# Patient Record
Sex: FEMALE | Race: BLACK OR AFRICAN AMERICAN | Employment: UNEMPLOYED | URBAN - METROPOLITAN AREA
[De-identification: names, ages, dates, MRNs, and addresses within clinical notes are randomized per-mention and may not be internally consistent; named-entity substitution may affect disease eponyms.]

---

## 2017-02-08 ENCOUNTER — HOSPITAL ENCOUNTER (OUTPATIENT)
Age: 27
Setting detail: SPECIMEN
Discharge: HOME | End: 2017-02-08
Payer: MEDICARE

## 2017-02-08 ENCOUNTER — OFFICE VISIT (OUTPATIENT)
Dept: INTERNAL MEDICINE | Facility: CLINIC | Age: 27
End: 2017-02-08

## 2017-02-08 VITALS
HEIGHT: 64 IN | DIASTOLIC BLOOD PRESSURE: 72 MMHG | WEIGHT: 100 LBS | HEART RATE: 104 BPM | SYSTOLIC BLOOD PRESSURE: 117 MMHG | BODY MASS INDEX: 17.07 KG/M2 | OXYGEN SATURATION: 96 %

## 2017-02-08 DIAGNOSIS — J45.20 MILD INTERMITTENT ASTHMA WITHOUT COMPLICATION: ICD-10-CM

## 2017-02-08 DIAGNOSIS — F17.200 SMOKING: ICD-10-CM

## 2017-02-08 DIAGNOSIS — Z32.01 POSITIVE URINE PREGNANCY TEST: ICD-10-CM

## 2017-02-08 DIAGNOSIS — Z11.3 SCREEN FOR STD (SEXUALLY TRANSMITTED DISEASE): ICD-10-CM

## 2017-02-08 DIAGNOSIS — R63.4 WEIGHT LOSS: ICD-10-CM

## 2017-02-08 DIAGNOSIS — J45.41 MODERATE PERSISTENT ASTHMA WITH ACUTE EXACERBATION: Primary | ICD-10-CM

## 2017-02-08 LAB
ABSOLUTE EOS #: 0.2 K/UL (ref 0–0.4)
ABSOLUTE LYMPH #: 2.8 K/UL (ref 1–4.8)
ABSOLUTE MONO #: 0.7 K/UL (ref 0.1–1.2)
ALBUMIN SERPL-MCNC: 4.4 G/DL (ref 3.5–5.2)
ALBUMIN/GLOBULIN RATIO: 1.3 (ref 1–2.5)
ALP BLD-CCNC: 47 U/L (ref 35–104)
ALT SERPL-CCNC: 16 U/L (ref 5–33)
ANION GAP SERPL CALCULATED.3IONS-SCNC: 16 MMOL/L (ref 9–17)
AST SERPL-CCNC: 19 U/L
BASOPHILS # BLD: 1 % (ref 0–2)
BASOPHILS ABSOLUTE: 0 K/UL (ref 0–0.2)
BILIRUB SERPL-MCNC: 0.34 MG/DL (ref 0.3–1.2)
BUN BLDV-MCNC: 11 MG/DL (ref 6–20)
BUN/CREAT BLD: ABNORMAL (ref 9–20)
CALCIUM SERPL-MCNC: 9.1 MG/DL (ref 8.6–10.4)
CHLORIDE BLD-SCNC: 100 MMOL/L (ref 98–107)
CO2: 19 MMOL/L (ref 20–31)
CONTROL: ABNORMAL
CREAT SERPL-MCNC: 0.6 MG/DL (ref 0.5–0.9)
DIFFERENTIAL TYPE: NORMAL
EOSINOPHILS RELATIVE PERCENT: 2 % (ref 1–4)
GFR AFRICAN AMERICAN: >60 ML/MIN
GFR NON-AFRICAN AMERICAN: >60 ML/MIN
GFR SERPL CREATININE-BSD FRML MDRD: ABNORMAL ML/MIN/{1.73_M2}
GFR SERPL CREATININE-BSD FRML MDRD: ABNORMAL ML/MIN/{1.73_M2}
GLUCOSE BLD-MCNC: 82 MG/DL (ref 70–99)
HCG QUALITATIVE: POSITIVE
HCT VFR BLD CALC: 40.9 % (ref 36–46)
HEMOGLOBIN: 14.1 G/DL (ref 12–16)
HEPATITIS B SURFACE ANTIGEN: NONREACTIVE
HEPATITIS C ANTIBODY: NONREACTIVE
HIV AG/AB: NONREACTIVE
LYMPHOCYTES # BLD: 35 % (ref 24–44)
MCH RBC QN AUTO: 29.6 PG (ref 26–34)
MCHC RBC AUTO-ENTMCNC: 34.5 G/DL (ref 31–37)
MCV RBC AUTO: 85.7 FL (ref 80–100)
MONOCYTES # BLD: 9 % (ref 2–11)
PDW BLD-RTO: 13.5 % (ref 12.5–15.4)
PLATELET # BLD: 210 K/UL (ref 140–450)
PLATELET ESTIMATE: NORMAL
PMV BLD AUTO: 9.2 FL (ref 6–12)
POTASSIUM SERPL-SCNC: 3.5 MMOL/L (ref 3.7–5.3)
PREGNANCY TEST URINE, POC: POSITIVE
RBC # BLD: 4.77 M/UL (ref 4–5.2)
RBC # BLD: NORMAL 10*6/UL
SEG NEUTROPHILS: 53 % (ref 36–66)
SEGMENTED NEUTROPHILS ABSOLUTE COUNT: 4.3 K/UL (ref 1.8–7.7)
SODIUM BLD-SCNC: 135 MMOL/L (ref 135–144)
T. PALLIDUM, IGG: NONREACTIVE
TOTAL PROTEIN: 7.7 G/DL (ref 6.4–8.3)
TSH SERPL DL<=0.05 MIU/L-ACNC: 1.18 MIU/L (ref 0.3–5)
WBC # BLD: 8 K/UL (ref 3.5–11)
WBC # BLD: NORMAL 10*3/UL

## 2017-02-08 PROCEDURE — 80053 COMPREHEN METABOLIC PANEL: CPT

## 2017-02-08 PROCEDURE — 84443 ASSAY THYROID STIM HORMONE: CPT

## 2017-02-08 PROCEDURE — 86780 TREPONEMA PALLIDUM: CPT

## 2017-02-08 PROCEDURE — 81025 URINE PREGNANCY TEST: CPT | Performed by: INTERNAL MEDICINE

## 2017-02-08 PROCEDURE — 99213 OFFICE O/P EST LOW 20 MIN: CPT | Performed by: INTERNAL MEDICINE

## 2017-02-08 PROCEDURE — 86803 HEPATITIS C AB TEST: CPT

## 2017-02-08 PROCEDURE — 84703 CHORIONIC GONADOTROPIN ASSAY: CPT

## 2017-02-08 PROCEDURE — 85025 COMPLETE CBC W/AUTO DIFF WBC: CPT

## 2017-02-08 PROCEDURE — 87340 HEPATITIS B SURFACE AG IA: CPT

## 2017-02-08 PROCEDURE — 36415 COLL VENOUS BLD VENIPUNCTURE: CPT

## 2017-02-08 PROCEDURE — 87389 HIV-1 AG W/HIV-1&-2 AB AG IA: CPT

## 2017-02-08 RX ORDER — NICOTINE 21 MG/24HR
1 PATCH, TRANSDERMAL 24 HOURS TRANSDERMAL EVERY 24 HOURS
Qty: 30 PATCH | Refills: 3 | Status: SHIPPED | OUTPATIENT
Start: 2017-02-08 | End: 2017-08-24

## 2017-02-08 RX ORDER — BUDESONIDE 0.25 MG/2ML
250 INHALANT ORAL 2 TIMES DAILY
Qty: 60 AMPULE | Refills: 3 | Status: ON HOLD | OUTPATIENT
Start: 2017-02-08 | End: 2017-08-21 | Stop reason: HOSPADM

## 2017-02-08 RX ORDER — PNV NO.95/FERROUS FUM/FOLIC AC 28MG-0.8MG
1 TABLET ORAL DAILY
Qty: 90 TABLET | Refills: 1 | Status: SHIPPED | OUTPATIENT
Start: 2017-02-08 | End: 2018-08-31 | Stop reason: SDUPTHER

## 2017-02-08 RX ORDER — ALBUTEROL SULFATE 2.5 MG/3ML
2.5 SOLUTION RESPIRATORY (INHALATION) EVERY 6 HOURS PRN
Qty: 120 EACH | Refills: 3 | Status: SHIPPED | OUTPATIENT
Start: 2017-02-08 | End: 2018-03-01 | Stop reason: SDUPTHER

## 2017-02-10 DIAGNOSIS — Z34.90 PREGNANCY, UNSPECIFIED GESTATIONAL AGE: Primary | ICD-10-CM

## 2017-02-20 ENCOUNTER — TELEPHONE (OUTPATIENT)
Dept: INTERNAL MEDICINE | Facility: CLINIC | Age: 27
End: 2017-02-20

## 2017-02-20 DIAGNOSIS — Z34.90 EARLY STAGE OF PREGNANCY: Primary | ICD-10-CM

## 2017-02-27 ENCOUNTER — TELEPHONE (OUTPATIENT)
Dept: INTERNAL MEDICINE | Facility: CLINIC | Age: 27
End: 2017-02-27

## 2017-03-09 ENCOUNTER — TELEPHONE (OUTPATIENT)
Dept: INTERNAL MEDICINE | Facility: CLINIC | Age: 27
End: 2017-03-09

## 2017-03-13 ENCOUNTER — TELEPHONE (OUTPATIENT)
Dept: OBGYN | Age: 27
End: 2017-03-13

## 2017-03-28 ENCOUNTER — INITIAL PRENATAL (OUTPATIENT)
Dept: OBGYN | Age: 27
End: 2017-03-28
Payer: MEDICARE

## 2017-03-28 VITALS
WEIGHT: 105 LBS | SYSTOLIC BLOOD PRESSURE: 110 MMHG | DIASTOLIC BLOOD PRESSURE: 60 MMHG | BODY MASS INDEX: 18.02 KG/M2 | HEART RATE: 70 BPM

## 2017-03-28 DIAGNOSIS — Z72.0 TOBACCO USE: ICD-10-CM

## 2017-03-28 DIAGNOSIS — O09.91 HIGH-RISK PREGNANCY IN FIRST TRIMESTER: Primary | ICD-10-CM

## 2017-03-28 DIAGNOSIS — F12.90 MARIJUANA SMOKER: ICD-10-CM

## 2017-03-28 DIAGNOSIS — J45.41 MODERATE PERSISTENT ASTHMA WITH ACUTE EXACERBATION: ICD-10-CM

## 2017-03-28 PROBLEM — Z32.01 POSITIVE URINE PREGNANCY TEST: Status: RESOLVED | Noted: 2017-02-08 | Resolved: 2017-03-28

## 2017-03-28 PROBLEM — J45.909 ASTHMA: Status: ACTIVE | Noted: 2017-03-28

## 2017-03-28 PROCEDURE — 99211 OFF/OP EST MAY X REQ PHY/QHP: CPT | Performed by: OBSTETRICS & GYNECOLOGY

## 2017-03-28 PROCEDURE — H1000 PRENATAL CARE ATRISK ASSESSM: HCPCS | Performed by: OBSTETRICS & GYNECOLOGY

## 2017-03-28 RX ORDER — ALBUTEROL SULFATE 90 UG/1
2 AEROSOL, METERED RESPIRATORY (INHALATION) EVERY 6 HOURS PRN
COMMUNITY
End: 2017-05-02 | Stop reason: SDUPTHER

## 2017-03-31 DIAGNOSIS — O09.91 HIGH-RISK PREGNANCY IN FIRST TRIMESTER: Primary | ICD-10-CM

## 2017-04-06 ENCOUNTER — TELEPHONE (OUTPATIENT)
Dept: OBGYN | Age: 27
End: 2017-04-06

## 2017-04-06 ENCOUNTER — INITIAL PRENATAL (OUTPATIENT)
Dept: OBGYN | Age: 27
End: 2017-04-06
Payer: MEDICARE

## 2017-04-06 VITALS
HEART RATE: 68 BPM | DIASTOLIC BLOOD PRESSURE: 66 MMHG | BODY MASS INDEX: 18.02 KG/M2 | WEIGHT: 105 LBS | SYSTOLIC BLOOD PRESSURE: 107 MMHG

## 2017-04-06 DIAGNOSIS — Z3A.15 15 WEEKS GESTATION OF PREGNANCY: ICD-10-CM

## 2017-04-06 DIAGNOSIS — O20.9 VAGINAL BLEEDING IN PREGNANCY, FIRST TRIMESTER: ICD-10-CM

## 2017-04-06 DIAGNOSIS — O09.91 HRP (HIGH RISK PREGNANCY), FIRST TRIMESTER: Primary | ICD-10-CM

## 2017-04-06 DIAGNOSIS — Z78.9 DATE OF LAST MENSTRUAL PERIOD (LMP) UNKNOWN: ICD-10-CM

## 2017-04-06 PROBLEM — O46.90 VAGINAL BLEEDING IN PREGNANCY: Status: ACTIVE | Noted: 2017-04-06

## 2017-04-06 PROCEDURE — 99213 OFFICE O/P EST LOW 20 MIN: CPT | Performed by: OBSTETRICS & GYNECOLOGY

## 2017-04-07 ENCOUNTER — APPOINTMENT (OUTPATIENT)
Dept: ULTRASOUND IMAGING | Age: 27
End: 2017-04-07
Payer: MEDICARE

## 2017-04-07 ENCOUNTER — HOSPITAL ENCOUNTER (EMERGENCY)
Age: 27
Discharge: HOME | End: 2017-04-07
Attending: EMERGENCY MEDICINE
Payer: MEDICARE

## 2017-04-07 VITALS
DIASTOLIC BLOOD PRESSURE: 71 MMHG | SYSTOLIC BLOOD PRESSURE: 103 MMHG | WEIGHT: 105 LBS | HEART RATE: 78 BPM | OXYGEN SATURATION: 100 % | RESPIRATION RATE: 15 BRPM | TEMPERATURE: 99.1 F | HEIGHT: 64 IN | BODY MASS INDEX: 17.93 KG/M2

## 2017-04-07 DIAGNOSIS — O20.0 THREATENED ABORTION IN FIRST TRIMESTER: Primary | ICD-10-CM

## 2017-04-07 LAB
-: NORMAL
ABO/RH: NORMAL
ABSOLUTE EOS #: 0.3 K/UL (ref 0–0.4)
ABSOLUTE LYMPH #: 2.6 K/UL (ref 1–4.8)
ABSOLUTE MONO #: 0.6 K/UL (ref 0.1–1.2)
AMORPHOUS: NORMAL
BACTERIA: NORMAL
BASOPHILS # BLD: 1 % (ref 0–2)
BASOPHILS ABSOLUTE: 0.1 K/UL (ref 0–0.2)
BILIRUBIN URINE: NEGATIVE
CASTS UA: NORMAL /LPF (ref 0–8)
COLOR: YELLOW
COMMENT UA: ABNORMAL
CRYSTALS, UA: NORMAL /HPF
DIFFERENTIAL TYPE: ABNORMAL
DIRECT EXAM: NORMAL
EOSINOPHILS RELATIVE PERCENT: 4 % (ref 1–4)
EPITHELIAL CELLS UA: NORMAL /HPF (ref 0–5)
GLUCOSE URINE: NEGATIVE
HCG QUANTITATIVE: ABNORMAL IU/L
HCT VFR BLD CALC: 34.3 % (ref 36–46)
HEMOGLOBIN: 11.9 G/DL (ref 12–16)
KETONES, URINE: NEGATIVE
LEUKOCYTE ESTERASE, URINE: NEGATIVE
LYMPHOCYTES # BLD: 35 % (ref 24–44)
Lab: NORMAL
MCH RBC QN AUTO: 30.3 PG (ref 26–34)
MCHC RBC AUTO-ENTMCNC: 34.6 G/DL (ref 31–37)
MCV RBC AUTO: 87.6 FL (ref 80–100)
MONOCYTES # BLD: 7 % (ref 2–11)
MUCUS: NORMAL
NITRITE, URINE: NEGATIVE
OTHER OBSERVATIONS UA: NORMAL
PDW BLD-RTO: 14 % (ref 12.5–15.4)
PH UA: 6.5 (ref 5–8)
PLATELET # BLD: 171 K/UL (ref 140–450)
PLATELET ESTIMATE: ABNORMAL
PMV BLD AUTO: 8 FL (ref 6–12)
PROTEIN UA: NEGATIVE
RBC # BLD: 3.91 M/UL (ref 4–5.2)
RBC # BLD: ABNORMAL 10*6/UL
RBC UA: NORMAL /HPF (ref 0–4)
RENAL EPITHELIAL, UA: NORMAL /HPF
SEG NEUTROPHILS: 53 % (ref 36–66)
SEGMENTED NEUTROPHILS ABSOLUTE COUNT: 4.1 K/UL (ref 1.8–7.7)
SPECIFIC GRAVITY UA: 1.02 (ref 1–1.03)
SPECIMEN DESCRIPTION: NORMAL
STATUS: NORMAL
TRICHOMONAS: NORMAL
TURBIDITY: ABNORMAL
URINE HGB: ABNORMAL
UROBILINOGEN, URINE: NORMAL
WBC # BLD: 7.6 K/UL (ref 3.5–11)
WBC # BLD: ABNORMAL 10*3/UL
WBC UA: NORMAL /HPF (ref 0–5)
YEAST: NORMAL

## 2017-04-07 PROCEDURE — 87480 CANDIDA DNA DIR PROBE: CPT

## 2017-04-07 PROCEDURE — 86901 BLOOD TYPING SEROLOGIC RH(D): CPT

## 2017-04-07 PROCEDURE — 81001 URINALYSIS AUTO W/SCOPE: CPT

## 2017-04-07 PROCEDURE — 87660 TRICHOMONAS VAGIN DIR PROBE: CPT

## 2017-04-07 PROCEDURE — 76817 TRANSVAGINAL US OBSTETRIC: CPT

## 2017-04-07 PROCEDURE — 84702 CHORIONIC GONADOTROPIN TEST: CPT

## 2017-04-07 PROCEDURE — 99284 EMERGENCY DEPT VISIT MOD MDM: CPT

## 2017-04-07 PROCEDURE — 86900 BLOOD TYPING SEROLOGIC ABO: CPT

## 2017-04-07 PROCEDURE — 87591 N.GONORRHOEAE DNA AMP PROB: CPT

## 2017-04-07 PROCEDURE — 87491 CHLMYD TRACH DNA AMP PROBE: CPT

## 2017-04-07 PROCEDURE — 85025 COMPLETE CBC W/AUTO DIFF WBC: CPT

## 2017-04-07 PROCEDURE — 87510 GARDNER VAG DNA DIR PROBE: CPT

## 2017-04-07 ASSESSMENT — ENCOUNTER SYMPTOMS
RHINORRHEA: 0
BACK PAIN: 0
ABDOMINAL DISTENTION: 0
WHEEZING: 0
SHORTNESS OF BREATH: 0
NAUSEA: 0
PHOTOPHOBIA: 0
VOMITING: 0
ABDOMINAL PAIN: 0
BLOOD IN STOOL: 0
COUGH: 0
SORE THROAT: 0

## 2017-04-10 LAB
C TRACH DNA GENITAL QL NAA+PROBE: NEGATIVE
N. GONORRHOEAE DNA: NEGATIVE

## 2017-04-11 ENCOUNTER — TELEPHONE (OUTPATIENT)
Dept: OBGYN | Age: 27
End: 2017-04-11

## 2017-05-02 RX ORDER — ALBUTEROL SULFATE 90 UG/1
2 AEROSOL, METERED RESPIRATORY (INHALATION) EVERY 6 HOURS PRN
Qty: 1 INHALER | Refills: 0 | Status: SHIPPED | OUTPATIENT
Start: 2017-05-02 | End: 2017-06-17 | Stop reason: SDUPTHER

## 2017-05-11 ENCOUNTER — HOSPITAL ENCOUNTER (EMERGENCY)
Age: 27
Discharge: HOME | End: 2017-05-11
Attending: EMERGENCY MEDICINE
Payer: MEDICARE

## 2017-05-11 ENCOUNTER — INITIAL PRENATAL (OUTPATIENT)
Dept: OBGYN | Age: 27
End: 2017-05-11
Payer: MEDICARE

## 2017-05-11 VITALS
SYSTOLIC BLOOD PRESSURE: 102 MMHG | HEART RATE: 67 BPM | TEMPERATURE: 97 F | RESPIRATION RATE: 14 BRPM | OXYGEN SATURATION: 100 % | WEIGHT: 110 LBS | DIASTOLIC BLOOD PRESSURE: 71 MMHG | HEIGHT: 64 IN | BODY MASS INDEX: 18.78 KG/M2

## 2017-05-11 VITALS
DIASTOLIC BLOOD PRESSURE: 70 MMHG | SYSTOLIC BLOOD PRESSURE: 109 MMHG | WEIGHT: 107 LBS | BODY MASS INDEX: 18.37 KG/M2 | HEART RATE: 83 BPM

## 2017-05-11 DIAGNOSIS — R06.89 DYSPNEA AND RESPIRATORY ABNORMALITIES: ICD-10-CM

## 2017-05-11 DIAGNOSIS — Z3A.17 17 WEEKS GESTATION OF PREGNANCY: ICD-10-CM

## 2017-05-11 DIAGNOSIS — R06.00 DYSPNEA AND RESPIRATORY ABNORMALITIES: ICD-10-CM

## 2017-05-11 DIAGNOSIS — O09.92 HRP (HIGH RISK PREGNANCY), SECOND TRIMESTER: Primary | ICD-10-CM

## 2017-05-11 DIAGNOSIS — Z76.0 ENCOUNTER FOR MEDICATION REFILL: Primary | ICD-10-CM

## 2017-05-11 PROCEDURE — 99214 OFFICE O/P EST MOD 30 MIN: CPT | Performed by: OBSTETRICS & GYNECOLOGY

## 2017-05-11 PROCEDURE — 99284 EMERGENCY DEPT VISIT MOD MDM: CPT

## 2017-05-11 PROCEDURE — 6370000000 HC RX 637 (ALT 250 FOR IP): Performed by: EMERGENCY MEDICINE

## 2017-05-11 PROCEDURE — 94640 AIRWAY INHALATION TREATMENT: CPT

## 2017-05-11 RX ORDER — ALBUTEROL SULFATE 90 UG/1
2 AEROSOL, METERED RESPIRATORY (INHALATION) EVERY 6 HOURS PRN
Status: DISCONTINUED | OUTPATIENT
Start: 2017-05-11 | End: 2017-05-11 | Stop reason: HOSPADM

## 2017-05-11 RX ADMIN — ALBUTEROL SULFATE 2 PUFF: 90 AEROSOL, METERED RESPIRATORY (INHALATION) at 08:02

## 2017-05-11 ASSESSMENT — ENCOUNTER SYMPTOMS
SHORTNESS OF BREATH: 1
GASTROINTESTINAL NEGATIVE: 1
BACK PAIN: 0
CONSTIPATION: 0
ALLERGIC/IMMUNOLOGIC NEGATIVE: 1
CHEST TIGHTNESS: 0
EYES NEGATIVE: 1
TROUBLE SWALLOWING: 0
ABDOMINAL PAIN: 0
NAUSEA: 0
DIARRHEA: 0
WHEEZING: 0
SORE THROAT: 0
VOMITING: 0
COUGH: 0

## 2017-05-16 DIAGNOSIS — Z72.0 TOBACCO USE: ICD-10-CM

## 2017-05-16 RX ORDER — DM/PSEUDOEPHED/ACETAMINOPHEN 30-60-650
SOLUTION, ORAL ORAL
Qty: 110 EACH | Refills: 2 | Status: SHIPPED | OUTPATIENT
Start: 2017-05-16 | End: 2017-08-24

## 2017-06-01 ENCOUNTER — ROUTINE PRENATAL (OUTPATIENT)
Dept: PERINATAL CARE | Age: 27
End: 2017-06-01
Payer: MEDICARE

## 2017-06-01 VITALS
TEMPERATURE: 98.5 F | BODY MASS INDEX: 18.61 KG/M2 | SYSTOLIC BLOOD PRESSURE: 95 MMHG | HEIGHT: 64 IN | RESPIRATION RATE: 16 BRPM | DIASTOLIC BLOOD PRESSURE: 63 MMHG | HEART RATE: 103 BPM | WEIGHT: 109 LBS

## 2017-06-01 DIAGNOSIS — Z3A.20 20 WEEKS GESTATION OF PREGNANCY: ICD-10-CM

## 2017-06-01 DIAGNOSIS — O35.BXX0 FETAL CARDIAC ECHOGENIC FOCUS, ANTEPARTUM, NOT APPLICABLE OR UNSPECIFIED FETUS: Primary | ICD-10-CM

## 2017-06-01 DIAGNOSIS — O99.332 TOBACCO USE DISORDER COMPLICATING PREGNANCY, CHILDBIRTH, OR PUERPERIUM, ANTEPARTUM, SECOND TRIMESTER: ICD-10-CM

## 2017-06-01 DIAGNOSIS — O35.BXX0 FETAL CARDIAC ECHOGENIC FOCUS, ANTEPARTUM, NOT APPLICABLE OR UNSPECIFIED FETUS: ICD-10-CM

## 2017-06-01 DIAGNOSIS — J45.909 ASTHMA AFFECTING PREGNANCY, ANTEPARTUM: ICD-10-CM

## 2017-06-01 DIAGNOSIS — O99.519 ASTHMA AFFECTING PREGNANCY, ANTEPARTUM: ICD-10-CM

## 2017-06-01 DIAGNOSIS — F17.200 TOBACCO USE DISORDER COMPLICATING PREGNANCY, CHILDBIRTH, OR PUERPERIUM, ANTEPARTUM, SECOND TRIMESTER: ICD-10-CM

## 2017-06-01 DIAGNOSIS — O26.12 LOW WEIGHT GAIN IN PREGNANCY, SECOND TRIMESTER: ICD-10-CM

## 2017-06-01 DIAGNOSIS — Z36.86 ENCOUNTER FOR SCREENING FOR RISK OF PRE-TERM LABOR: ICD-10-CM

## 2017-06-01 PROBLEM — O26.10 LOW WEIGHT GAIN IN PREGNANCY: Status: ACTIVE | Noted: 2017-06-01

## 2017-06-01 PROBLEM — O09.899 SINGLE UMBILICAL ARTERY, MATERNAL, ANTEPARTUM: Status: ACTIVE | Noted: 2017-06-01

## 2017-06-01 PROBLEM — O99.330 TOBACCO USE DISORDER COMPLICATING PREGNANCY, CHILDBIRTH, OR PUERPERIUM, ANTEPARTUM: Status: ACTIVE | Noted: 2017-06-01

## 2017-06-01 PROCEDURE — 99242 OFF/OP CONSLTJ NEW/EST SF 20: CPT | Performed by: OBSTETRICS & GYNECOLOGY

## 2017-06-01 PROCEDURE — 76811 OB US DETAILED SNGL FETUS: CPT | Performed by: OBSTETRICS & GYNECOLOGY

## 2017-06-01 PROCEDURE — 76817 TRANSVAGINAL US OBSTETRIC: CPT | Performed by: OBSTETRICS & GYNECOLOGY

## 2017-06-02 ENCOUNTER — TELEPHONE (OUTPATIENT)
Dept: OBGYN | Age: 27
End: 2017-06-02

## 2017-06-02 ENCOUNTER — HOSPITAL ENCOUNTER (OUTPATIENT)
Age: 27
Discharge: HOME | End: 2017-06-02
Payer: MEDICARE

## 2017-06-02 LAB
SEND OUT REPORT: NORMAL
TEST NAME: NORMAL

## 2017-06-02 PROCEDURE — 82105 ALPHA-FETOPROTEIN SERUM: CPT

## 2017-06-02 PROCEDURE — 36415 COLL VENOUS BLD VENIPUNCTURE: CPT

## 2017-06-05 LAB
AFP INTERPRETATION: NORMAL
AFP MOM: 0.96
AFP SPECIMEN: NORMAL
AFP: 79 NG/ML
DATE OF BIRTH: NORMAL
DATING METHOD: NORMAL
DETERMINED BY: NORMAL
DIABETIC: NO
DUE DATE: NORMAL
ESTIMATED DUE DATE: NORMAL
FAMILY HISTORY NTD: NO
GESTATIONAL AGE: 20.43 WEEKS
HISTORY OF ANEUPLOIDY?: NO
INSULIN REQ DIABETES: NO
LAST MENSTRUAL PERIOD: NORMAL
MATERNAL AGE AT EDD: 27 YR
MATERNAL WEIGHT: 109
NUMBER OF FETUSES: NORMAL
PATIENT WEIGHT: 109 LBS
PHYSICIAN: NORMAL
RACE (MATERNAL): NORMAL
RACE: NORMAL
ZZ NTE CLEAN UP: HISTORY: NO

## 2017-06-08 ENCOUNTER — ROUTINE PRENATAL (OUTPATIENT)
Dept: OBGYN | Age: 27
End: 2017-06-08
Payer: MEDICARE

## 2017-06-08 VITALS
WEIGHT: 108 LBS | DIASTOLIC BLOOD PRESSURE: 67 MMHG | HEART RATE: 75 BPM | BODY MASS INDEX: 18.54 KG/M2 | SYSTOLIC BLOOD PRESSURE: 103 MMHG

## 2017-06-08 DIAGNOSIS — F12.90 MARIJUANA SMOKER: ICD-10-CM

## 2017-06-08 DIAGNOSIS — Z72.0 TOBACCO USE: ICD-10-CM

## 2017-06-08 DIAGNOSIS — O09.92 HRP (HIGH RISK PREGNANCY), SECOND TRIMESTER: ICD-10-CM

## 2017-06-08 DIAGNOSIS — Z3A.21 21 WEEKS GESTATION OF PREGNANCY: Primary | ICD-10-CM

## 2017-06-08 PROCEDURE — 99213 OFFICE O/P EST LOW 20 MIN: CPT | Performed by: OBSTETRICS & GYNECOLOGY

## 2017-06-14 ENCOUNTER — TELEPHONE (OUTPATIENT)
Dept: OBGYN | Age: 27
End: 2017-06-14

## 2017-06-17 RX ORDER — ALBUTEROL SULFATE 90 UG/1
2 AEROSOL, METERED RESPIRATORY (INHALATION) EVERY 6 HOURS PRN
Qty: 1 INHALER | Refills: 0 | Status: SHIPPED | OUTPATIENT
Start: 2017-06-17 | End: 2017-07-31 | Stop reason: SDUPTHER

## 2017-06-22 ENCOUNTER — ROUTINE PRENATAL (OUTPATIENT)
Dept: PERINATAL CARE | Age: 27
End: 2017-06-22
Payer: MEDICARE

## 2017-06-22 VITALS
HEART RATE: 88 BPM | BODY MASS INDEX: 18.95 KG/M2 | TEMPERATURE: 97.9 F | WEIGHT: 111 LBS | SYSTOLIC BLOOD PRESSURE: 113 MMHG | DIASTOLIC BLOOD PRESSURE: 70 MMHG | HEIGHT: 64 IN | RESPIRATION RATE: 16 BRPM

## 2017-06-22 DIAGNOSIS — O35.BXX0 FETAL CARDIAC ANOMALY COMPLICATING PREGNANCY, ANTEPARTUM, NOT APPLICABLE OR UNSPECIFIED FETUS: Primary | ICD-10-CM

## 2017-06-22 DIAGNOSIS — J45.909 ASTHMA AFFECTING PREGNANCY, ANTEPARTUM: ICD-10-CM

## 2017-06-22 DIAGNOSIS — O99.519 ASTHMA AFFECTING PREGNANCY, ANTEPARTUM: ICD-10-CM

## 2017-06-22 DIAGNOSIS — Z13.89 ENCOUNTER FOR ROUTINE SCREENING FOR MALFORMATION USING ULTRASONICS: ICD-10-CM

## 2017-06-22 DIAGNOSIS — O35.BXX0 FETAL CARDIAC ECHOGENIC FOCUS, ANTEPARTUM, NOT APPLICABLE OR UNSPECIFIED FETUS: ICD-10-CM

## 2017-06-22 DIAGNOSIS — Z36.4 ANTENATAL SCREENING FOR FETAL GROWTH RETARDATION USING ULTRASONICS: ICD-10-CM

## 2017-06-22 DIAGNOSIS — O99.332 TOBACCO USE DISORDER COMPLICATING PREGNANCY, CHILDBIRTH, OR PUERPERIUM, ANTEPARTUM, SECOND TRIMESTER: ICD-10-CM

## 2017-06-22 DIAGNOSIS — F17.200 TOBACCO USE DISORDER COMPLICATING PREGNANCY, CHILDBIRTH, OR PUERPERIUM, ANTEPARTUM, SECOND TRIMESTER: ICD-10-CM

## 2017-06-22 DIAGNOSIS — O26.12 LOW WEIGHT GAIN IN PREGNANCY, SECOND TRIMESTER: ICD-10-CM

## 2017-06-22 DIAGNOSIS — Z3A.23 23 WEEKS GESTATION OF PREGNANCY: ICD-10-CM

## 2017-06-22 PROCEDURE — 76827 ECHO EXAM OF FETAL HEART: CPT | Performed by: OBSTETRICS & GYNECOLOGY

## 2017-06-22 PROCEDURE — 93325 DOPPLER ECHO COLOR FLOW MAPG: CPT | Performed by: OBSTETRICS & GYNECOLOGY

## 2017-06-22 PROCEDURE — 76816 OB US FOLLOW-UP PER FETUS: CPT | Performed by: OBSTETRICS & GYNECOLOGY

## 2017-06-22 PROCEDURE — 76825 ECHO EXAM OF FETAL HEART: CPT | Performed by: OBSTETRICS & GYNECOLOGY

## 2017-06-22 PROCEDURE — 99242 OFF/OP CONSLTJ NEW/EST SF 20: CPT | Performed by: OBSTETRICS & GYNECOLOGY

## 2017-06-30 ENCOUNTER — HOSPITAL ENCOUNTER (OUTPATIENT)
Dept: NON INVASIVE DIAGNOSTICS | Age: 27
Discharge: HOME | End: 2017-06-30
Payer: MEDICARE

## 2017-06-30 ENCOUNTER — OFFICE VISIT (OUTPATIENT)
Dept: PEDIATRIC CARDIOLOGY | Age: 27
End: 2017-06-30
Payer: MEDICARE

## 2017-06-30 DIAGNOSIS — O35.BXX0 FETAL CARDIAC ECHOGENIC FOCUS, ANTEPARTUM, NOT APPLICABLE OR UNSPECIFIED FETUS: Primary | ICD-10-CM

## 2017-06-30 PROCEDURE — 76825 ECHO EXAM OF FETAL HEART: CPT | Performed by: PEDIATRICS

## 2017-06-30 PROCEDURE — 99245 OFF/OP CONSLTJ NEW/EST HI 55: CPT | Performed by: PEDIATRICS

## 2017-06-30 PROCEDURE — 93325 DOPPLER ECHO COLOR FLOW MAPG: CPT | Performed by: PEDIATRICS

## 2017-06-30 PROCEDURE — 76827 ECHO EXAM OF FETAL HEART: CPT | Performed by: PEDIATRICS

## 2017-07-13 ENCOUNTER — HOSPITAL ENCOUNTER (EMERGENCY)
Age: 27
Discharge: LEFT BEFORE DISCHARGE | End: 2017-07-13
Attending: EMERGENCY MEDICINE
Payer: MEDICARE

## 2017-07-13 VITALS
HEART RATE: 97 BPM | TEMPERATURE: 98.3 F | SYSTOLIC BLOOD PRESSURE: 106 MMHG | HEIGHT: 64 IN | DIASTOLIC BLOOD PRESSURE: 71 MMHG | RESPIRATION RATE: 12 BRPM | WEIGHT: 110 LBS | BODY MASS INDEX: 18.78 KG/M2 | OXYGEN SATURATION: 98 %

## 2017-07-13 DIAGNOSIS — O23.43 UTI IN PREGNANCY, THIRD TRIMESTER: Primary | ICD-10-CM

## 2017-07-13 LAB
-: ABNORMAL
AMORPHOUS: ABNORMAL
BACTERIA: ABNORMAL
BILIRUBIN URINE: NEGATIVE
CASTS UA: ABNORMAL /LPF (ref 0–8)
COLOR: YELLOW
CRYSTALS, UA: ABNORMAL /HPF
DIRECT EXAM: NORMAL
EPITHELIAL CELLS UA: ABNORMAL /HPF (ref 0–5)
GLUCOSE URINE: NEGATIVE
KETONES, URINE: NEGATIVE
LEUKOCYTE ESTERASE, URINE: ABNORMAL
Lab: NORMAL
MUCUS: ABNORMAL
NITRITE, URINE: NEGATIVE
OTHER OBSERVATIONS UA: ABNORMAL
PH UA: 7 (ref 5–8)
PROTEIN UA: NEGATIVE
RBC UA: ABNORMAL /HPF (ref 0–4)
RENAL EPITHELIAL, UA: ABNORMAL /HPF
SPECIFIC GRAVITY UA: 1.01 (ref 1–1.03)
SPECIMEN DESCRIPTION: NORMAL
STATUS: NORMAL
TRICHOMONAS: ABNORMAL
TURBIDITY: ABNORMAL
URINE HGB: NEGATIVE
UROBILINOGEN, URINE: NORMAL
WBC UA: ABNORMAL /HPF (ref 0–5)
YEAST: ABNORMAL

## 2017-07-13 PROCEDURE — 87591 N.GONORRHOEAE DNA AMP PROB: CPT

## 2017-07-13 PROCEDURE — 6370000000 HC RX 637 (ALT 250 FOR IP): Performed by: PHYSICIAN ASSISTANT

## 2017-07-13 PROCEDURE — 81001 URINALYSIS AUTO W/SCOPE: CPT

## 2017-07-13 PROCEDURE — 87480 CANDIDA DNA DIR PROBE: CPT

## 2017-07-13 PROCEDURE — 99284 EMERGENCY DEPT VISIT MOD MDM: CPT

## 2017-07-13 PROCEDURE — 87660 TRICHOMONAS VAGIN DIR PROBE: CPT

## 2017-07-13 PROCEDURE — 87510 GARDNER VAG DNA DIR PROBE: CPT

## 2017-07-13 PROCEDURE — 87491 CHLMYD TRACH DNA AMP PROBE: CPT

## 2017-07-13 PROCEDURE — 87086 URINE CULTURE/COLONY COUNT: CPT

## 2017-07-13 RX ORDER — FLUCONAZOLE 150 MG/1
150 TABLET ORAL ONCE
Qty: 1 TABLET | Refills: 0 | Status: SHIPPED | OUTPATIENT
Start: 2017-07-13 | End: 2017-07-13

## 2017-07-13 RX ORDER — CEPHALEXIN 500 MG/1
500 CAPSULE ORAL 2 TIMES DAILY
Qty: 14 CAPSULE | Refills: 0 | Status: SHIPPED | OUTPATIENT
Start: 2017-07-13 | End: 2017-07-13

## 2017-07-13 RX ORDER — CEPHALEXIN 500 MG/1
500 CAPSULE ORAL 2 TIMES DAILY
Qty: 14 CAPSULE | Refills: 0 | Status: SHIPPED | OUTPATIENT
Start: 2017-07-13 | End: 2017-07-20

## 2017-07-13 RX ORDER — CEPHALEXIN 250 MG/1
500 CAPSULE ORAL ONCE
Status: COMPLETED | OUTPATIENT
Start: 2017-07-13 | End: 2017-07-13

## 2017-07-13 RX ADMIN — CEPHALEXIN 500 MG: 250 CAPSULE ORAL at 21:47

## 2017-07-13 ASSESSMENT — ENCOUNTER SYMPTOMS
BACK PAIN: 0
VOMITING: 0
DIARRHEA: 0
SHORTNESS OF BREATH: 0
ABDOMINAL PAIN: 0
COLOR CHANGE: 0
NAUSEA: 0
COUGH: 0

## 2017-07-14 LAB
C TRACH DNA GENITAL QL NAA+PROBE: NEGATIVE
CULTURE: NORMAL
CULTURE: NORMAL
Lab: NORMAL
N. GONORRHOEAE DNA: NEGATIVE
SPECIMEN DESCRIPTION: NORMAL
STATUS: NORMAL

## 2017-07-20 ENCOUNTER — ROUTINE PRENATAL (OUTPATIENT)
Dept: PERINATAL CARE | Age: 27
End: 2017-07-20
Payer: MEDICARE

## 2017-07-20 VITALS
TEMPERATURE: 98.5 F | WEIGHT: 111 LBS | RESPIRATION RATE: 16 BRPM | DIASTOLIC BLOOD PRESSURE: 55 MMHG | SYSTOLIC BLOOD PRESSURE: 94 MMHG | BODY MASS INDEX: 19.05 KG/M2 | HEART RATE: 83 BPM

## 2017-07-20 DIAGNOSIS — Z36.4 ANTENATAL SCREENING FOR FETAL GROWTH RETARDATION USING ULTRASONICS: ICD-10-CM

## 2017-07-20 DIAGNOSIS — J45.909 ASTHMA AFFECTING PREGNANCY, ANTEPARTUM: ICD-10-CM

## 2017-07-20 DIAGNOSIS — O35.BXX0 FETAL CARDIAC ECHOGENIC FOCUS, ANTEPARTUM, NOT APPLICABLE OR UNSPECIFIED FETUS: ICD-10-CM

## 2017-07-20 DIAGNOSIS — O99.332 TOBACCO USE DISORDER COMPLICATING PREGNANCY, CHILDBIRTH, OR PUERPERIUM, ANTEPARTUM, SECOND TRIMESTER: ICD-10-CM

## 2017-07-20 DIAGNOSIS — F17.200 TOBACCO USE DISORDER COMPLICATING PREGNANCY, CHILDBIRTH, OR PUERPERIUM, ANTEPARTUM, SECOND TRIMESTER: ICD-10-CM

## 2017-07-20 DIAGNOSIS — O35.BXX0 FETAL CARDIAC ANOMALY COMPLICATING PREGNANCY, ANTEPARTUM, NOT APPLICABLE OR UNSPECIFIED FETUS: ICD-10-CM

## 2017-07-20 DIAGNOSIS — Z3A.27 27 WEEKS GESTATION OF PREGNANCY: ICD-10-CM

## 2017-07-20 DIAGNOSIS — O26.12 LOW WEIGHT GAIN IN PREGNANCY, SECOND TRIMESTER: ICD-10-CM

## 2017-07-20 DIAGNOSIS — O99.519 ASTHMA AFFECTING PREGNANCY, ANTEPARTUM: ICD-10-CM

## 2017-07-20 PROCEDURE — 76816 OB US FOLLOW-UP PER FETUS: CPT | Performed by: OBSTETRICS & GYNECOLOGY

## 2017-07-20 PROCEDURE — 76820 UMBILICAL ARTERY ECHO: CPT | Performed by: OBSTETRICS & GYNECOLOGY

## 2017-08-03 ENCOUNTER — ROUTINE PRENATAL (OUTPATIENT)
Dept: OBGYN | Age: 27
End: 2017-08-03
Payer: MEDICARE

## 2017-08-03 ENCOUNTER — HOSPITAL ENCOUNTER (OUTPATIENT)
Age: 27
Setting detail: SPECIMEN
Discharge: HOME | End: 2017-08-03
Payer: MEDICARE

## 2017-08-03 VITALS
BODY MASS INDEX: 19.22 KG/M2 | HEART RATE: 84 BPM | WEIGHT: 112 LBS | DIASTOLIC BLOOD PRESSURE: 63 MMHG | SYSTOLIC BLOOD PRESSURE: 95 MMHG

## 2017-08-03 DIAGNOSIS — O09.91 HIGH-RISK PREGNANCY IN FIRST TRIMESTER: ICD-10-CM

## 2017-08-03 DIAGNOSIS — O09.93 HRP (HIGH RISK PREGNANCY), THIRD TRIMESTER: ICD-10-CM

## 2017-08-03 DIAGNOSIS — O09.93 HRP (HIGH RISK PREGNANCY), THIRD TRIMESTER: Primary | ICD-10-CM

## 2017-08-03 LAB
-: ABNORMAL
AMORPHOUS: ABNORMAL
BACTERIA: ABNORMAL
BILIRUBIN URINE: NEGATIVE
CASTS UA: ABNORMAL /LPF (ref 0–8)
COLOR: YELLOW
CRYSTALS, UA: ABNORMAL /HPF
DIRECT EXAM: ABNORMAL
EPITHELIAL CELLS UA: ABNORMAL /HPF (ref 0–5)
GLUCOSE URINE: NEGATIVE
KETONES, URINE: NEGATIVE
LEUKOCYTE ESTERASE, URINE: NEGATIVE
Lab: ABNORMAL
MUCUS: ABNORMAL
NITRITE, URINE: NEGATIVE
OTHER OBSERVATIONS UA: ABNORMAL
PH UA: 7.5 (ref 5–8)
PROTEIN UA: ABNORMAL
RBC UA: ABNORMAL /HPF (ref 0–4)
RENAL EPITHELIAL, UA: ABNORMAL /HPF
SPECIFIC GRAVITY UA: 1.02 (ref 1–1.03)
SPECIMEN DESCRIPTION: ABNORMAL
STATUS: ABNORMAL
TRICHOMONAS: ABNORMAL
TURBIDITY: CLEAR
URINE HGB: NEGATIVE
UROBILINOGEN, URINE: NORMAL
WBC UA: ABNORMAL /HPF (ref 0–5)
YEAST: ABNORMAL

## 2017-08-03 PROCEDURE — 99213 OFFICE O/P EST LOW 20 MIN: CPT | Performed by: OBSTETRICS & GYNECOLOGY

## 2017-08-03 RX ORDER — CEPHALEXIN 500 MG/1
500 CAPSULE ORAL 4 TIMES DAILY
Qty: 40 CAPSULE | Refills: 0 | Status: SHIPPED | OUTPATIENT
Start: 2017-08-03 | End: 2017-08-13

## 2017-08-09 DIAGNOSIS — B37.31 CANDIDAL VAGINITIS: Primary | ICD-10-CM

## 2017-08-09 RX ORDER — FLUCONAZOLE 150 MG/1
150 TABLET ORAL ONCE
Qty: 1 TABLET | Refills: 2 | Status: SHIPPED | OUTPATIENT
Start: 2017-08-09 | End: 2017-08-09

## 2017-08-10 ENCOUNTER — TELEPHONE (OUTPATIENT)
Dept: OBGYN | Age: 27
End: 2017-08-10

## 2017-08-21 ENCOUNTER — ROUTINE PRENATAL (OUTPATIENT)
Dept: OBGYN | Age: 27
End: 2017-08-21
Payer: MEDICARE

## 2017-08-21 ENCOUNTER — HOSPITAL ENCOUNTER (OUTPATIENT)
Age: 27
Discharge: HOME | End: 2017-08-21
Attending: OBSTETRICS & GYNECOLOGY | Admitting: OBSTETRICS & GYNECOLOGY
Payer: MEDICARE

## 2017-08-21 VITALS
WEIGHT: 114 LBS | BODY MASS INDEX: 19.57 KG/M2 | SYSTOLIC BLOOD PRESSURE: 115 MMHG | DIASTOLIC BLOOD PRESSURE: 96 MMHG | HEART RATE: 69 BPM

## 2017-08-21 VITALS — HEART RATE: 79 BPM | TEMPERATURE: 98 F | SYSTOLIC BLOOD PRESSURE: 97 MMHG | DIASTOLIC BLOOD PRESSURE: 83 MMHG

## 2017-08-21 DIAGNOSIS — O35.BXX0 FETAL CARDIAC ANOMALY COMPLICATING PREGNANCY, ANTEPARTUM, NOT APPLICABLE OR UNSPECIFIED FETUS: ICD-10-CM

## 2017-08-21 DIAGNOSIS — O35.BXX0 FETAL CARDIAC ECHOGENIC FOCUS, ANTEPARTUM, NOT APPLICABLE OR UNSPECIFIED FETUS: ICD-10-CM

## 2017-08-21 PROBLEM — O28.8 NST (NON-STRESS TEST) NONREACTIVE: Status: ACTIVE | Noted: 2017-08-21

## 2017-08-21 PROCEDURE — 99211 OFF/OP EST MAY X REQ PHY/QHP: CPT | Performed by: OBSTETRICS & GYNECOLOGY

## 2017-08-21 PROCEDURE — 59025 FETAL NON-STRESS TEST: CPT | Performed by: OBSTETRICS & GYNECOLOGY

## 2017-08-21 PROCEDURE — 76818 FETAL BIOPHYS PROFILE W/NST: CPT

## 2017-08-21 PROCEDURE — 99213 OFFICE O/P EST LOW 20 MIN: CPT

## 2017-08-21 PROCEDURE — 76818 FETAL BIOPHYS PROFILE W/NST: CPT | Performed by: OBSTETRICS & GYNECOLOGY

## 2017-08-21 RX ORDER — ONDANSETRON 2 MG/ML
4 INJECTION INTRAMUSCULAR; INTRAVENOUS EVERY 6 HOURS PRN
Status: DISCONTINUED | OUTPATIENT
Start: 2017-08-21 | End: 2017-08-21 | Stop reason: HOSPADM

## 2017-08-21 RX ORDER — ACETAMINOPHEN 500 MG
1000 TABLET ORAL EVERY 6 HOURS PRN
Status: DISCONTINUED | OUTPATIENT
Start: 2017-08-21 | End: 2017-08-21 | Stop reason: HOSPADM

## 2017-08-24 ENCOUNTER — ROUTINE PRENATAL (OUTPATIENT)
Dept: PERINATAL CARE | Age: 27
End: 2017-08-24
Payer: MEDICARE

## 2017-08-24 VITALS
WEIGHT: 113 LBS | DIASTOLIC BLOOD PRESSURE: 60 MMHG | RESPIRATION RATE: 16 BRPM | SYSTOLIC BLOOD PRESSURE: 104 MMHG | HEART RATE: 80 BPM | BODY MASS INDEX: 19.4 KG/M2 | TEMPERATURE: 97.7 F

## 2017-08-24 DIAGNOSIS — Z3A.32 32 WEEKS GESTATION OF PREGNANCY: ICD-10-CM

## 2017-08-24 DIAGNOSIS — O99.519 ASTHMA AFFECTING PREGNANCY, ANTEPARTUM: ICD-10-CM

## 2017-08-24 DIAGNOSIS — O26.13 LOW WEIGHT GAIN IN PREGNANCY, THIRD TRIMESTER: ICD-10-CM

## 2017-08-24 DIAGNOSIS — Z36.4 ANTENATAL SCREENING FOR FETAL GROWTH RETARDATION USING ULTRASONICS: ICD-10-CM

## 2017-08-24 DIAGNOSIS — J45.909 ASTHMA AFFECTING PREGNANCY, ANTEPARTUM: ICD-10-CM

## 2017-08-24 DIAGNOSIS — Z13.89 ENCOUNTER FOR ROUTINE SCREENING FOR MALFORMATION USING ULTRASONICS: ICD-10-CM

## 2017-08-24 DIAGNOSIS — O99.333 TOBACCO USE DISORDER COMPLICATING PREGNANCY, CHILDBIRTH, OR PUERPERIUM, ANTEPARTUM, THIRD TRIMESTER: ICD-10-CM

## 2017-08-24 DIAGNOSIS — O35.BXX0 FETAL CARDIAC ANOMALY COMPLICATING PREGNANCY, ANTEPARTUM, NOT APPLICABLE OR UNSPECIFIED FETUS: ICD-10-CM

## 2017-08-24 DIAGNOSIS — F17.200 TOBACCO USE DISORDER COMPLICATING PREGNANCY, CHILDBIRTH, OR PUERPERIUM, ANTEPARTUM, THIRD TRIMESTER: ICD-10-CM

## 2017-08-24 PROCEDURE — 76820 UMBILICAL ARTERY ECHO: CPT | Performed by: OBSTETRICS & GYNECOLOGY

## 2017-08-24 PROCEDURE — 76805 OB US >/= 14 WKS SNGL FETUS: CPT | Performed by: OBSTETRICS & GYNECOLOGY

## 2017-08-24 PROCEDURE — 76818 FETAL BIOPHYS PROFILE W/NST: CPT | Performed by: OBSTETRICS & GYNECOLOGY

## 2017-09-11 PROBLEM — O09.893 NONCOMPLIANT PREGNANT PATIENT IN THIRD TRIMESTER: Status: ACTIVE | Noted: 2017-09-11

## 2017-09-11 PROBLEM — Z91.199 NONCOMPLIANT PREGNANT PATIENT IN THIRD TRIMESTER: Status: ACTIVE | Noted: 2017-09-11

## 2017-10-16 ENCOUNTER — TELEPHONE (OUTPATIENT)
Dept: OBGYN | Age: 27
End: 2017-10-16

## 2017-10-16 DIAGNOSIS — L20.9 ATOPIC DERMATITIS, MILD: ICD-10-CM

## 2017-10-17 RX ORDER — TRIAMCINOLONE ACETONIDE 0.25 MG/G
OINTMENT TOPICAL
Qty: 45 G | Refills: 2 | Status: SHIPPED | OUTPATIENT
Start: 2017-10-17 | End: 2019-06-06

## 2017-10-22 DIAGNOSIS — L70.9 ACNE, UNSPECIFIED ACNE TYPE: ICD-10-CM

## 2017-10-23 RX ORDER — ERYTHROMYCIN AND BENZOYL PEROXIDE 30; 50 MG/G; MG/G
GEL TOPICAL
Qty: 23.3 G | Refills: 0 | Status: SHIPPED | OUTPATIENT
Start: 2017-10-23 | End: 2018-07-26 | Stop reason: SDUPTHER

## 2017-10-23 NOTE — TELEPHONE ENCOUNTER
Refill on Erythromycin-Benzoyl Gel. Last seen on 2/8/17. Patient No Show on 3/8/17. Left VM for patient to call and schedule appointment. Next Visit Date:  No future appointments.     Health Maintenance   Topic Date Due    DTaP/Tdap/Td vaccine (1 - Tdap) 09/19/2009    Pneumococcal med risk (1 of 1 - PPSV23) 09/19/2009    Flu vaccine (1) 09/01/2017    Cervical cancer screen  10/15/2017    HIV screen  Completed       No results found for: LABA1C          ( goal A1C is < 7)   No results found for: LABMICR  LDL Cholesterol (mg/dL)   Date Value   10/15/2014 81       (goal LDL is <100)   AST (U/L)   Date Value   02/08/2017 19     ALT (U/L)   Date Value   02/08/2017 16     BUN (mg/dL)   Date Value   02/08/2017 11     BP Readings from Last 3 Encounters:   08/24/17 104/60   08/21/17 97/83   08/21/17 (!) 115/96          (goal 120/80)    All Future Testing planned in [facility]  Lab Frequency Next Occurrence   Rubella Antibody, IgG Once 08/03/2017   CBC Once 08/03/2017               Patient Active Problem List:     Asthma     Tobacco use     Marijuana smoker (HCC)     Rh+/ R Unk/ Gbs unk     H/O Postpartum depression     Vaginal bleeding in pregnancy     Single umbilical artery     Fetal cardiac echogenic focus, antepartum     Tobacco use disorder complicating pregnancy, childbirth, or puerperium, antepartum     Low weight gain in pregnancy     Asthma affecting pregnancy, antepartum     Cardiac echogenic foci & Bicuspid aortic valves     Nonreactive NST      31 weeks gestation of pregnancy     Noncompliant pregnant patient in third trimester

## 2018-01-10 ENCOUNTER — OFFICE VISIT (OUTPATIENT)
Dept: OBGYN | Age: 28
End: 2018-01-10
Payer: MEDICARE

## 2018-01-10 VITALS
HEIGHT: 64 IN | WEIGHT: 113 LBS | DIASTOLIC BLOOD PRESSURE: 68 MMHG | BODY MASS INDEX: 19.29 KG/M2 | HEART RATE: 80 BPM | SYSTOLIC BLOOD PRESSURE: 113 MMHG

## 2018-01-10 DIAGNOSIS — Z30.09 FAMILY PLANNING COUNSELING: Primary | ICD-10-CM

## 2018-01-10 LAB
CONTROL: NORMAL
PREGNANCY TEST URINE, POC: NEGATIVE

## 2018-01-10 PROCEDURE — 4004F PT TOBACCO SCREEN RCVD TLK: CPT | Performed by: OBSTETRICS & GYNECOLOGY

## 2018-01-10 PROCEDURE — 81025 URINE PREGNANCY TEST: CPT | Performed by: OBSTETRICS & GYNECOLOGY

## 2018-01-10 PROCEDURE — G8420 CALC BMI NORM PARAMETERS: HCPCS | Performed by: OBSTETRICS & GYNECOLOGY

## 2018-01-10 PROCEDURE — G8427 DOCREV CUR MEDS BY ELIG CLIN: HCPCS | Performed by: OBSTETRICS & GYNECOLOGY

## 2018-01-10 PROCEDURE — 99213 OFFICE O/P EST LOW 20 MIN: CPT | Performed by: OBSTETRICS & GYNECOLOGY

## 2018-01-10 PROCEDURE — G8484 FLU IMMUNIZE NO ADMIN: HCPCS | Performed by: OBSTETRICS & GYNECOLOGY

## 2018-01-10 RX ORDER — MEDROXYPROGESTERONE ACETATE 150 MG/ML
150 INJECTION, SUSPENSION INTRAMUSCULAR
Qty: 1 ML | Refills: 3 | Status: SHIPPED | OUTPATIENT
Start: 2018-01-10 | End: 2020-02-06

## 2018-01-10 RX ORDER — MEDROXYPROGESTERONE ACETATE 150 MG/ML
150 INJECTION, SUSPENSION INTRAMUSCULAR ONCE
Status: COMPLETED | OUTPATIENT
Start: 2018-01-10 | End: 2018-01-10

## 2018-01-10 RX ADMIN — MEDROXYPROGESTERONE ACETATE 150 MG: 150 INJECTION, SUSPENSION INTRAMUSCULAR at 16:27

## 2018-01-10 NOTE — PROGRESS NOTES
Years of education: N/A     Occupational History    Not on file. Social History Main Topics    Smoking status: Current Every Day Smoker     Years: 13.00     Types: Cigarettes    Smokeless tobacco: Never Used      Comment: \"5-8 cigs per day. \"  8/24/2017    Alcohol use Yes      Comment: social.   Pt has some alcohol in prior to knowledge of preg.  Drug use: No      Comment: last used week of march 13    Sexual activity: Yes     Partners: Male     Other Topics Concern    Not on file     Social History Narrative    Currently lives with her son and his father. Feels safe at home. Does not work. Started college but did not finish. MEDICATIONS:  Current Outpatient Prescriptions   Medication Sig Dispense Refill    medroxyPROGESTERone (DEPO-PROVERA) 150 MG/ML injection Inject 1 mL into the muscle every 3 months 1 mL 3    mometasone-formoterol (DULERA) 200-5 MCG/ACT inhaler Inhale 2 puffs into the lungs every 12 hours      VENTOLIN  (90 Base) MCG/ACT inhaler inhale 2 puffs by mouth every 6 hours if needed for wheezing 1 Inhaler 2    albuterol (PROVENTIL) (2.5 MG/3ML) 0.083% nebulizer solution Take 3 mLs by nebulization every 6 hours as needed for Wheezing 120 each 3    Prenatal Vit-Fe Fumarate-FA (PRENATAL VITAMIN) 27-0.8 MG TABS Take 1 tablet by mouth daily 90 tablet 1    benzoyl peroxide-erythromycin (BENZAMYCIN) 5-3 % gel apply to affected area twice a day 23.3 g 0    triamcinolone (KENALOG) 0.025 % ointment apply topically twice a day 45 g 2     No current facility-administered medications for this visit. ALLERGIES:  Allergies as of 01/10/2018    (No Known Allergies)         REVIEW OF SYSTEMS:       A minimum of an eleven point review of systems was completed. Review Of Systems (11 point):  Constitutional: No fever, chills or malaise;  No weight change or fatigue  Head and Eyes: No vision, Headache, Dizziness or trauma in last 12 months  ENT ROS: No hearing, Tinnitis, sinus or taste problems  Hematological and Lymphatic ROS:No Lymphoma, Von Willebrand's, Hemophillia or Bleeding History  Psych ROS: No Depression, Homicidal thoughts,suicidal thoughts, or anxiety  Breast ROS: No prior breast abnormalities or lumps  Respiratory ROS: No SOB, Pneumoniae,Cough, or Pulmonary Embolism History  Cardiovascular ROS: No Chest Pain with Exertion, Palpitations, Syncope, Edema, Arrhythmia  Gastrointestinal ROS: No Indigestion, Heartburn, Nausea, vomiting, Diarrhea, Constipation,or Bowel Changes; No Bloody Stools or melena  Genito-Urinary ROS: No Dysuria, Hematuria or Nocturia. No Urinary Incontinence or Vaginal Discharge  Musculoskeletal ROS: No Arthralgia, Arthritis,Gout,Osteoporosis or Rheumatism  Neurological ROS: No CVA, Migraines, Epilepsy, Seizure Hx, or Limb Weakness  Dermatological ROS: No Rash, Itching, Hives, Mole Changes or Cancer          Blood pressure 113/68, pulse 80, height 5' 4\" (1.626 m), weight 113 lb (51.3 kg), unknown if currently breastfeeding. Abdomen: Soft non-tender; good bowel sounds. No guarding, rebound or rigidity. No CVA tenderness bilaterally. Extremities: No calf tenderness, DTR 2/4, and No edema bilaterally        Assessment:  1. Family planning counseling  POCT urine pregnancy     Patient Active Problem List    Diagnosis Date Noted    Asthma 04/30/2015     Priority: Medium     Uses inhaler and nebulizer prn. States well controlled      Tobacco use 04/30/2015     Priority: Medium     Pt counseled on maternal/fetal risk factors. Pt verbalizes understanding   Pt desires to quit  6/8/17- Patient continues to smoke. 8 daily      Noncompliant pregnant patient in third trimester 09/11/2017     Pt has not shown for her prenatal appts, NSTs and MFM appts       31 weeks gestation of pregnancy     Nonreactive NST  08/21/2017     Nonreactive NST in [facility] clinic on 8/21/17. Pt sent for BPP.        Cardiac echogenic foci & Bicuspid aortic valves 06/22/2017 Evaluation completed by Dr. Sp eMeks:  1. Normal fetal cardiac anatomy  2. Normal fetal biventricular size and systolic function        a) Heart to chest ration = 29%  3. Normal cardiac rhythm        a) Fetal heart rate 150 bpm  4. No evidence of congenital heart defects.       This is a fetal echocardiogram performed at 25 weeks gestation, the study reveals a single fetus in the cephalic position.      RECOMMENDATIONS   1. She should be able to deliver at [address] per her [facility] discretion  2. Further fetal Cardiology follow up with fetal echocardiography is not recommended unless new concerns arise. 3.  ECHO or pediatric cardiology consult is needed if the baby is found to have any cardiac symptoms such as murmur, desaturation, cyanosis, etc       Single umbilical artery      Start fetal testing at 32 wks      Fetal cardiac echogenic focus, antepartum 2017    Tobacco use disorder complicating pregnancy, childbirth, or puerperium, antepartum 2017    Low weight gain in pregnancy 2017    Asthma affecting pregnancy, antepartum 2017    Vaginal bleeding in pregnancy 2017    Marijuana smoker ([address] 2017     Pt counseled not recommended in pregnancy. Maternal/Fetal risks reviewed. Pt verbalizes understanding. 17- Patient states she's smoking occasionally.  Rh+/ R Unk/ Gbs unk 2017     PRENATAL LAB RESULTS:   Blood Type/Rh: A pos  Antibody Screen: negative  Hemoglobin, Hematocrit, Platelets: 26.7 / 50.1 / 171  Rubella: NOT DONE  T.  Pallidum, IgG: non-reactive   Hepatitis B Surface Antigen: non-reactive   HIV: non-reactive   Sickle Cell Screen: not done  Gonorrhea: negative  Chlamydia: negative  Urine culture: negative, date: 2017    1 hour Glucose Tolerance Test: p     Glucose Fasting: p  1 hour Glucose Tolerance Test: p  2 hour Glucose Tolerance Test: p  3 hour Glucose Tolerance Test: p    Group B Strep:  not done    Cystic Fibrosis Screen:  Not done  First Trimester Screen:  Not done  MSAFP/Multiple Markers:  Normal  NIPT: negative  Anatomy US: breech, posterior, cardiac echogenic foci, 2 vessel cord       H/O Postpartum depression 03/28/2017     History of PP depression with previous preg, no meds             PLAN:  Pt given Rx for depo provera - pt to  Rx and return for injection today  Patient due for annual/pap smear - when she returns for next depo injection, will schedule her for annual at same time  Return 11-12 weeks, for next depo injection and annual/pap smear. Repeat Annual every 1 year  Cervical Cytology Evaluation begins at 24years old. If Negative Cytology, Follow-up screening per current guidelines. Counseled on preventative health maintenance follow-up. Patient was seen with total face to face time of 15 minutes. More than 50% of this visit was counseling and education regarding The encounter diagnosis was Family planning counseling. and Contraception (Restart depo, vaginal delivery 9/26/17, dep[facility] at [facility])   as well as  counseling on preventative health maintenance follow-up.     Pamela Pollock DO

## 2018-02-22 ENCOUNTER — OFFICE VISIT (OUTPATIENT)
Dept: PODIATRY | Age: 28
End: 2018-02-22
Payer: MEDICARE

## 2018-02-22 VITALS — WEIGHT: 117 LBS | BODY MASS INDEX: 19.97 KG/M2 | HEIGHT: 64 IN

## 2018-02-22 DIAGNOSIS — D23.71 BENIGN NEOPLASM OF SKIN OF LOWER LIMB, INCLUDING HIP, RIGHT: ICD-10-CM

## 2018-02-22 DIAGNOSIS — D23.72 BENIGN NEOPLASM OF SKIN OF LOWER LIMB, INCLUDING HIP, LEFT: ICD-10-CM

## 2018-02-22 DIAGNOSIS — M79.604 BILATERAL LOWER EXTREMITY PAIN: Primary | ICD-10-CM

## 2018-02-22 DIAGNOSIS — M79.605 BILATERAL LOWER EXTREMITY PAIN: Primary | ICD-10-CM

## 2018-02-22 PROCEDURE — G8484 FLU IMMUNIZE NO ADMIN: HCPCS | Performed by: PODIATRIST

## 2018-02-22 PROCEDURE — 17110 DESTRUCTION B9 LES UP TO 14: CPT | Performed by: PODIATRIST

## 2018-02-22 PROCEDURE — 4004F PT TOBACCO SCREEN RCVD TLK: CPT | Performed by: PODIATRIST

## 2018-02-22 PROCEDURE — G8427 DOCREV CUR MEDS BY ELIG CLIN: HCPCS | Performed by: PODIATRIST

## 2018-02-22 PROCEDURE — 99213 OFFICE O/P EST LOW 20 MIN: CPT | Performed by: PODIATRIST

## 2018-02-22 PROCEDURE — G8420 CALC BMI NORM PARAMETERS: HCPCS | Performed by: PODIATRIST

## 2018-04-10 ENCOUNTER — NURSE ONLY (OUTPATIENT)
Dept: OBGYN | Age: 28
End: 2018-04-10
Payer: MEDICARE

## 2018-04-10 VITALS — HEIGHT: 64 IN | WEIGHT: 114.9 LBS | BODY MASS INDEX: 19.62 KG/M2 | TEMPERATURE: 98.1 F

## 2018-04-10 DIAGNOSIS — Z30.09 FAMILY PLANNING: Primary | ICD-10-CM

## 2018-04-10 PROCEDURE — 96372 THER/PROPH/DIAG INJ SC/IM: CPT | Performed by: OBSTETRICS & GYNECOLOGY

## 2018-04-10 PROCEDURE — 99211 OFF/OP EST MAY X REQ PHY/QHP: CPT | Performed by: OBSTETRICS & GYNECOLOGY

## 2018-04-10 RX ORDER — MEDROXYPROGESTERONE ACETATE 150 MG/ML
150 INJECTION, SUSPENSION INTRAMUSCULAR ONCE
Status: COMPLETED | OUTPATIENT
Start: 2018-04-10 | End: 2018-04-10

## 2018-04-10 RX ADMIN — MEDROXYPROGESTERONE ACETATE 150 MG: 150 INJECTION, SUSPENSION INTRAMUSCULAR at 14:07

## 2018-05-08 ENCOUNTER — OFFICE VISIT (OUTPATIENT)
Dept: OBGYN | Age: 28
End: 2018-05-08
Payer: MEDICARE

## 2018-05-08 ENCOUNTER — HOSPITAL ENCOUNTER (OUTPATIENT)
Age: 28
Setting detail: SPECIMEN
Discharge: HOME | End: 2018-05-08
Payer: MEDICARE

## 2018-05-08 VITALS
SYSTOLIC BLOOD PRESSURE: 114 MMHG | WEIGHT: 113.6 LBS | BODY MASS INDEX: 19.5 KG/M2 | HEART RATE: 76 BPM | DIASTOLIC BLOOD PRESSURE: 71 MMHG

## 2018-05-08 DIAGNOSIS — Z11.3 SCREENING FOR STD (SEXUALLY TRANSMITTED DISEASE): ICD-10-CM

## 2018-05-08 DIAGNOSIS — Z01.419 WOMEN'S ANNUAL ROUTINE GYNECOLOGICAL EXAMINATION: Primary | ICD-10-CM

## 2018-05-08 DIAGNOSIS — Z00.00 PREVENTATIVE HEALTH CARE: ICD-10-CM

## 2018-05-08 LAB
DIRECT EXAM: NORMAL
HAV IGM SER IA-ACNC: NONREACTIVE
HEPATITIS B CORE IGM ANTIBODY: NONREACTIVE
HEPATITIS B SURFACE ANTIGEN: NONREACTIVE
HEPATITIS C ANTIBODY: NONREACTIVE
HIV AG/AB: NONREACTIVE
Lab: NORMAL
SPECIMEN DESCRIPTION: NORMAL
STATUS: NORMAL
T. PALLIDUM, IGG: NONREACTIVE

## 2018-05-08 PROCEDURE — 87510 GARDNER VAG DNA DIR PROBE: CPT

## 2018-05-08 PROCEDURE — 87491 CHLMYD TRACH DNA AMP PROBE: CPT

## 2018-05-08 PROCEDURE — P3000 SCREEN PAP BY TECH W MD SUPV: HCPCS | Performed by: OBSTETRICS & GYNECOLOGY

## 2018-05-08 PROCEDURE — 87660 TRICHOMONAS VAGIN DIR PROBE: CPT

## 2018-05-08 PROCEDURE — 99395 PREV VISIT EST AGE 18-39: CPT | Performed by: OBSTETRICS & GYNECOLOGY

## 2018-05-08 PROCEDURE — 87591 N.GONORRHOEAE DNA AMP PROB: CPT

## 2018-05-08 PROCEDURE — 99213 OFFICE O/P EST LOW 20 MIN: CPT | Performed by: OBSTETRICS & GYNECOLOGY

## 2018-05-08 PROCEDURE — 87389 HIV-1 AG W/HIV-1&-2 AB AG IA: CPT

## 2018-05-08 PROCEDURE — G0145 SCR C/V CYTO,THINLAYER,RESCR: HCPCS

## 2018-05-08 PROCEDURE — 86780 TREPONEMA PALLIDUM: CPT

## 2018-05-08 PROCEDURE — 87801 DETECT AGNT MULT DNA AMPLI: CPT | Performed by: OBSTETRICS & GYNECOLOGY

## 2018-05-08 PROCEDURE — 36415 COLL VENOUS BLD VENIPUNCTURE: CPT

## 2018-05-08 PROCEDURE — 87480 CANDIDA DNA DIR PROBE: CPT | Performed by: OBSTETRICS & GYNECOLOGY

## 2018-05-08 PROCEDURE — 87480 CANDIDA DNA DIR PROBE: CPT

## 2018-05-08 PROCEDURE — 80074 ACUTE HEPATITIS PANEL: CPT

## 2018-05-09 LAB
C TRACH DNA GENITAL QL NAA+PROBE: NEGATIVE
N. GONORRHOEAE DNA: NEGATIVE

## 2018-05-17 ENCOUNTER — OFFICE VISIT (OUTPATIENT)
Dept: PODIATRY | Age: 28
End: 2018-05-17
Payer: MEDICARE

## 2018-05-17 VITALS — HEART RATE: 68 BPM | WEIGHT: 117 LBS | BODY MASS INDEX: 19.97 KG/M2 | HEIGHT: 64 IN | RESPIRATION RATE: 16 BRPM

## 2018-05-17 DIAGNOSIS — D23.71 BENIGN NEOPLASM OF SKIN OF LOWER LIMB, INCLUDING HIP, RIGHT: ICD-10-CM

## 2018-05-17 DIAGNOSIS — M79.671 PAIN IN BOTH FEET: Primary | ICD-10-CM

## 2018-05-17 DIAGNOSIS — M79.672 PAIN IN BOTH FEET: Primary | ICD-10-CM

## 2018-05-17 DIAGNOSIS — D23.72 BENIGN NEOPLASM OF SKIN OF LOWER LIMB, INCLUDING HIP, LEFT: ICD-10-CM

## 2018-05-17 PROCEDURE — G8427 DOCREV CUR MEDS BY ELIG CLIN: HCPCS | Performed by: PODIATRIST

## 2018-05-17 PROCEDURE — 17110 DESTRUCTION B9 LES UP TO 14: CPT | Performed by: PODIATRIST

## 2018-05-17 PROCEDURE — G8420 CALC BMI NORM PARAMETERS: HCPCS | Performed by: PODIATRIST

## 2018-05-17 PROCEDURE — 4004F PT TOBACCO SCREEN RCVD TLK: CPT | Performed by: PODIATRIST

## 2018-06-05 LAB — CYTOLOGY REPORT: NORMAL

## 2018-06-25 ENCOUNTER — OFFICE VISIT (OUTPATIENT)
Dept: PODIATRY | Age: 28
End: 2018-06-25
Payer: MEDICARE

## 2018-06-25 VITALS — HEIGHT: 64 IN | RESPIRATION RATE: 16 BRPM | BODY MASS INDEX: 19.97 KG/M2 | WEIGHT: 117 LBS | HEART RATE: 72 BPM

## 2018-06-25 DIAGNOSIS — D23.72 BENIGN NEOPLASM OF SKIN OF LEFT FOOT: ICD-10-CM

## 2018-06-25 DIAGNOSIS — M79.605 PAIN IN BOTH LOWER EXTREMITIES: ICD-10-CM

## 2018-06-25 DIAGNOSIS — R26.2 DIFFICULTY WALKING: ICD-10-CM

## 2018-06-25 DIAGNOSIS — D23.71 BENIGN NEOPLASM OF SKIN OF RIGHT FOOT: Primary | ICD-10-CM

## 2018-06-25 DIAGNOSIS — M79.604 PAIN IN BOTH LOWER EXTREMITIES: ICD-10-CM

## 2018-06-25 PROCEDURE — G8427 DOCREV CUR MEDS BY ELIG CLIN: HCPCS | Performed by: PODIATRIST

## 2018-06-25 PROCEDURE — G8420 CALC BMI NORM PARAMETERS: HCPCS | Performed by: PODIATRIST

## 2018-06-25 PROCEDURE — 4004F PT TOBACCO SCREEN RCVD TLK: CPT | Performed by: PODIATRIST

## 2018-06-25 PROCEDURE — 17110 DESTRUCTION B9 LES UP TO 14: CPT | Performed by: PODIATRIST

## 2018-07-03 ENCOUNTER — NURSE ONLY (OUTPATIENT)
Dept: OBGYN | Age: 28
End: 2018-07-03
Payer: MEDICARE

## 2018-07-03 VITALS
DIASTOLIC BLOOD PRESSURE: 75 MMHG | HEIGHT: 64 IN | HEART RATE: 91 BPM | TEMPERATURE: 98.3 F | WEIGHT: 112 LBS | RESPIRATION RATE: 17 BRPM | SYSTOLIC BLOOD PRESSURE: 109 MMHG | BODY MASS INDEX: 19.12 KG/M2

## 2018-07-03 DIAGNOSIS — Z30.09 FAMILY PLANNING: Primary | ICD-10-CM

## 2018-07-03 PROCEDURE — 99211 OFF/OP EST MAY X REQ PHY/QHP: CPT | Performed by: OBSTETRICS & GYNECOLOGY

## 2018-07-03 PROCEDURE — 96372 THER/PROPH/DIAG INJ SC/IM: CPT | Performed by: OBSTETRICS & GYNECOLOGY

## 2018-07-03 RX ORDER — MEDROXYPROGESTERONE ACETATE 150 MG/ML
150 INJECTION, SUSPENSION INTRAMUSCULAR ONCE
Status: COMPLETED | OUTPATIENT
Start: 2018-07-03 | End: 2018-07-03

## 2018-07-03 RX ADMIN — MEDROXYPROGESTERONE ACETATE 150 MG: 150 INJECTION, SUSPENSION INTRAMUSCULAR at 10:42

## 2018-07-25 ENCOUNTER — OFFICE VISIT (OUTPATIENT)
Dept: PODIATRY | Age: 28
End: 2018-07-25
Payer: MEDICARE

## 2018-07-25 VITALS — HEIGHT: 64 IN | WEIGHT: 117 LBS | BODY MASS INDEX: 19.97 KG/M2

## 2018-07-25 DIAGNOSIS — M79.605 PAIN IN BOTH LOWER EXTREMITIES: Primary | ICD-10-CM

## 2018-07-25 DIAGNOSIS — M79.604 PAIN IN BOTH LOWER EXTREMITIES: Primary | ICD-10-CM

## 2018-07-25 DIAGNOSIS — L85.3 XEROSIS CUTIS: ICD-10-CM

## 2018-07-25 DIAGNOSIS — D23.72 BENIGN NEOPLASM OF SKIN OF LOWER LIMB, INCLUDING HIP, LEFT: ICD-10-CM

## 2018-07-25 DIAGNOSIS — B35.1 DERMATOPHYTOSIS OF NAIL: ICD-10-CM

## 2018-07-25 DIAGNOSIS — R26.2 DIFFICULTY WALKING: ICD-10-CM

## 2018-07-25 DIAGNOSIS — D23.71 BENIGN NEOPLASM OF SKIN OF LOWER LIMB, INCLUDING HIP, RIGHT: ICD-10-CM

## 2018-07-25 PROCEDURE — G8420 CALC BMI NORM PARAMETERS: HCPCS | Performed by: PODIATRIST

## 2018-07-25 PROCEDURE — 11721 DEBRIDE NAIL 6 OR MORE: CPT | Performed by: PODIATRIST

## 2018-07-25 PROCEDURE — G8427 DOCREV CUR MEDS BY ELIG CLIN: HCPCS | Performed by: PODIATRIST

## 2018-07-25 PROCEDURE — 4004F PT TOBACCO SCREEN RCVD TLK: CPT | Performed by: PODIATRIST

## 2018-07-25 PROCEDURE — 17110 DESTRUCTION B9 LES UP TO 14: CPT | Performed by: PODIATRIST

## 2018-07-25 RX ORDER — AMMONIUM LACTATE 12 G/100G
CREAM TOPICAL
Qty: 1 BOTTLE | Refills: 4 | Status: SHIPPED | OUTPATIENT
Start: 2018-07-25 | End: 2020-08-20

## 2018-07-25 NOTE — PROGRESS NOTES
[facility]  [facility] [address]  [address] Michel Rodríguez  Dept: [phone]  Dept Fax: [phone]    BENIGN NEOPLASM PROGRESS NOTE  Date of patient's visit: 7/25/2018  Patient's Name:  Mercedes Cazares YOB: 1990            Patient Care Team:  Rama Walls MD as PCP - General (Internal Medicine)  Gianna Day DO as Resident (Obstetrics & Gynecology)  Xavier Chauhan MD as Obstetrician (Perinatology)  Sabra Gustafson DPM as Physician (Podiatry)  Sabra Gustafson DPM as Physician (Podiatry)    Chief Complaint   Patient presents with    Foot Pain    Benign Neoplasm     B/L         Mercedes Cazares is a 32 y.o. female with the chief complaint of painful lesions on her   feet. . They have been present for several month(s) duration. The lesions are present on the bilateral foot. The patient has 4 lesion that is deep seated and has a painful core. Treatment has included previous podiatry treatment. She also relates to painful ingrown nails that she is unable to trim herself. Review of Systems    ROS  Review of Systems - History obtained from chart review and the patient  General ROS: negative for - chills, fatigue, fever, night sweats or weight gain  Constitutional: Negative for chills, diaphoresis, fatigue, fever and unexpected weight change. Musculoskeletal: Positive for arthralgias, gait problem and joint swelling. Neurological ROS: negative for - behavioral changes, confusion, headaches or seizures. Negative for weakness and numbness. Dermatological ROS: negative for - mole changes, rash  Cardiovascular: Negative for leg swelling. Gastrointestinal: Negative for constipation, diarrhea, nausea and vomiting. Dermatologic Exam:  Skin lesion/ulceration Absent . Skin No rashes or nodules noted. .       Musculoskeletal:     1st MPJ ROM decreased, Bilateral.  Muscle strength 5/5, Bilateral.  Pain present upon palpation of toenails 1-5, Bilateral.

## 2018-07-26 ENCOUNTER — OFFICE VISIT (OUTPATIENT)
Dept: INTERNAL MEDICINE | Age: 28
End: 2018-07-26
Payer: MEDICARE

## 2018-07-26 VITALS
BODY MASS INDEX: 19.63 KG/M2 | HEART RATE: 79 BPM | DIASTOLIC BLOOD PRESSURE: 84 MMHG | SYSTOLIC BLOOD PRESSURE: 135 MMHG | HEIGHT: 64 IN | WEIGHT: 115 LBS

## 2018-07-26 DIAGNOSIS — Z23 NEED FOR DIPHTHERIA-TETANUS-PERTUSSIS (TDAP) VACCINE: ICD-10-CM

## 2018-07-26 DIAGNOSIS — Z11.1 SCREENING-PULMONARY TB: ICD-10-CM

## 2018-07-26 DIAGNOSIS — Z78.9 RUBELLA IMMUNE STATUS NOT KNOWN: ICD-10-CM

## 2018-07-26 DIAGNOSIS — J45.40 MODERATE PERSISTENT ASTHMA WITHOUT COMPLICATION: Primary | ICD-10-CM

## 2018-07-26 DIAGNOSIS — L70.9 ACNE, UNSPECIFIED ACNE TYPE: ICD-10-CM

## 2018-07-26 PROCEDURE — G8420 CALC BMI NORM PARAMETERS: HCPCS | Performed by: STUDENT IN AN ORGANIZED HEALTH CARE EDUCATION/TRAINING PROGRAM

## 2018-07-26 PROCEDURE — 4004F PT TOBACCO SCREEN RCVD TLK: CPT | Performed by: STUDENT IN AN ORGANIZED HEALTH CARE EDUCATION/TRAINING PROGRAM

## 2018-07-26 PROCEDURE — 99214 OFFICE O/P EST MOD 30 MIN: CPT | Performed by: STUDENT IN AN ORGANIZED HEALTH CARE EDUCATION/TRAINING PROGRAM

## 2018-07-26 PROCEDURE — 90715 TDAP VACCINE 7 YRS/> IM: CPT

## 2018-07-26 PROCEDURE — G0008 ADMIN INFLUENZA VIRUS VAC: HCPCS | Performed by: INTERNAL MEDICINE

## 2018-07-26 PROCEDURE — G8427 DOCREV CUR MEDS BY ELIG CLIN: HCPCS | Performed by: STUDENT IN AN ORGANIZED HEALTH CARE EDUCATION/TRAINING PROGRAM

## 2018-07-26 PROCEDURE — 99213 OFFICE O/P EST LOW 20 MIN: CPT | Performed by: INTERNAL MEDICINE

## 2018-07-26 RX ORDER — ERYTHROMYCIN AND BENZOYL PEROXIDE 30; 50 MG/G; MG/G
GEL TOPICAL
Qty: 23.3 G | Refills: 5 | Status: SHIPPED | OUTPATIENT
Start: 2018-07-26 | End: 2020-08-20

## 2018-07-26 RX ORDER — ALBUTEROL SULFATE 90 UG/1
AEROSOL, METERED RESPIRATORY (INHALATION)
Qty: 18 G | Refills: 5 | Status: SHIPPED | OUTPATIENT
Start: 2018-07-26 | End: 2019-06-06 | Stop reason: SDUPTHER

## 2018-07-26 ASSESSMENT — PATIENT HEALTH QUESTIONNAIRE - PHQ9
5. POOR APPETITE OR OVEREATING: 0
7. TROUBLE CONCENTRATING ON THINGS, SUCH AS READING THE NEWSPAPER OR WATCHING TELEVISION: 0
SUM OF ALL RESPONSES TO PHQ QUESTIONS 1-9: 0
9. THOUGHTS THAT YOU WOULD BE BETTER OFF DEAD, OR OF HURTING YOURSELF: 0
2. FEELING DOWN, DEPRESSED OR HOPELESS: 0
1. LITTLE INTEREST OR PLEASURE IN DOING THINGS: 0
4. FEELING TIRED OR HAVING LITTLE ENERGY: 0
3. TROUBLE FALLING OR STAYING ASLEEP: 0
10. IF YOU CHECKED OFF ANY PROBLEMS, HOW DIFFICULT HAVE THESE PROBLEMS MADE IT FOR YOU TO DO YOUR WORK, TAKE CARE OF THINGS AT HOME, OR GET ALONG WITH OTHER PEOPLE: 0
6. FEELING BAD ABOUT YOURSELF - OR THAT YOU ARE A FAILURE OR HAVE LET YOURSELF OR YOUR FAMILY DOWN: 0
SUM OF ALL RESPONSES TO PHQ9 QUESTIONS 1 & 2: 0
8. MOVING OR SPEAKING SO SLOWLY THAT OTHER PEOPLE COULD HAVE NOTICED. OR THE OPPOSITE, BEING SO FIGETY OR RESTLESS THAT YOU HAVE BEEN MOVING AROUND A LOT MORE THAN USUAL: 0

## 2018-07-30 ENCOUNTER — HOSPITAL ENCOUNTER (OUTPATIENT)
Age: 28
Setting detail: SPECIMEN
Discharge: HOME | End: 2018-07-30
Payer: MEDICARE

## 2018-07-30 ENCOUNTER — NURSE ONLY (OUTPATIENT)
Dept: INTERNAL MEDICINE | Age: 28
End: 2018-07-30
Payer: MEDICARE

## 2018-07-30 DIAGNOSIS — Z78.9 RUBELLA IMMUNE STATUS NOT KNOWN: ICD-10-CM

## 2018-07-30 DIAGNOSIS — Z23 NEED FOR HEPATITIS B VACCINATION: Primary | ICD-10-CM

## 2018-07-30 DIAGNOSIS — Z11.1 VISIT FOR MANTOUX TEST: ICD-10-CM

## 2018-07-30 LAB — RUBV IGG SER QL: 159.7 IU/ML

## 2018-07-30 PROCEDURE — 90746 HEPB VACCINE 3 DOSE ADULT IM: CPT

## 2018-07-30 PROCEDURE — 86787 VARICELLA-ZOSTER ANTIBODY: CPT

## 2018-07-30 PROCEDURE — 36415 COLL VENOUS BLD VENIPUNCTURE: CPT

## 2018-07-30 PROCEDURE — 86580 TB INTRADERMAL TEST: CPT

## 2018-07-30 PROCEDURE — 86765 RUBEOLA ANTIBODY: CPT

## 2018-07-30 PROCEDURE — 86762 RUBELLA ANTIBODY: CPT

## 2018-07-30 PROCEDURE — 96372 THER/PROPH/DIAG INJ SC/IM: CPT

## 2018-07-30 PROCEDURE — 86735 MUMPS ANTIBODY: CPT

## 2018-07-30 NOTE — PROGRESS NOTES
S: pt presents at clinic today for Hep b vaccination and first step PPD placement. Denies feeling ill , pt denies having any allergies or ever having a reaction to a vaccination. O: pt oriented times 4 , Skin warm dry. A:  Hep B vaccination administered IM into the left deltoid . PPD test placed right fore arm good wheel formed pt tolerated the procedure well . See additional medication documentation in epic Imm. P: shaquille form placed in next day basket ,pt return to clinic 8/1/18 @ 2:30 PPD reading.

## 2018-08-01 ENCOUNTER — NURSE ONLY (OUTPATIENT)
Dept: INTERNAL MEDICINE | Age: 28
End: 2018-08-01
Payer: MEDICARE

## 2018-08-01 DIAGNOSIS — Z11.1 ENCOUNTER FOR PPD SKIN TEST READING: Primary | ICD-10-CM

## 2018-08-01 LAB
INDURATION: NORMAL
MEASLES IMMUNE (IGG): 5.7
MUV IGG SER QL: 4.55
TB SKIN TEST: NORMAL
VZV IGG SER QL IA: 3.74

## 2018-08-01 PROCEDURE — 99211 OFF/OP EST MAY X REQ PHY/QHP: CPT | Performed by: INTERNAL MEDICINE

## 2018-08-01 PROCEDURE — 86580 TB INTRADERMAL TEST: CPT | Performed by: INTERNAL MEDICINE

## 2018-08-01 NOTE — PROGRESS NOTES
S: pt presents at clinic today for PPD reading right forearm    O: pt oriented times 4 , Skin warm and dry. A:   PPD test right ventral forearm assessed  0 mm induration. P:  Pt return to clinic 08/13/18 for 2nd step PPD placement.

## 2018-08-17 ENCOUNTER — TELEPHONE (OUTPATIENT)
Dept: INTERNAL MEDICINE | Age: 28
End: 2018-08-17

## 2018-08-17 ENCOUNTER — NURSE ONLY (OUTPATIENT)
Dept: INTERNAL MEDICINE | Age: 28
End: 2018-08-17
Payer: MEDICARE

## 2018-08-17 DIAGNOSIS — Z11.1 VISIT FOR MANTOUX TEST: Primary | ICD-10-CM

## 2018-08-17 PROCEDURE — 86580 TB INTRADERMAL TEST: CPT | Performed by: INTERNAL MEDICINE

## 2018-08-17 PROCEDURE — 99213 OFFICE O/P EST LOW 20 MIN: CPT | Performed by: INTERNAL MEDICINE

## 2018-08-17 NOTE — TELEPHONE ENCOUNTER
Patient here for PPD placement. Came to exam room tearful. Requesting to see her doctor for depression. Thinks she may have postpartum depression. States her son is 3year old. Denies thoughts to hurt self, just would like to speak with someone. Patient interested in seeing Dr. Freeman Suero. Scheduled 08/23/18 @ 11:30.

## 2018-08-20 ENCOUNTER — NURSE ONLY (OUTPATIENT)
Dept: INTERNAL MEDICINE | Age: 28
End: 2018-08-20
Payer: MEDICARE

## 2018-08-20 DIAGNOSIS — Z11.1 ENCOUNTER FOR PPD SKIN TEST READING: Primary | ICD-10-CM

## 2018-08-20 LAB
INDURATION: NORMAL
TB SKIN TEST: NORMAL

## 2018-08-20 PROCEDURE — 86580 TB INTRADERMAL TEST: CPT | Performed by: INTERNAL MEDICINE

## 2018-08-20 PROCEDURE — 99211 OFF/OP EST MAY X REQ PHY/QHP: CPT | Performed by: INTERNAL MEDICINE

## 2018-08-20 NOTE — PROGRESS NOTES
//S: pt presents at clinic today for PPD reading LFA     O: pt oriented times 4 , Skin warm and dry. A:   PPD test right left ventral forearm assessed  0 mm induration. P: form filled out and scanned to media , copy given to pt. / pt return to clinic 09/04/18 for HEP B #2.

## 2018-08-23 ENCOUNTER — OFFICE VISIT (OUTPATIENT)
Dept: BEHAVIORAL/MENTAL HEALTH CLINIC | Age: 28
End: 2018-08-23
Payer: MEDICARE

## 2018-08-23 DIAGNOSIS — F32.A DEPRESSION, UNSPECIFIED DEPRESSION TYPE: Primary | ICD-10-CM

## 2018-08-23 PROCEDURE — 90791 PSYCH DIAGNOSTIC EVALUATION: CPT | Performed by: PSYCHOLOGIST

## 2018-08-23 NOTE — PROGRESS NOTES
visit / overall health      Pt Behavioral Change Plan:   1. Please complete Patient Health Questionnaire and bring this with you to your next consultation. 2. If you need to reschedule or cancel please call [phone], option 3. If you need to speak directly to Dr. Tony Foster call [phone]. 3. See handout on depression- is this a good description of what you struggle with? If not what is different for you? 4. Follow up 2 weeks    Diagnosis:  The encounter diagnosis was Depression, unspecified depression type. Diagnosis Date    Asthma     H/O chlamydia infection     10/2014 - treated, 4/2015 LARISSA neg    Marijuana smoker 3/28/2017    Postpartum depression     no meds       History:    Medications:   Current Outpatient Prescriptions   Medication Sig Dispense Refill    benzoyl peroxide-erythromycin (BENZAMYCIN) 5-3 % gel apply to affected area twice a day 23.3 g 5    mometasone-formoterol (DULERA) 100-5 MCG/ACT inhaler inhale 2 puffs by mouth every 12 hours 13 g 5    albuterol sulfate HFA (VENTOLIN HFA) 108 (90 Base) MCG/ACT inhaler inhale 2 puffs by mouth every 4 hours if needed 18 g 5    ammonium lactate (LAC-HYDRIN) 12 % cream Apply topically as needed. 1 Bottle 4    medroxyPROGESTERone (DEPO-PROVERA) 150 MG/ML injection Inject 1 mL into the muscle every 3 months 1 mL 3    triamcinolone (KENALOG) 0.025 % ointment apply topically twice a day 45 g 2    Prenatal Vit-Fe Fumarate-FA (PRENATAL VITAMIN) 27-0.8 MG TABS Take 1 tablet by mouth daily 90 tablet 1     No current facility-administered medications for this visit. Social History:   Social History     Social History    Marital status: Single     Spouse name: N/A    Number of children: N/A    Years of education: N/A     Occupational History    Not on file. Social History Main Topics    Smoking status: Current Every Day Smoker     Years: 13.00     Types: Cigarettes    Smokeless tobacco: Never Used      Comment: \"5-8 cigs per day.  \"

## 2018-08-23 NOTE — PATIENT INSTRUCTIONS
1. Please complete Patient Health Questionnaire and bring this with you to your next consultation. 2. If you need to reschedule or cancel please call [phone], option 3. If you need to speak directly to Dr. Charles Zavala call [phone]. 3. See handout on depression- is this a good description of what you struggle with? If not what is different for you? 4. Follow up 2 weeks    OVERCOMING DEPRESSION    This handout is designed to provide information about strategies for overcoming depression. It discusses a model or framework for understanding depression (the depression spiral) and presents an overview of treatment of depression, including use of medication and strategic coping strategies. The booklet is designed to be used with the assistance of your Behavioral Health Consultant. The Depression Spiral    The figure below depicts one helpful way to think about and understand depression. Our life experience (including depression) is influenced by a number of interrelated factors: our environment, biological factors, our thoughts and beliefs, our behaviors, and our emotions. Each factor can affect the others. For example, Molly Davis recently began working in a fast-paced, high-pressure job (environmental factor). She began to have thoughts such as Theres no way I can get all this work done. Its impossible. If I dont get it done, I may lose my job.   As a result, she began to work longer hours, cut out all fun activities, and withdraw from family and friends (behaviors). With this decrease in many of the positive, rewarding aspects of her life, she began to feel down, depressed, and more irritable (emotions). As the depression cycle started to take hold, she had more difficulty sleeping and concentrating (biology), which led to even more irritability and depression (emotions) and further withdrawal from activities and people (behaviors).   At some point in the cycle, the balance of chemicals in her brain etc.). As you can see, there are a variety of coping methods and behavioral strategies that may be helpful in decreasing depression. It is probably not in your best interests to try all or too many strategies at any one time. Rather, keep it simple and do not overwhelm yourself. It is usually best to pick one or two strategies that sound most relevant to you, try those coping strategies for a few weeks or longer, and then move on to other coping strategies that you think may be important later on. And remember -- even if you are just working directly on one or two coping strategies, you will probably be having an indirect positive effect on other areas. Your Behavioral Health Consultant [facility]) can work with you to develop skills in some of the most effective strategies for breaking the depression spiral and decreasing depression. Four effective strategies include:    _____  a. Increasing rewarding activities    _____  b. Taking antidepressant medication as directed by PCP     _____  c. Increasing physical exercise     _____  d. Increasing balanced thinking     Talk with your [employer] [facility] about which of the above you are interested in working on to better manage your depression. Depression: Facts, Myths & Tips for Feeling Better    Facts    Depression is very common  Nearly 20% of the [place] population experiences a significant depression during their lifetime. Depression is treatable  Because depression affects so many, and can have such a powerful and negative impact on life, there has been an enormous amount of research conducted on how to reduce symptoms and improve functioning. As a result, we now know there are behaviors YOU can engage in to make yourself feel better.     Depression is not a weakness  People suffer from depression for a variety of reasons, biological, environmental and behavioral. Research indicates that [employer] is NOT one of the reasons people become depressed. Depression is not something you are powerless against  Evidence suggests that you can directly impact the intensity and duration of depression by what you do and by altering the way you think about certain things. The Downward Spiral  Depression often begins as a drop in mood due to an environmental or biological trigger that makes people feel less like being active. Being less active, in turn, often causes people to experience an even lower mood and feel even less like being active, and so the cycle begins. y  ou begin to completely isolate and cease all pleasurable activities OR you reverse   How can I start feeling better? The first and best way to reverse the downward cycle is to get active! Your body produces its own anti-depressants every time you exercise or do something pleasurable. Regular exercise is one of the very best ways to improve your mood. In fact some studies have shown that a solid exercise program is as effective as psychotherapy or anti-depressant medication for some people. *See physical activity section of handout    Force yourself to do something you found pleasurable before depression. This may be different for everyone and it doesnt matter if its gardening, playing bridge, walking, reading a novel, or simply talking to a close friend. What matters is that YOU find the activity pleasurable! Even if you dont feel like doing something pleasurable for yourself, DO IT ANYWAY. We call this the fake it until you make it principle. Educate yourself! Often people feel powerless against medical conditions because they do not understand what is happening in their body. Just by reading this handout you know more than most people about depression. Knowledge is power when you can apply it, and make yourself feel better. *See recommended reading list at the bottom of this handout    Begin to notice unhealthy and unhelpful thoughts!  In addition to how we behave, how we think

## 2018-08-31 DIAGNOSIS — Z11.3 SCREEN FOR STD (SEXUALLY TRANSMITTED DISEASE): ICD-10-CM

## 2018-08-31 NOTE — TELEPHONE ENCOUNTER
ramses request for Prenatal Vit-Fe Fumarate-FA (PNV PRENATAL PLUS MULTIVITAMIN) 27-1 MG TABS,  future appt scheduled    Next Visit Date:  Future Appointments  Date Time Provider Jared Nazia   9/4/2018 11:00 AM SCHEDULE, [facility] IM NURSE [facility] IM [facility]   9/6/2018 11:30 AM Jean Marie Robles PSYD Psych [facility]   9/25/2018 10:15 AM SCHEDULE, [facility] OB/GYN [facility] OB/Gyn [facility]   10/1/2018 10:30 AM Jennie Lopes DPM [facility]   5/8/2019 1:30 PM Colton Chirinos DO [facility] OB/Gyn [address] Maintenance   Topic Date Due    Pneumococcal med risk (1 of 1 - PPSV23) 09/19/2009    Flu vaccine (1) 09/01/2018    Cervical cancer screen  05/08/2021    DTaP/Tdap/Td vaccine (2 - Td) 07/26/2028    HIV screen  Completed             (applicable per patient's age: Cancer Screenings, Depression Screening, Fall Risk Screening, Immunizations)    LDL Cholesterol (mg/dL)   Date Value   10/15/2014 81     AST (U/L)   Date Value   02/08/2017 19     ALT (U/L)   Date Value   02/08/2017 16     BUN (mg/dL)   Date Value   02/08/2017 11      (goal A1C is < 7)   (goal LDL is <100) need 30-50% reduction from baseline     BP Readings from Last 3 Encounters:   07/26/18 135/84   07/03/18 109/75   05/08/18 114/71    (goal /80)      All Future Testing planned in [facility]:  Lab Frequency Next Occurrence   PAP Smear Once 06/16/2018                Patient Active Problem List:     Asthma     Tobacco use     Marijuana smoker     Rh+/ R Unk/ Gbs unk     H/O Postpartum depression     Vaginal bleeding in pregnancy     Single umbilical artery     Fetal cardiac echogenic focus, antepartum     Tobacco use disorder complicating pregnancy, childbirth, or puerperium, antepartum     Low weight gain in pregnancy     Asthma affecting pregnancy, antepartum     Cardiac echogenic foci & Bicuspid aortic valves     Nonreactive NST      31 weeks gestation of pregnancy     Noncompliant pregnant patient in third trimester

## 2018-09-16 RX ORDER — VITAMIN A ACETATE, .BETA.-CAROTENE, ASCORBIC ACID, CHOLECALCIFEROL, .ALPHA.-TOCOPHEROL ACETATE, DL-, THIAMINE MONONITRATE, RIBOFLAVIN, NIACINAMIDE, PYRIDOXINE HYDROCHLORIDE, FOLIC ACID, CYANOCOBALAMIN, CALCIUM CARBONATE, FERROUS FUMARATE, ZINC OXIDE, AND CUPRIC OXIDE 2000; 2000; 120; 400; 22; 1.84; 3; 20; 10; 1; 12; 200; 27; 25; 2 [IU]/1; [IU]/1; MG/1; [IU]/1; MG/1; MG/1; MG/1; MG/1; MG/1; MG/1; UG/1; MG/1; MG/1; MG/1; MG/1
TABLET ORAL
Qty: 90 TABLET | Refills: 0 | Status: SHIPPED | OUTPATIENT
Start: 2018-09-16 | End: 2019-06-06 | Stop reason: ALTCHOICE

## 2018-11-09 ENCOUNTER — OFFICE VISIT (OUTPATIENT)
Dept: OBGYN | Age: 28
End: 2018-11-09
Payer: MEDICARE

## 2018-11-09 VITALS
SYSTOLIC BLOOD PRESSURE: 109 MMHG | RESPIRATION RATE: 16 BRPM | WEIGHT: 107.8 LBS | HEIGHT: 64 IN | HEART RATE: 82 BPM | DIASTOLIC BLOOD PRESSURE: 72 MMHG | TEMPERATURE: 97.5 F | BODY MASS INDEX: 18.4 KG/M2

## 2018-11-09 DIAGNOSIS — Z30.09 FAMILY PLANNING: Primary | ICD-10-CM

## 2018-11-09 LAB
CONTROL: PRESENT
PREGNANCY TEST URINE, POC: NEGATIVE

## 2018-11-09 PROCEDURE — 99213 OFFICE O/P EST LOW 20 MIN: CPT | Performed by: OBSTETRICS & GYNECOLOGY

## 2018-11-09 PROCEDURE — G8420 CALC BMI NORM PARAMETERS: HCPCS | Performed by: OBSTETRICS & GYNECOLOGY

## 2018-11-09 PROCEDURE — 99212 OFFICE O/P EST SF 10 MIN: CPT | Performed by: OBSTETRICS & GYNECOLOGY

## 2018-11-09 PROCEDURE — G8484 FLU IMMUNIZE NO ADMIN: HCPCS | Performed by: OBSTETRICS & GYNECOLOGY

## 2018-11-09 PROCEDURE — 4004F PT TOBACCO SCREEN RCVD TLK: CPT | Performed by: OBSTETRICS & GYNECOLOGY

## 2018-11-09 PROCEDURE — G8427 DOCREV CUR MEDS BY ELIG CLIN: HCPCS | Performed by: OBSTETRICS & GYNECOLOGY

## 2018-11-09 PROCEDURE — 81025 URINE PREGNANCY TEST: CPT | Performed by: OBSTETRICS & GYNECOLOGY

## 2018-11-09 RX ORDER — MEDROXYPROGESTERONE ACETATE 150 MG/ML
150 INJECTION, SUSPENSION INTRAMUSCULAR ONCE
Qty: 1 ML | Refills: 3 | Status: SHIPPED | OUTPATIENT
Start: 2018-11-09 | End: 2020-02-06

## 2018-11-28 ENCOUNTER — NURSE ONLY (OUTPATIENT)
Dept: OBGYN | Age: 28
End: 2018-11-28
Payer: MEDICARE

## 2018-11-28 VITALS — WEIGHT: 107 LBS | BODY MASS INDEX: 18.37 KG/M2

## 2018-11-28 DIAGNOSIS — Z30.09 FAMILY PLANNING: Primary | ICD-10-CM

## 2018-11-28 PROCEDURE — 99211 OFF/OP EST MAY X REQ PHY/QHP: CPT

## 2018-11-28 PROCEDURE — 96372 THER/PROPH/DIAG INJ SC/IM: CPT

## 2018-11-28 RX ORDER — MEDROXYPROGESTERONE ACETATE 150 MG/ML
150 INJECTION, SUSPENSION INTRAMUSCULAR ONCE
Status: COMPLETED | OUTPATIENT
Start: 2018-11-28 | End: 2018-11-28

## 2018-11-28 RX ADMIN — MEDROXYPROGESTERONE ACETATE 150 MG: 150 INJECTION, SUSPENSION, EXTENDED RELEASE INTRAMUSCULAR at 15:09

## 2018-11-28 NOTE — PROGRESS NOTES
Patient presents to office for Depo Provera injection. Per doctor's orders of Depo Provera 150mg given IM, Right Gluteal , patient tolerated well. Patient advised to return in 11-12 weeks for next injection.

## 2019-02-13 ENCOUNTER — TELEPHONE (OUTPATIENT)
Dept: OBGYN | Age: 29
End: 2019-02-13

## 2019-02-26 ENCOUNTER — TELEPHONE (OUTPATIENT)
Dept: OBGYN | Age: 29
End: 2019-02-26

## 2019-04-05 ENCOUNTER — OFFICE VISIT (OUTPATIENT)
Dept: BEHAVIORAL/MENTAL HEALTH CLINIC | Age: 29
End: 2019-04-05
Payer: MEDICARE

## 2019-04-05 DIAGNOSIS — F32.A DEPRESSION, UNSPECIFIED DEPRESSION TYPE: Primary | ICD-10-CM

## 2019-04-05 PROCEDURE — 90791 PSYCH DIAGNOSTIC EVALUATION: CPT | Performed by: PSYCHOLOGIST

## 2019-04-05 NOTE — PROGRESS NOTES
Behavioral Health Consultation  Corby PAGAN  Licensed Clinical Psychologist #[id number]  4/5/2019  9:56 AM- 10:32 AM    Time spent with Patient: 36 minutes  This is patient's first  [facility] consultation, 2nd episode of care. Reason for Consult:  depression  Referring Provider: Rene cAkerman MD    Pt provided informed consent for the behavioral health program. Discussed with patient model of service to include the limits of confidentiality (i.e. abuse reporting, suicide intervention, etc.) and short-term intervention focused approach. Pt indicated understanding. Feedback given to PCP. S:   Pt attended one consult on 8/23/18 and failed to return for follow up. Hospitalized at [facility] on 9/7/18 for intentional Tylenol overdose. Pt states that she has an appointment for an apartment at the end of April. Living with father; essentially homeless. Describes being with her  for 11 years  for 2 years with two sons. He works on and off, both living with dad. Mood has not improved since her previous consultation. O:  MSE:   Mood was Depressed, with Congruent affect. Suicidal ideation was denied. Homicidal ideation was denied. Hygiene was Good. Dress was Appropriate and Casual.   Behavior was WNL & unremarkable with No observation or self-report of difficulties standing/ambulating. Attitude was Help-seeking. Eye-contact was Good. Speech: rate- WNL, rhythm-  WNL, volume- WNL  Verbalizations were  verbose. Thought processes were intact and goal-oriented without evidence of delusions, hallucinations, obsessions, or medardo; with significant cognitive distortions. Associations were characterized by tangential cognitive processes. Pt was orientated oriented to person, place, time, and general circumstances;  recent:  good and fair and remote:  good and fair.   Insight and judgment were estimated to be fair to poor, AEB, a poor understanding of cyclical maladaptive patterns, and the ability to use insight to inform behavior change. A:   Introduced pt to [facility] model of care and began to clarify Her treatment needs, including distress and symptoms of depressed mood. Pt interventions: Motivational Interviewing to determine importance and readiness for change, Discussed potential barriers to change, Established rapport and [place]-setting to identify pt's primary goals for [facility] visit / overall health      Pt Behavioral Change Plan:   1. Please complete Patient Health Questionnaire and bring this with you to your next consultation. 2. If you need to reschedule or cancel please call [phone], option 3. If you need to speak directly to Dr. Lin Kothari call [phone]. 3. See handout below about depression- does this feel like a good description of what you struggle with? 4. Follow up in 1 week. Diagnosis:  The encounter diagnosis was Depression, unspecified depression type. Diagnosis Date    Asthma     H/O chlamydia infection     10/2014 - treated, 4/2015 LARISSA neg    Marijuana smoker 3/28/2017    Postpartum depression     no meds       History:    Social History:   Social History     Socioeconomic History    Marital status: Single     Spouse name: Not on file    Number of children: Not on file    Years of education: Not on file    Highest education level: Not on file   Occupational History    Not on file   Social Needs    Financial resource strain: Not on file    Food insecurity:     Worry: Not on file     Inability: Not on file    Transportation needs:     Medical: Not on file     Non-medical: Not on file   Tobacco Use    Smoking status: Current Every Day Smoker     Years: 13.00     Types: Cigarettes    Smokeless tobacco: Never Used    Tobacco comment: \"5-8 cigs per day. \"  8/24/2017   Substance and Sexual Activity    Alcohol use: Yes     Comment: social.   Pt has some alcohol in prior to knowledge of preg.      Drug use: No     Frequency: 2.0 times per week     Types: Marijuana Comment: last used week of march 13    Sexual activity: Yes     Partners: Male   Lifestyle    Physical activity:     Days per week: Not on file     Minutes per session: Not on file    Stress: Not on file   Relationships    Social connections:     Talks on phone: Not on file     Gets together: Not on file     Attends Faith service: Not on file     Active member of club or organization: Not on file     Attends meetings of clubs or organizations: Not on file     Relationship status: Not on file    Intimate partner violence:     Fear of current or ex partner: Not on file     Emotionally abused: Not on file     Physically abused: Not on file     Forced sexual activity: Not on file   Other Topics Concern    Not on file   Social History Narrative    Currently lives with her son and his father. Feels safe at home. Does not work. Started college but did not finish. TOBACCO:   reports that she has been smoking cigarettes. She has smoked for the past 13.00 years. She has never used smokeless tobacco.  ETOH:   reports that she drinks alcohol.     Family History:   Family History   Problem Relation Age of Onset    Other Mother         Chrons    Hypertension Father     No Known Problems Sister         3 sisters with asthma    Other Brother         2 brothers in good health    No Known Problems Maternal Grandmother     No Known Problems Maternal Grandfather     No Known Problems Paternal Grandmother     No Known Problems Paternal Grandfather

## 2019-04-05 NOTE — PATIENT INSTRUCTIONS
began to alter (biology), which further deepened the spiral of depression. BREAKING THE DEPRESSION SPIRAL    As you can see, a variety of factors, including thoughts, behaviors, emotions, and environmental and biological factors can cause, maintain, and worsen depression. Fortunately, there are effective ways of breaking the spiral of depression. Since all the factors are interrelated (with one aspect affecting the others), even making small changes in just one or two areas can gradually lead to significant improvements in other areas. For example, Yudy Salguero noticed her worsening moods and decided to take action to break the depression spiral.  She focused first on one area that she felt would be easy to change:  her behaviors. Specifically, she wanted to make changes in how she spent her time in the evenings after work. She made a goal of spending 30 minutes each evening relaxing and doing fun activities with her family (behaviors). After several weeks, she noticed that she was beginning to feel lower levels of stress and her sleep began to improve (biological factor). Feeling more rested and better able to concentrate (biological factors) increased her belief that she could effectively manage the demands of her new job (thoughts). She noticed that she had fewer days of feeling down and depressed (emotions). Other people working to improve their depression may choose to focus initially on other areas. Some people benefit from starting an antidepressant medication (biological factor) to begin to break the depression cycle.   Others focus first on increasing regular physical exercise (a behavioral and biological factor that may help decrease depression), recognizing and changing thought patterns that contribute to depression or worry (thoughts), or learning and trying out new behaviors to improve work or family situations (behaviors, e.g., problem-solving, effective communication, time management, etc.). As you can see, there are a variety of coping methods and behavioral strategies that may be helpful in decreasing depression. It is probably not in your best interests to try all or too many strategies at any one time. Rather, keep it simple and do not overwhelm yourself. It is usually best to pick one or two strategies that sound most relevant to you, try those coping strategies for a few weeks or longer, and then move on to other coping strategies that you think may be important later on. And remember -- even if you are just working directly on one or two coping strategies, you will probably be having an indirect positive effect on other areas. Your Behavioral Health Consultant [facility]) can work with you to develop skills in some of the most effective strategies for breaking the depression spiral and decreasing depression. Four effective strategies include:    _____  a. Increasing rewarding activities    _____  b. Taking antidepressant medication as directed by PCP     _____  c. Increasing physical exercise     _____  d. Increasing balanced thinking     Talk with your [facility] about which of the above you are interested in working on to better manage your depression.

## 2019-04-09 ENCOUNTER — TELEPHONE (OUTPATIENT)
Dept: BEHAVIORAL/MENTAL HEALTH CLINIC | Age: 29
End: 2019-04-09

## 2019-04-25 ENCOUNTER — TELEPHONE (OUTPATIENT)
Dept: BEHAVIORAL/MENTAL HEALTH CLINIC | Age: 29
End: 2019-04-25

## 2019-04-25 NOTE — TELEPHONE ENCOUNTER
Completed outreach to discuss cancelling all future appointments with this writer and her OB/GYN. Previous engagement was brief and resulted in suicide attempt. Expressed concern that previous engagement resulted in a suicide attempt and this writer is unsure how to interpret her behavior and is concerned. 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 She expressed gratitude for this and asked that this continue. However, she then made multiple excuses and justifications around rescheduling for next week or the following week. Shared with her that this writer will be out of the office from 5/13-5/20. She asked that this writer call her when returning to the office. She was informed that it is her responsibility to call the office to schedule when she is willing and able to participate in care (i.e., that reaching out to her is different than calling her to schedule after today's discussion and giving her many options to schedule).

## 2019-06-06 ENCOUNTER — OFFICE VISIT (OUTPATIENT)
Dept: INTERNAL MEDICINE | Age: 29
End: 2019-06-06
Payer: MEDICARE

## 2019-06-06 ENCOUNTER — HOSPITAL ENCOUNTER (OUTPATIENT)
Age: 29
Setting detail: SPECIMEN
Discharge: HOME | End: 2019-06-06
Payer: MEDICARE

## 2019-06-06 VITALS
DIASTOLIC BLOOD PRESSURE: 73 MMHG | WEIGHT: 100 LBS | BODY MASS INDEX: 17.07 KG/M2 | HEART RATE: 59 BPM | SYSTOLIC BLOOD PRESSURE: 116 MMHG | HEIGHT: 64 IN

## 2019-06-06 DIAGNOSIS — Z72.0 TOBACCO USE: ICD-10-CM

## 2019-06-06 DIAGNOSIS — J45.40 MODERATE PERSISTENT ASTHMA WITHOUT COMPLICATION: ICD-10-CM

## 2019-06-06 DIAGNOSIS — R63.4 WEIGHT LOSS: ICD-10-CM

## 2019-06-06 DIAGNOSIS — Z11.3 SCREEN FOR STD (SEXUALLY TRANSMITTED DISEASE): Primary | ICD-10-CM

## 2019-06-06 LAB
ABSOLUTE EOS #: 0.34 K/UL (ref 0–0.44)
ABSOLUTE IMMATURE GRANULOCYTE: <0.03 K/UL (ref 0–0.3)
ABSOLUTE LYMPH #: 3.09 K/UL (ref 1.1–3.7)
ABSOLUTE MONO #: 0.56 K/UL (ref 0.1–1.2)
ALBUMIN SERPL-MCNC: 4.4 G/DL (ref 3.5–5.2)
ALBUMIN/GLOBULIN RATIO: 1.3 (ref 1–2.5)
ALP BLD-CCNC: 48 U/L (ref 35–104)
ALT SERPL-CCNC: 9 U/L (ref 5–33)
ANION GAP SERPL CALCULATED.3IONS-SCNC: 16 MMOL/L (ref 9–17)
AST SERPL-CCNC: 13 U/L
BASOPHILS # BLD: 1 % (ref 0–2)
BASOPHILS ABSOLUTE: 0.09 K/UL (ref 0–0.2)
BILIRUB SERPL-MCNC: 0.36 MG/DL (ref 0.3–1.2)
BUN BLDV-MCNC: 8 MG/DL (ref 6–20)
BUN/CREAT BLD: ABNORMAL (ref 9–20)
CALCIUM SERPL-MCNC: 9.8 MG/DL (ref 8.6–10.4)
CHLORIDE BLD-SCNC: 106 MMOL/L (ref 98–107)
CO2: 20 MMOL/L (ref 20–31)
CREAT SERPL-MCNC: 0.81 MG/DL (ref 0.5–0.9)
DIFFERENTIAL TYPE: ABNORMAL
EOSINOPHILS RELATIVE PERCENT: 5 % (ref 1–4)
GFR AFRICAN AMERICAN: >60 ML/MIN
GFR NON-AFRICAN AMERICAN: >60 ML/MIN
GFR SERPL CREATININE-BSD FRML MDRD: ABNORMAL ML/MIN/{1.73_M2}
GFR SERPL CREATININE-BSD FRML MDRD: ABNORMAL ML/MIN/{1.73_M2}
GLUCOSE BLD-MCNC: 65 MG/DL (ref 70–99)
HCT VFR BLD CALC: 49.7 % (ref 36.3–47.1)
HEMOGLOBIN: 15.1 G/DL (ref 11.9–15.1)
HIV AG/AB: NONREACTIVE
IMMATURE GRANULOCYTES: 0 %
LYMPHOCYTES # BLD: 49 % (ref 24–43)
MCH RBC QN AUTO: 29 PG (ref 25.2–33.5)
MCHC RBC AUTO-ENTMCNC: 30.4 G/DL (ref 28.4–34.8)
MCV RBC AUTO: 95.4 FL (ref 82.6–102.9)
MONOCYTES # BLD: 9 % (ref 3–12)
NRBC AUTOMATED: 0.5 PER 100 WBC
PDW BLD-RTO: 12.4 % (ref 11.8–14.4)
PLATELET # BLD: ABNORMAL K/UL (ref 138–453)
PLATELET ESTIMATE: ABNORMAL
PLATELET, FLUORESCENCE: 282 K/UL (ref 138–453)
PLATELET, IMMATURE FRACTION: 3.3 % (ref 1.1–10.3)
PMV BLD AUTO: ABNORMAL FL (ref 8.1–13.5)
POTASSIUM SERPL-SCNC: 4.1 MMOL/L (ref 3.7–5.3)
RBC # BLD: 5.21 M/UL (ref 3.95–5.11)
RBC # BLD: ABNORMAL 10*6/UL
SEG NEUTROPHILS: 35 % (ref 36–65)
SEGMENTED NEUTROPHILS ABSOLUTE COUNT: 2.16 K/UL (ref 1.5–8.1)
SODIUM BLD-SCNC: 142 MMOL/L (ref 135–144)
TOTAL PROTEIN: 7.8 G/DL (ref 6.4–8.3)
TSH SERPL DL<=0.05 MIU/L-ACNC: 0.75 MIU/L (ref 0.3–5)
VITAMIN D 25-HYDROXY: 19.6 NG/ML (ref 30–100)
WBC # BLD: 6.3 K/UL (ref 3.5–11.3)
WBC # BLD: ABNORMAL 10*3/UL

## 2019-06-06 PROCEDURE — 84443 ASSAY THYROID STIM HORMONE: CPT

## 2019-06-06 PROCEDURE — G8419 CALC BMI OUT NRM PARAM NOF/U: HCPCS | Performed by: INTERNAL MEDICINE

## 2019-06-06 PROCEDURE — 85025 COMPLETE CBC W/AUTO DIFF WBC: CPT

## 2019-06-06 PROCEDURE — G8427 DOCREV CUR MEDS BY ELIG CLIN: HCPCS | Performed by: INTERNAL MEDICINE

## 2019-06-06 PROCEDURE — 99211 OFF/OP EST MAY X REQ PHY/QHP: CPT | Performed by: INTERNAL MEDICINE

## 2019-06-06 PROCEDURE — 82306 VITAMIN D 25 HYDROXY: CPT

## 2019-06-06 PROCEDURE — 87389 HIV-1 AG W/HIV-1&-2 AB AG IA: CPT

## 2019-06-06 PROCEDURE — 99213 OFFICE O/P EST LOW 20 MIN: CPT | Performed by: INTERNAL MEDICINE

## 2019-06-06 PROCEDURE — 85055 RETICULATED PLATELET ASSAY: CPT

## 2019-06-06 PROCEDURE — 36415 COLL VENOUS BLD VENIPUNCTURE: CPT

## 2019-06-06 PROCEDURE — 80053 COMPREHEN METABOLIC PANEL: CPT

## 2019-06-06 PROCEDURE — 86038 ANTINUCLEAR ANTIBODIES: CPT

## 2019-06-06 PROCEDURE — 4004F PT TOBACCO SCREEN RCVD TLK: CPT | Performed by: INTERNAL MEDICINE

## 2019-06-06 RX ORDER — ALBUTEROL SULFATE 90 UG/1
AEROSOL, METERED RESPIRATORY (INHALATION)
Qty: 18 G | Refills: 5 | Status: SHIPPED | OUTPATIENT
Start: 2019-06-06 | End: 2019-09-29 | Stop reason: SDUPTHER

## 2019-06-06 ASSESSMENT — ENCOUNTER SYMPTOMS
RESPIRATORY NEGATIVE: 1
GASTROINTESTINAL NEGATIVE: 1
ALLERGIC/IMMUNOLOGIC NEGATIVE: 1
EYE DISCHARGE: 1

## 2019-06-06 NOTE — PROGRESS NOTES
[address]   Progress Note      Visit Information    Have you changed or started any medications since your last visit including any over-the-counter medicines, vitamins, or herbal medicines? no   Are you having any side effects from any of your medications? -  no  Have you stopped taking any of your medications? Is so, why? -  no    Have you seen any other physician or provider since your last visit? Yes - Records Obtained  Have you had any other diagnostic tests since your last visit? Yes - Records Obtained  Have you been seen in the emergency room and/or had an admission to a hospital since we last saw you? Yes - Records Obtained  Have you had your routine dental cleaning in the past 6 months? no    Have you activated your [employer] account? If not, what are your barriers? No: Pt declined     Patient Care Team:  Rene Ackerman MD as PCP - General (Internal Medicine)  Debby Márquez DO as Resident (Obstetrics & Gynecology)  Maggie Gray MD as Obstetrician (Perinatology)  Silver Patches, DPM as Physician (Podiatry)  Silver Patches, DPM as Physician (Podiatry)    Medical History Review  Past Medical, Family, and Social History reviewed and does contribute to the patient presenting condition    Health Maintenance   Topic Date Due    Pneumococcal 0-64 years Vaccine (1 of 1 - PPSV23) 09/19/1996    Varicella Vaccine (1 of 2 - 13+ 2-dose series) 06/06/2020 (Originally 9/19/2003)    Flu vaccine (Season Ended) 09/01/2019    Cervical cancer screen  05/08/2021    DTaP/Tdap/Td vaccine (2 - Td) 07/26/2028    HIV screen  Completed       Date of patient's visit: 6/6/2019  Patient's Name:  Wilfred Younger                   YOB: 1990        PCP:  Rene Ackerman MD    Wilfred Younger is a 29 y.o. female who presents for   Chief Complaint   Patient presents with    Weight Loss     Pt states has lost 10 lbs in the past couple of weeks    and follow up of chronic medical problems.   Patient Active Problem List   Diagnosis    Asthma    Tobacco use    Marijuana smoker    Rh+/ R Unk/ Gbs unk    H/O Postpartum depression    Vaginal bleeding in pregnancy    Single umbilical artery    Fetal cardiac echogenic focus, antepartum    Tobacco use disorder complicating pregnancy, childbirth, or puerperium, antepartum    Low weight gain in pregnancy    Asthma affecting pregnancy, antepartum    Cardiac echogenic foci & Bicuspid aortic valves    Nonreactive NST     31 weeks gestation of pregnancy    Noncompliant pregnant patient in third trimester       HISTORY OF PRESENT ILLNESS:    History was obtained from the patient. Subjective:      Luis Eduardo Whalen is a 29 y.o. female here for discussion regarding unexplained/excessive weight loss. Onset was several weeks ago. Patient has lost approximately 10 pounds in last 2 months. History of eating disorders: none. Patient is not exercising. Factors associated with weight loss: homeless and new job but not consistent in eating. Patient denies: abdominal pain, constipation and poor appetite. Patient's allergies, medications, past medical, surgical, social and family histories were reviewed and updated as appropriate. ALLERGIES    No Known Allergies      MEDICATIONS:      Current Outpatient Medications   Medication Sig Dispense Refill    medroxyPROGESTERone (DEPO-PROVERA) 150 MG/ML injection Inject 1 mL into the muscle once for 1 dose Please Give IM Injection every 12 weeks. A negative POCT UCG must be completed before initial injection or if the patient is outside her window.  1 mL 3    Prenatal Vit-Fe Fumarate-FA (PNV PRENATAL PLUS MULTIVITAMIN) 27-1 MG TABS take 1 tablet by mouth once daily 90 tablet 0    benzoyl peroxide-erythromycin (BENZAMYCIN) 5-3 % gel apply to affected area twice a day 23.3 g 5    mometasone-formoterol (DULERA) 100-5 MCG/ACT inhaler inhale 2 puffs by mouth every 12 hours 13 g 5    albuterol sulfate HFA (VENTOLIN HFA) 108 (90 Base) MCG/ACT inhaler inhale 2 puffs by mouth every 4 hours if needed 18 g 5    ammonium lactate (LAC-HYDRIN) 12 % cream Apply topically as needed. 1 Bottle 4    medroxyPROGESTERone (DEPO-PROVERA) 150 MG/ML injection Inject 1 mL into the muscle every 3 months 1 mL 3    triamcinolone (KENALOG) 0.025 % ointment apply topically twice a day 45 g 2     No current facility-administered medications for this visit. Patient Care Team:  Chivo Oliver MD as PCP - General (Internal Medicine)  Margaret Frerell DO as Resident (Obstetrics & Gynecology)  Cintia Winston MD as Obstetrician (Perinatology)  Adeline Bosworth, DPM as Physician (Podiatry)  Adeline Bosworth, DPM as Physician (Podiatry)    PAST MEDICAL AND SURGICAL HISTORY:      Past Medical History:   Diagnosis Date    Asthma     H/O chlamydia infection     10/2014 - treated, 4/2015 LARISSA neg    Marijuana smoker 3/28/2017    Postpartum depression     no meds     Past Surgical History:   Procedure Laterality Date    OSTEOTOMY Bilateral 12/10/2015    1) Decompression metatarsal osteotomy right 2nd. 2) Decompression metatarsal osteotomy left 2nd. SOCIAL HISTORY      Social History     Tobacco Use    Smoking status: Current Every Day Smoker     Years: 13.00     Types: Cigarettes    Smokeless tobacco: Never Used    Tobacco comment: \"5-8 cigs per day. \"  8/24/2017   Substance Use Topics    Alcohol use: Yes     Comment: social.   Pt has some alcohol in prior to knowledge of preg. Gina  reports that she has been smoking cigarettes. She has smoked for the past 13.00 years.  She has never used smokeless tobacco.    FAMILY HISTORY:      Family History   Problem Relation Age of Onset    Other Mother         Chrons    Hypertension Father     No Known Problems Sister         3 sisters with asthma    Other Brother         2 brothers in good health    No Known Problems Maternal Grandmother     No Known Problems Maternal Grandfather     No Known Problems Paternal Grandmother     No Known Problems Paternal Grandfather        REVIEW OF SYSTEMS:    Review of Systems   Constitutional: Positive for activity change and unexpected weight change. HENT: Negative. Eyes: Positive for discharge and visual disturbance. Respiratory: Negative. Cardiovascular: Negative. Gastrointestinal: Negative. Endocrine: Negative. Genitourinary: Negative. Musculoskeletal: Negative. Skin: Negative. Allergic/Immunologic: Negative. Neurological: Negative. Hematological: Negative. Psychiatric/Behavioral: Negative.         PHYSICAL EXAM:      Vitals:    06/06/19 1321   BP: 116/73   Pulse: 59     BP Readings from Last 3 Encounters:   06/06/19 116/73   11/09/18 109/72   07/26/18 135/84       Physical Examination: General appearance - alert, well appearing, and in no distress  Mental status - alert, oriented to person, place, and time  Chest - clear to auscultation, no wheezes, rales or rhonchi, symmetric air entry  Heart - normal rate and regular rhythm  Abdomen - soft, nontender, nondistended, no masses or organomegaly  bowel sounds normal  Back exam - limited range of motion  Neurological - alert, oriented, normal speech, no focal findings or movement disorder noted  Musculoskeletal - no joint tenderness, deformity or swelling  Extremities - no pedal edema noted    LABORATORY FINDINGS:    CBC:   Lab Results   Component Value Date    WBC 7.6 04/07/2017    HGB 11.9 04/07/2017     04/07/2017     BMP:    Lab Results   Component Value Date     02/08/2017    K 3.5 02/08/2017     02/08/2017    CO2 19 02/08/2017    BUN 11 02/08/2017    CREATININE 0.60 02/08/2017    GLUCOSE 82 02/08/2017     Hemoglobin A1C: No results found for: LABA1C  Microalbumin Urine: No results found for: MICROALBUR  Lipid profile:   Lab Results   Component Value Date    CHOL 185 10/15/2014    TRIG 56 10/15/2014    HDL 93 10/15/2014     Thyroid functions:   Lab Results   Component

## 2019-06-06 NOTE — PATIENT INSTRUCTIONS
Script for lab given to pt, no fasting required. Pt will get labs done as soon as possible. Patient to return to clinic 1 month (7/9/19). AVS reviewed and given to pt. It is very important for your care that you keep your appointment. If for some reason you are unable to keep your appointment it is equally important that you call our office at [phone] to cancel your appointment and reschedule. Failure to do so may result in your termination from our practice.   MB

## 2019-06-07 LAB — ANTI-NUCLEAR ANTIBODY (ANA): NEGATIVE

## 2019-06-10 ENCOUNTER — TELEPHONE (OUTPATIENT)
Dept: INTERNAL MEDICINE | Age: 29
End: 2019-06-10

## 2019-07-16 ENCOUNTER — HOSPITAL ENCOUNTER (EMERGENCY)
Age: 29
Discharge: HOME | End: 2019-07-16
Attending: EMERGENCY MEDICINE
Payer: MEDICARE

## 2019-07-16 VITALS
HEIGHT: 64 IN | BODY MASS INDEX: 17.93 KG/M2 | OXYGEN SATURATION: 98 % | SYSTOLIC BLOOD PRESSURE: 105 MMHG | TEMPERATURE: 97 F | RESPIRATION RATE: 18 BRPM | WEIGHT: 105 LBS | DIASTOLIC BLOOD PRESSURE: 73 MMHG | HEART RATE: 95 BPM

## 2019-07-16 DIAGNOSIS — H10.31 ACUTE CONJUNCTIVITIS OF RIGHT EYE, UNSPECIFIED ACUTE CONJUNCTIVITIS TYPE: Primary | ICD-10-CM

## 2019-07-16 PROCEDURE — 99282 EMERGENCY DEPT VISIT SF MDM: CPT

## 2019-07-16 RX ORDER — POLYMYXIN B SULFATE AND TRIMETHOPRIM 1; 10000 MG/ML; [USP'U]/ML
1 SOLUTION OPHTHALMIC EVERY 4 HOURS
Qty: 1 BOTTLE | Refills: 0 | Status: SHIPPED | OUTPATIENT
Start: 2019-07-16 | End: 2019-07-22 | Stop reason: ALTCHOICE

## 2019-07-16 ASSESSMENT — PAIN SCALES - GENERAL: PAINLEVEL_OUTOF10: 1

## 2019-07-16 ASSESSMENT — ENCOUNTER SYMPTOMS
CONSTIPATION: 0
PHOTOPHOBIA: 0
DIARRHEA: 0
ABDOMINAL DISTENTION: 0
ABDOMINAL PAIN: 0
SHORTNESS OF BREATH: 0
VOMITING: 0
EYE PAIN: 1
SINUS PAIN: 0
BLOOD IN STOOL: 0
EYE REDNESS: 1
WHEEZING: 0
EYE ITCHING: 1
EYE DISCHARGE: 1
NAUSEA: 0
RHINORRHEA: 1
SORE THROAT: 1
SINUS PRESSURE: 0
CHEST TIGHTNESS: 0

## 2019-07-16 ASSESSMENT — PAIN DESCRIPTION - LOCATION: LOCATION: EYE

## 2019-07-16 ASSESSMENT — PAIN DESCRIPTION - ORIENTATION: ORIENTATION: RIGHT

## 2019-07-16 ASSESSMENT — PAIN DESCRIPTION - DESCRIPTORS: DESCRIPTORS: ACHING;BURNING;DISCOMFORT

## 2019-07-16 NOTE — ED NOTES
Patient to ed with c/o right eye burning and redness starting this am.  Patient states her son had pink eye now she has it. Patient states it was matted shut with green discharge this am. Patient denies any foreign body sensation, blurry or double vision. Patient denies injury. Patient denies contact lens wear.       Yuliya Rincon RN  07/16/19 [id number]

## 2019-07-16 NOTE — ED PROVIDER NOTES
[facility]     Emergency Department     Faculty Attestation    I performed a history and physical examination of the patient and discussed management with the resident. I have reviewed and agree with the residents findings including all diagnostic interpretations, and treatment plans as written at the time of my review. Any areas of disagreement are noted on the chart. I was personally present for the key portions of any procedures. I have documented in the chart those procedures where I was not present during the key portions. For Physician Assistant/ Nurse Practitioner cases/documentation I have personally evaluated this patient and have completed at least one if not all key elements of the E/M (history, physical exam, and MDM). Additional findings are as noted. Primary Care Physician: Zack Loving MD    History: This is a 29 y.o. female who presents to the Emergency Department with complaint of conjunctivitis. The patient noted some redness with discharge from the right eye that began yesterday. She has had a son with similar symptoms that have resolved and another son that currently has the same symptoms. Physical:   height is 5' 4\" (1.626 m) and weight is 105 lb (47.6 kg). Her oral temperature is 97 °F (36.1 °C). Her blood pressure is 105/73 and her pulse is 95. Her respiration is 18 and oxygen saturation is 98%. Conjunctiva is injected with a clear discharge noted. This is in the right eye left eye is clear. Impression: Right eye conjunctivitis    Plan: Ophthalmic medication    (Please note that portions of this note were completed with a voice recognition program.  Efforts were made to edit the dictations but occasionally words are mis-transcribed.)    Micki King.  Lissy Clifton MD, [facility]  Attending Emergency Medicine Physician        Merry Banks MD  07/16/19 0747

## 2019-07-16 NOTE — ED PROVIDER NOTES
[facility] ED  Emergency Department Encounter  Emergency Medicine Resident     Pt Name: Ferdinand Pierce  MRN: [id number]  [place] 1990  Date of evaluation: 7/16/19  PCP:  Beverly Pierce MD    [address] [place] [address]       Chief Complaint   Patient presents with    Conjunctivitis     right eye       HISTORY OFPRESENT ILLNESS  (Location/Symptom, Timing/Onset, Context/Setting, Quality, Duration, Modifying Factors,Severity.)      Ferdinand Pierce is a 41-year-old female who presents with conjunctivitis of her right eye. Patient woke up this morning with her right eye stuck shut. She cleaned of her eye and noticed that she had some injection, and I was itchy, and burning. This is associated with right ear pain, sore throat, rhinorrhea. Patient denies visual changes, fevers, chills, nausea, vomiting, chest pain, shortness of breath, abdominal pain. Patient's son had conjunctivitis a week ago. PAST MEDICAL / SURGICAL / SOCIAL / FAMILY HISTORY      has a past medical history of Asthma, H/O chlamydia infection, Marijuana smoker, and Postpartum depression. has a past surgical history that includes osteotomy (Bilateral, 12/10/2015). Social History     Socioeconomic History    Marital status: Single     Spouse name: Not on file    Number of children: Not on file    Years of education: Not on file    Highest education level: Not on file   Occupational History    Not on file   Social Needs    Financial resource strain: Not on file    Food insecurity:     Worry: Not on file     Inability: Not on file    Transportation needs:     Medical: Not on file     Non-medical: Not on file   Tobacco Use    Smoking status: Current Every Day Smoker     Years: 13.00     Types: Cigarettes    Smokeless tobacco: Never Used    Tobacco comment: \"5-8 cigs per day. \"  8/24/2017   Substance and Sexual Activity    Alcohol use: Yes     Comment: social.   Pt has some alcohol in prior to knowledge of preg.      Drug use: No     Frequency: 2.0 times per week     Types: Marijuana     Comment: last used week of march 13    Sexual activity: Yes     Partners: Male   Lifestyle    Physical activity:     Days per week: Not on file     Minutes per session: Not on file    Stress: Not on file   Relationships    Social connections:     Talks on phone: Not on file     Gets together: Not on file     Attends Hoahaoism service: Not on file     Active member of club or organization: Not on file     Attends meetings of clubs or organizations: Not on file     Relationship status: Not on file    Intimate partner violence:     Fear of current or ex partner: Not on file     Emotionally abused: Not on file     Physically abused: Not on file     Forced sexual activity: Not on file   Other Topics Concern    Not on file   Social History Narrative    Currently lives with her son and his father. Feels safe at home. Does not work. Started college but did not finish. Family History   Problem Relation Age of Onset    Other Mother         Chrons    Hypertension Father     No Known Problems Sister         3 sisters with asthma    Other Brother         2 brothers in good health    No Known Problems Maternal Grandmother     No Known Problems Maternal Grandfather     No Known Problems Paternal Grandmother     No Known Problems Paternal Grandfather         Allergies:  Patient has no known allergies. Home Medications:  Prior to Admission medications    Medication Sig Start Date End Date Taking?  Authorizing Provider   trimethoprim-polymyxin b (POLYTRIM) 95709-0.1 UNIT/ML-% ophthalmic solution Place 1 drop into the right eye every 4 hours for 7 days 7/16/19 7/23/19 Yes Amanda Monae MD   mometasone-formoterol [facility]) 100-5 MCG/ACT inhaler inhale 2 puffs by mouth every 12 hours 6/6/19   Reginaldo Cardozo MD   albuterol sulfate HFA (VENTOLIN HFA) 108 (90 Base) MCG/ACT inhaler inhale 2 puffs by mouth every 4 hours if needed 6/6/19   Levar occasionally words are mis-transcribed.)        Martha Kaye MD  Resident  07/16/19 1127

## 2019-07-22 ENCOUNTER — HOSPITAL ENCOUNTER (OUTPATIENT)
Age: 29
Setting detail: SPECIMEN
Discharge: HOME | End: 2019-07-22
Payer: MEDICARE

## 2019-07-22 ENCOUNTER — OFFICE VISIT (OUTPATIENT)
Dept: OBGYN | Age: 29
End: 2019-07-22
Payer: MEDICARE

## 2019-07-22 VITALS
HEIGHT: 64 IN | WEIGHT: 98.56 LBS | SYSTOLIC BLOOD PRESSURE: 101 MMHG | HEART RATE: 102 BPM | DIASTOLIC BLOOD PRESSURE: 72 MMHG | BODY MASS INDEX: 16.83 KG/M2

## 2019-07-22 DIAGNOSIS — Z71.85 VACCINE COUNSELING: ICD-10-CM

## 2019-07-22 DIAGNOSIS — Z30.09 FAMILY PLANNING: Primary | ICD-10-CM

## 2019-07-22 DIAGNOSIS — Z01.419 WELL WOMAN EXAM WITH ROUTINE GYNECOLOGICAL EXAM: ICD-10-CM

## 2019-07-22 PROBLEM — J45.909 ASTHMA AFFECTING PREGNANCY, ANTEPARTUM: Status: RESOLVED | Noted: 2017-06-01 | Resolved: 2019-07-22

## 2019-07-22 PROBLEM — O09.893 NONCOMPLIANT PREGNANT PATIENT IN THIRD TRIMESTER: Status: RESOLVED | Noted: 2017-09-11 | Resolved: 2019-07-22

## 2019-07-22 PROBLEM — Z91.199 NONCOMPLIANT PREGNANT PATIENT IN THIRD TRIMESTER: Status: RESOLVED | Noted: 2017-09-11 | Resolved: 2019-07-22

## 2019-07-22 PROBLEM — O99.519 ASTHMA AFFECTING PREGNANCY, ANTEPARTUM: Status: RESOLVED | Noted: 2017-06-01 | Resolved: 2019-07-22

## 2019-07-22 PROBLEM — O09.91 HIGH-RISK PREGNANCY IN FIRST TRIMESTER: Status: RESOLVED | Noted: 2017-03-28 | Resolved: 2019-07-22

## 2019-07-22 PROBLEM — O28.8 NST (NON-STRESS TEST) NONREACTIVE: Status: RESOLVED | Noted: 2017-08-21 | Resolved: 2019-07-22

## 2019-07-22 PROBLEM — O35.BXX0 FETAL CARDIAC ECHOGENIC FOCUS, ANTEPARTUM: Status: RESOLVED | Noted: 2017-06-01 | Resolved: 2019-07-22

## 2019-07-22 PROBLEM — O46.90 VAGINAL BLEEDING IN PREGNANCY: Status: RESOLVED | Noted: 2017-04-06 | Resolved: 2019-07-22

## 2019-07-22 PROBLEM — O99.330 TOBACCO USE DISORDER COMPLICATING PREGNANCY, CHILDBIRTH, OR PUERPERIUM, ANTEPARTUM: Status: RESOLVED | Noted: 2017-06-01 | Resolved: 2019-07-22

## 2019-07-22 PROBLEM — O26.10 LOW WEIGHT GAIN IN PREGNANCY: Status: RESOLVED | Noted: 2017-06-01 | Resolved: 2019-07-22

## 2019-07-22 PROBLEM — O09.899 SINGLE UMBILICAL ARTERY, MATERNAL, ANTEPARTUM: Status: RESOLVED | Noted: 2017-06-01 | Resolved: 2019-07-22

## 2019-07-22 PROBLEM — O35.BXX0 FETAL CARDIAC ANOMALY COMPLICATING PREGNANCY, ANTEPARTUM: Status: RESOLVED | Noted: 2017-06-22 | Resolved: 2019-07-22

## 2019-07-22 LAB
CONTROL: PRESENT
PREGNANCY TEST URINE, POC: NEGATIVE

## 2019-07-22 PROCEDURE — 96372 THER/PROPH/DIAG INJ SC/IM: CPT

## 2019-07-22 PROCEDURE — 99395 PREV VISIT EST AGE 18-39: CPT | Performed by: STUDENT IN AN ORGANIZED HEALTH CARE EDUCATION/TRAINING PROGRAM

## 2019-07-22 PROCEDURE — 99211 OFF/OP EST MAY X REQ PHY/QHP: CPT | Performed by: STUDENT IN AN ORGANIZED HEALTH CARE EDUCATION/TRAINING PROGRAM

## 2019-07-22 PROCEDURE — 81025 URINE PREGNANCY TEST: CPT | Performed by: STUDENT IN AN ORGANIZED HEALTH CARE EDUCATION/TRAINING PROGRAM

## 2019-07-22 RX ORDER — MEDROXYPROGESTERONE ACETATE 150 MG/ML
150 INJECTION, SUSPENSION INTRAMUSCULAR ONCE
Status: COMPLETED | OUTPATIENT
Start: 2019-07-22 | End: 2019-07-22

## 2019-07-22 RX ORDER — MEDROXYPROGESTERONE ACETATE 150 MG/ML
150 INJECTION, SUSPENSION INTRAMUSCULAR
Qty: 1 ML | Refills: 4 | Status: SHIPPED | OUTPATIENT
Start: 2019-07-22 | End: 2020-08-20 | Stop reason: SDUPTHER

## 2019-07-22 RX ADMIN — MEDROXYPROGESTERONE ACETATE 150 MG: 150 INJECTION, SUSPENSION INTRAMUSCULAR at 15:58

## 2019-07-22 ASSESSMENT — PATIENT HEALTH QUESTIONNAIRE - PHQ9
SUM OF ALL RESPONSES TO PHQ QUESTIONS 1-9: 0
SUM OF ALL RESPONSES TO PHQ9 QUESTIONS 1 & 2: 0
2. FEELING DOWN, DEPRESSED OR HOPELESS: 0
1. LITTLE INTEREST OR PLEASURE IN DOING THINGS: 0
SUM OF ALL RESPONSES TO PHQ QUESTIONS 1-9: 0

## 2019-07-22 NOTE — PROGRESS NOTES
Attending Physician Statement  I have discussed the care of Gina Mackayford, including pertinent history and exam findings,  with the resident. I have reviewed the key elements of all parts of the encounter with the resident. I agree with the assessment, plan and orders as documented by the resident.   (GE Modifier)

## 2019-07-23 LAB
C TRACH DNA GENITAL QL NAA+PROBE: NEGATIVE
DIRECT EXAM: NORMAL
Lab: NORMAL
N. GONORRHOEAE DNA: NEGATIVE
SPECIMEN DESCRIPTION: NORMAL
SPECIMEN DESCRIPTION: NORMAL

## 2019-09-29 ENCOUNTER — HOSPITAL ENCOUNTER (EMERGENCY)
Age: 29
Discharge: HOME | End: 2019-09-29
Attending: EMERGENCY MEDICINE
Payer: MEDICARE

## 2019-09-29 ENCOUNTER — APPOINTMENT (OUTPATIENT)
Dept: GENERAL RADIOLOGY | Age: 29
End: 2019-09-29
Payer: MEDICARE

## 2019-09-29 VITALS
WEIGHT: 110 LBS | HEIGHT: 64 IN | SYSTOLIC BLOOD PRESSURE: 103 MMHG | TEMPERATURE: 97.3 F | DIASTOLIC BLOOD PRESSURE: 75 MMHG | BODY MASS INDEX: 18.78 KG/M2 | RESPIRATION RATE: 18 BRPM | HEART RATE: 87 BPM | OXYGEN SATURATION: 99 %

## 2019-09-29 DIAGNOSIS — J45.901 MILD ASTHMA WITH EXACERBATION, UNSPECIFIED WHETHER PERSISTENT: Primary | ICD-10-CM

## 2019-09-29 DIAGNOSIS — J45.40 MODERATE PERSISTENT ASTHMA WITHOUT COMPLICATION: ICD-10-CM

## 2019-09-29 PROCEDURE — 94640 AIRWAY INHALATION TREATMENT: CPT

## 2019-09-29 PROCEDURE — 99284 EMERGENCY DEPT VISIT MOD MDM: CPT

## 2019-09-29 PROCEDURE — 71045 X-RAY EXAM CHEST 1 VIEW: CPT

## 2019-09-29 PROCEDURE — 6360000002 HC RX W HCPCS: Performed by: STUDENT IN AN ORGANIZED HEALTH CARE EDUCATION/TRAINING PROGRAM

## 2019-09-29 PROCEDURE — 6370000000 HC RX 637 (ALT 250 FOR IP): Performed by: STUDENT IN AN ORGANIZED HEALTH CARE EDUCATION/TRAINING PROGRAM

## 2019-09-29 RX ORDER — ALBUTEROL SULFATE 90 UG/1
2 AEROSOL, METERED RESPIRATORY (INHALATION)
Status: DISCONTINUED | OUTPATIENT
Start: 2019-09-29 | End: 2019-09-29 | Stop reason: HOSPADM

## 2019-09-29 RX ORDER — ALBUTEROL SULFATE 90 UG/1
AEROSOL, METERED RESPIRATORY (INHALATION)
Qty: 18 G | Refills: 0 | Status: ON HOLD | OUTPATIENT
Start: 2019-09-29 | End: 2019-11-11 | Stop reason: SDUPTHER

## 2019-09-29 RX ORDER — ALBUTEROL SULFATE 2.5 MG/3ML
5 SOLUTION RESPIRATORY (INHALATION)
Status: DISCONTINUED | OUTPATIENT
Start: 2019-09-29 | End: 2019-09-29 | Stop reason: HOSPADM

## 2019-09-29 RX ORDER — IPRATROPIUM BROMIDE AND ALBUTEROL SULFATE 2.5; .5 MG/3ML; MG/3ML
1 SOLUTION RESPIRATORY (INHALATION)
Status: DISCONTINUED | OUTPATIENT
Start: 2019-09-29 | End: 2019-09-29 | Stop reason: HOSPADM

## 2019-09-29 RX ADMIN — ALBUTEROL SULFATE 2 PUFF: 90 AEROSOL, METERED RESPIRATORY (INHALATION) at 16:43

## 2019-09-29 RX ADMIN — ALBUTEROL SULFATE 5 MG: 5 SOLUTION RESPIRATORY (INHALATION) at 16:38

## 2019-09-29 ASSESSMENT — ENCOUNTER SYMPTOMS
ABDOMINAL PAIN: 0
SHORTNESS OF BREATH: 1
NAUSEA: 0
STRIDOR: 0
COUGH: 0
VOMITING: 0
BACK PAIN: 0
WHEEZING: 1
SORE THROAT: 0

## 2019-10-15 ENCOUNTER — HOSPITAL ENCOUNTER (EMERGENCY)
Age: 29
Discharge: HOME | End: 2019-10-15
Attending: EMERGENCY MEDICINE
Payer: MEDICARE

## 2019-10-15 VITALS
TEMPERATURE: 98.1 F | HEART RATE: 73 BPM | BODY MASS INDEX: 17.93 KG/M2 | DIASTOLIC BLOOD PRESSURE: 67 MMHG | SYSTOLIC BLOOD PRESSURE: 105 MMHG | OXYGEN SATURATION: 96 % | WEIGHT: 105 LBS | RESPIRATION RATE: 23 BRPM | HEIGHT: 64 IN

## 2019-10-15 DIAGNOSIS — J45.41 MODERATE PERSISTENT ASTHMA WITH EXACERBATION: Primary | ICD-10-CM

## 2019-10-15 PROCEDURE — 99285 EMERGENCY DEPT VISIT HI MDM: CPT

## 2019-10-15 PROCEDURE — 6370000000 HC RX 637 (ALT 250 FOR IP): Performed by: STUDENT IN AN ORGANIZED HEALTH CARE EDUCATION/TRAINING PROGRAM

## 2019-10-15 PROCEDURE — 6360000002 HC RX W HCPCS: Performed by: STUDENT IN AN ORGANIZED HEALTH CARE EDUCATION/TRAINING PROGRAM

## 2019-10-15 PROCEDURE — 94640 AIRWAY INHALATION TREATMENT: CPT

## 2019-10-15 RX ORDER — ALBUTEROL SULFATE 2.5 MG/3ML
5 SOLUTION RESPIRATORY (INHALATION)
Status: DISCONTINUED | OUTPATIENT
Start: 2019-10-15 | End: 2019-10-16 | Stop reason: HOSPADM

## 2019-10-15 RX ORDER — ALBUTEROL SULFATE 90 UG/1
2 AEROSOL, METERED RESPIRATORY (INHALATION)
Status: DISCONTINUED | OUTPATIENT
Start: 2019-10-15 | End: 2019-10-16 | Stop reason: HOSPADM

## 2019-10-15 RX ORDER — IPRATROPIUM BROMIDE AND ALBUTEROL SULFATE 2.5; .5 MG/3ML; MG/3ML
1 SOLUTION RESPIRATORY (INHALATION)
Status: DISCONTINUED | OUTPATIENT
Start: 2019-10-16 | End: 2019-10-16 | Stop reason: HOSPADM

## 2019-10-15 RX ORDER — PREDNISONE 10 MG/1
40 TABLET ORAL DAILY
Qty: 16 TABLET | Refills: 0 | Status: SHIPPED | OUTPATIENT
Start: 2019-10-16 | End: 2019-10-20

## 2019-10-15 RX ORDER — PREDNISONE 20 MG/1
40 TABLET ORAL ONCE
Status: COMPLETED | OUTPATIENT
Start: 2019-10-15 | End: 2019-10-15

## 2019-10-15 RX ORDER — ALBUTEROL SULFATE 90 UG/1
2 AEROSOL, METERED RESPIRATORY (INHALATION) 4 TIMES DAILY PRN
Qty: 2 INHALER | Refills: 0 | Status: SHIPPED | OUTPATIENT
Start: 2019-10-15 | End: 2020-02-06

## 2019-10-15 RX ADMIN — PREDNISONE 40 MG: 20 TABLET ORAL at 21:51

## 2019-10-15 RX ADMIN — ALBUTEROL SULFATE 5 MG: 5 SOLUTION RESPIRATORY (INHALATION) at 20:59

## 2019-10-15 RX ADMIN — ALBUTEROL SULFATE 5 MG: 5 SOLUTION RESPIRATORY (INHALATION) at 22:35

## 2019-10-15 RX ADMIN — IPRATROPIUM BROMIDE 0.5 MG: 0.5 SOLUTION RESPIRATORY (INHALATION) at 20:59

## 2019-10-15 ASSESSMENT — ENCOUNTER SYMPTOMS
SORE THROAT: 0
ABDOMINAL PAIN: 0
DIARRHEA: 0
SINUS PAIN: 0
WHEEZING: 1
NAUSEA: 0
EYES NEGATIVE: 1
RHINORRHEA: 0
SHORTNESS OF BREATH: 1
VOMITING: 0

## 2019-11-04 ENCOUNTER — APPOINTMENT (OUTPATIENT)
Dept: GENERAL RADIOLOGY | Age: 29
DRG: 130 | End: 2019-11-04
Payer: MEDICARE

## 2019-11-04 ENCOUNTER — HOSPITAL ENCOUNTER (INPATIENT)
Age: 29
LOS: 7 days | Discharge: LEFT BEFORE DISCHARGE | DRG: 130 | End: 2019-11-11
Attending: EMERGENCY MEDICINE | Admitting: INTERNAL MEDICINE
Payer: MEDICARE

## 2019-11-04 DIAGNOSIS — J45.52 SEVERE PERSISTENT ASTHMA WITH STATUS ASTHMATICUS: Primary | ICD-10-CM

## 2019-11-04 DIAGNOSIS — J45.40 MODERATE PERSISTENT ASTHMA WITHOUT COMPLICATION: ICD-10-CM

## 2019-11-04 PROBLEM — J45.902 STATUS ASTHMATICUS: Status: ACTIVE | Noted: 2019-11-04

## 2019-11-04 LAB
ABSOLUTE EOS #: 0.82 K/UL (ref 0–0.4)
ABSOLUTE IMMATURE GRANULOCYTE: 0 K/UL (ref 0–0.3)
ABSOLUTE LYMPH #: 6.63 K/UL (ref 1–4.8)
ABSOLUTE MONO #: 0.31 K/UL (ref 0.1–0.8)
ACTION: NORMAL
ALLEN TEST: ABNORMAL
ANION GAP SERPL CALCULATED.3IONS-SCNC: 11 MMOL/L (ref 9–17)
ANION GAP SERPL CALCULATED.3IONS-SCNC: 19 MMOL/L (ref 9–17)
ANION GAP: 6 MMOL/L (ref 7–16)
BASOPHILS # BLD: 2 % (ref 0–2)
BASOPHILS ABSOLUTE: 0.2 K/UL (ref 0–0.2)
BUN BLDV-MCNC: 11 MG/DL (ref 6–20)
BUN BLDV-MCNC: 9 MG/DL (ref 6–20)
BUN/CREAT BLD: ABNORMAL (ref 9–20)
BUN/CREAT BLD: ABNORMAL (ref 9–20)
CALCIUM SERPL-MCNC: 7 MG/DL (ref 8.6–10.4)
CALCIUM SERPL-MCNC: 7.7 MG/DL (ref 8.6–10.4)
CHLORIDE BLD-SCNC: 106 MMOL/L (ref 98–107)
CHLORIDE BLD-SCNC: 108 MMOL/L (ref 98–107)
CO2: 18 MMOL/L (ref 20–31)
CO2: 18 MMOL/L (ref 20–31)
CREAT SERPL-MCNC: 0.61 MG/DL (ref 0.5–0.9)
CREAT SERPL-MCNC: 0.64 MG/DL (ref 0.5–0.9)
DATE AND TIME: NORMAL
DIFFERENTIAL TYPE: ABNORMAL
EOSINOPHILS RELATIVE PERCENT: 8 % (ref 1–4)
FIO2: 100
FIO2: 60
FIO2: 60
FIO2: ABNORMAL
GFR AFRICAN AMERICAN: >60 ML/MIN
GFR AFRICAN AMERICAN: >60 ML/MIN
GFR NON-AFRICAN AMERICAN: >60 ML/MIN
GFR SERPL CREATININE-BSD FRML MDRD: >60 ML/MIN
GFR SERPL CREATININE-BSD FRML MDRD: ABNORMAL ML/MIN/{1.73_M2}
GFR SERPL CREATININE-BSD FRML MDRD: NORMAL ML/MIN/{1.73_M2}
GLUCOSE BLD-MCNC: 177 MG/DL (ref 70–99)
GLUCOSE BLD-MCNC: 184 MG/DL (ref 74–100)
GLUCOSE BLD-MCNC: 223 MG/DL (ref 74–100)
GLUCOSE BLD-MCNC: 226 MG/DL (ref 70–99)
HCO3 VENOUS: 26.3 MMOL/L (ref 22–29)
HCT VFR BLD CALC: 43.1 % (ref 36.3–47.1)
HEMOGLOBIN: 13.5 G/DL (ref 11.9–15.1)
IMMATURE GRANULOCYTES: 0 %
LYMPHOCYTES # BLD: 65 % (ref 24–44)
MCH RBC QN AUTO: 29 PG (ref 25.2–33.5)
MCHC RBC AUTO-ENTMCNC: 31.3 G/DL (ref 28.4–34.8)
MCV RBC AUTO: 92.5 FL (ref 82.6–102.9)
MODE: ABNORMAL
MONOCYTES # BLD: 3 % (ref 1–7)
MORPHOLOGY: NORMAL
NEGATIVE BASE EXCESS, ART: 4 (ref 0–2)
NEGATIVE BASE EXCESS, ART: 7 (ref 0–2)
NEGATIVE BASE EXCESS, ART: 8 (ref 0–2)
NEGATIVE BASE EXCESS, VEN: 5 (ref 0–2)
NOTIFY: NORMAL
NRBC AUTOMATED: 0 PER 100 WBC
O2 DEVICE/FLOW/%: ABNORMAL
O2 SAT, VEN: 100 % (ref 60–85)
PATIENT TEMP: 35.8
PATIENT TEMP: ABNORMAL
PCO2, VEN: 84.1 MM HG (ref 41–51)
PDW BLD-RTO: 12.7 % (ref 11.8–14.4)
PH VENOUS: 7.1 (ref 7.32–7.43)
PLATELET # BLD: 267 K/UL (ref 138–453)
PLATELET ESTIMATE: ABNORMAL
PMV BLD AUTO: 10.3 FL (ref 8.1–13.5)
PO2, VEN: 235.9 MM HG (ref 30–50)
POC CHLORIDE: 107 MMOL/L (ref 98–107)
POC CREATININE: 0.67 MG/DL (ref 0.51–1.19)
POC HCO3: 24.8 MMOL/L (ref 21–28)
POC HCO3: 25.5 MMOL/L (ref 21–28)
POC HCO3: 26.7 MMOL/L (ref 21–28)
POC HEMATOCRIT: 41 % (ref 36–46)
POC HEMOGLOBIN: 13.8 G/DL (ref 12–16)
POC IONIZED CALCIUM: 1.15 MMOL/L (ref 1.15–1.33)
POC LACTIC ACID: 1.4 MMOL/L (ref 0.56–1.39)
POC LACTIC ACID: 6.16 MMOL/L (ref 0.56–1.39)
POC O2 SATURATION: 100 % (ref 94–98)
POC O2 SATURATION: 97 % (ref 94–98)
POC O2 SATURATION: 97 % (ref 94–98)
POC PCO2 TEMP: ABNORMAL MM HG
POC PCO2: 102.4 MM HG (ref 35–48)
POC PCO2: 58.6 MM HG (ref 35–48)
POC PCO2: 91.1 MM HG (ref 35–48)
POC PH TEMP: ABNORMAL
POC PH: 7.02 (ref 7.35–7.45)
POC PH: 7.05 (ref 7.35–7.45)
POC PH: 7.23 (ref 7.35–7.45)
POC PO2 TEMP: ABNORMAL MM HG
POC PO2: 113.8 MM HG (ref 83–108)
POC PO2: 135.9 MM HG (ref 83–108)
POC PO2: 574.1 MM HG (ref 83–108)
POC POTASSIUM: 3.4 MMOL/L (ref 3.5–4.5)
POC SODIUM: 139 MMOL/L (ref 138–146)
POSITIVE BASE EXCESS, ART: ABNORMAL (ref 0–3)
POSITIVE BASE EXCESS, VEN: ABNORMAL (ref 0–3)
POTASSIUM SERPL-SCNC: 3.4 MMOL/L (ref 3.7–5.3)
POTASSIUM SERPL-SCNC: 3.5 MMOL/L (ref 3.7–5.3)
RBC # BLD: 4.66 M/UL (ref 3.95–5.11)
RBC # BLD: ABNORMAL 10*6/UL
READ BACK: YES
SAMPLE SITE: ABNORMAL
SEG NEUTROPHILS: 22 % (ref 36–66)
SEGMENTED NEUTROPHILS ABSOLUTE COUNT: 2.24 K/UL (ref 1.8–7.7)
SODIUM BLD-SCNC: 135 MMOL/L (ref 135–144)
SODIUM BLD-SCNC: 145 MMOL/L (ref 135–144)
TCO2 (CALC), ART: 27 MMOL/L (ref 22–29)
TCO2 (CALC), ART: 28 MMOL/L (ref 22–29)
TCO2 (CALC), ART: 30 MMOL/L (ref 22–29)
TOTAL CO2, VENOUS: 29 MMOL/L (ref 23–30)
WBC # BLD: 10.2 K/UL (ref 3.5–11.3)
WBC # BLD: ABNORMAL 10*3/UL

## 2019-11-04 PROCEDURE — C9113 INJ PANTOPRAZOLE SODIUM, VIA: HCPCS | Performed by: STUDENT IN AN ORGANIZED HEALTH CARE EDUCATION/TRAINING PROGRAM

## 2019-11-04 PROCEDURE — 82435 ASSAY OF BLOOD CHLORIDE: CPT

## 2019-11-04 PROCEDURE — 83605 ASSAY OF LACTIC ACID: CPT

## 2019-11-04 PROCEDURE — 82565 ASSAY OF CREATININE: CPT

## 2019-11-04 PROCEDURE — 2500000003 HC RX 250 WO HCPCS: Performed by: INTERNAL MEDICINE

## 2019-11-04 PROCEDURE — 85014 HEMATOCRIT: CPT

## 2019-11-04 PROCEDURE — 87633 RESP VIRUS 12-25 TARGETS: CPT

## 2019-11-04 PROCEDURE — 37799 UNLISTED PX VASCULAR SURGERY: CPT

## 2019-11-04 PROCEDURE — 94644 CONT INHLJ TX 1ST HOUR: CPT

## 2019-11-04 PROCEDURE — 84295 ASSAY OF SERUM SODIUM: CPT

## 2019-11-04 PROCEDURE — 36620 INSERTION CATHETER ARTERY: CPT | Performed by: INTERNAL MEDICINE

## 2019-11-04 PROCEDURE — 71045 X-RAY EXAM CHEST 1 VIEW: CPT

## 2019-11-04 PROCEDURE — 2580000003 HC RX 258: Performed by: STUDENT IN AN ORGANIZED HEALTH CARE EDUCATION/TRAINING PROGRAM

## 2019-11-04 PROCEDURE — 99285 EMERGENCY DEPT VISIT HI MDM: CPT

## 2019-11-04 PROCEDURE — 2580000003 HC RX 258: Performed by: INTERNAL MEDICINE

## 2019-11-04 PROCEDURE — 94002 VENT MGMT INPAT INIT DAY: CPT

## 2019-11-04 PROCEDURE — 82330 ASSAY OF CALCIUM: CPT

## 2019-11-04 PROCEDURE — 83735 ASSAY OF MAGNESIUM: CPT

## 2019-11-04 PROCEDURE — 94770 HC ETCO2 MONITOR DAILY: CPT

## 2019-11-04 PROCEDURE — 6360000002 HC RX W HCPCS: Performed by: INTERNAL MEDICINE

## 2019-11-04 PROCEDURE — 6360000002 HC RX W HCPCS: Performed by: STUDENT IN AN ORGANIZED HEALTH CARE EDUCATION/TRAINING PROGRAM

## 2019-11-04 PROCEDURE — 2000000000 HC ICU R&B

## 2019-11-04 PROCEDURE — 89220 SPUTUM SPECIMEN COLLECTION: CPT

## 2019-11-04 PROCEDURE — 94640 AIRWAY INHALATION TREATMENT: CPT

## 2019-11-04 PROCEDURE — 2500000003 HC RX 250 WO HCPCS: Performed by: STUDENT IN AN ORGANIZED HEALTH CARE EDUCATION/TRAINING PROGRAM

## 2019-11-04 PROCEDURE — 82947 ASSAY GLUCOSE BLOOD QUANT: CPT

## 2019-11-04 PROCEDURE — 87581 M.PNEUMON DNA AMP PROBE: CPT

## 2019-11-04 PROCEDURE — 85025 COMPLETE CBC W/AUTO DIFF WBC: CPT

## 2019-11-04 PROCEDURE — 6360000002 HC RX W HCPCS

## 2019-11-04 PROCEDURE — 99291 CRITICAL CARE FIRST HOUR: CPT | Performed by: INTERNAL MEDICINE

## 2019-11-04 PROCEDURE — 87205 SMEAR GRAM STAIN: CPT

## 2019-11-04 PROCEDURE — 94645 CONT INHLJ TX EACH ADDL HOUR: CPT

## 2019-11-04 PROCEDURE — 5A1955Z RESPIRATORY VENTILATION, GREATER THAN 96 CONSECUTIVE HOURS: ICD-10-PCS | Performed by: INTERNAL MEDICINE

## 2019-11-04 PROCEDURE — 94761 N-INVAS EAR/PLS OXIMETRY MLT: CPT

## 2019-11-04 PROCEDURE — 80048 BASIC METABOLIC PNL TOTAL CA: CPT

## 2019-11-04 PROCEDURE — 51702 INSERT TEMP BLADDER CATH: CPT

## 2019-11-04 PROCEDURE — 84132 ASSAY OF SERUM POTASSIUM: CPT

## 2019-11-04 PROCEDURE — 2700000000 HC OXYGEN THERAPY PER DAY

## 2019-11-04 PROCEDURE — 93005 ELECTROCARDIOGRAM TRACING: CPT | Performed by: STUDENT IN AN ORGANIZED HEALTH CARE EDUCATION/TRAINING PROGRAM

## 2019-11-04 PROCEDURE — 82803 BLOOD GASES ANY COMBINATION: CPT

## 2019-11-04 PROCEDURE — 0BH18EZ INSERTION OF ENDOTRACHEAL AIRWAY INTO TRACHEA, VIA NATURAL OR ARTIFICIAL OPENING ENDOSCOPIC: ICD-10-PCS | Performed by: EMERGENCY MEDICINE

## 2019-11-04 PROCEDURE — B54BZZA ULTRASONOGRAPHY OF RIGHT LOWER EXTREMITY VEINS, GUIDANCE: ICD-10-PCS | Performed by: INTERNAL MEDICINE

## 2019-11-04 PROCEDURE — 87798 DETECT AGENT NOS DNA AMP: CPT

## 2019-11-04 PROCEDURE — 36556 INSERT NON-TUNNEL CV CATH: CPT | Performed by: INTERNAL MEDICINE

## 2019-11-04 PROCEDURE — 87486 CHLMYD PNEUM DNA AMP PROBE: CPT

## 2019-11-04 PROCEDURE — 80307 DRUG TEST PRSMV CHEM ANLYZR: CPT

## 2019-11-04 PROCEDURE — 06HM33Z INSERTION OF INFUSION DEVICE INTO RIGHT FEMORAL VEIN, PERCUTANEOUS APPROACH: ICD-10-PCS | Performed by: INTERNAL MEDICINE

## 2019-11-04 PROCEDURE — 87070 CULTURE OTHR SPECIMN AEROBIC: CPT

## 2019-11-04 RX ORDER — ALBUTEROL SULFATE 2.5 MG/3ML
5 SOLUTION RESPIRATORY (INHALATION)
Status: DISCONTINUED | OUTPATIENT
Start: 2019-11-04 | End: 2019-11-05

## 2019-11-04 RX ORDER — METHYLPREDNISOLONE SODIUM SUCCINATE 125 MG/2ML
125 INJECTION, POWDER, LYOPHILIZED, FOR SOLUTION INTRAMUSCULAR; INTRAVENOUS ONCE
Status: COMPLETED | OUTPATIENT
Start: 2019-11-04 | End: 2019-11-04

## 2019-11-04 RX ORDER — PROPOFOL 10 MG/ML
10 INJECTION, EMULSION INTRAVENOUS
Status: DISCONTINUED | OUTPATIENT
Start: 2019-11-04 | End: 2019-11-04

## 2019-11-04 RX ORDER — KETAMINE HYDROCHLORIDE 50 MG/ML
150 INJECTION, SOLUTION, CONCENTRATE INTRAMUSCULAR; INTRAVENOUS ONCE
Status: COMPLETED | OUTPATIENT
Start: 2019-11-04 | End: 2019-11-04

## 2019-11-04 RX ORDER — IPRATROPIUM BROMIDE AND ALBUTEROL SULFATE 2.5; .5 MG/3ML; MG/3ML
1 SOLUTION RESPIRATORY (INHALATION)
Status: DISCONTINUED | OUTPATIENT
Start: 2019-11-04 | End: 2019-11-05

## 2019-11-04 RX ORDER — VECURONIUM BROMIDE 1 MG/ML
INJECTION, POWDER, LYOPHILIZED, FOR SOLUTION INTRAVENOUS
Status: DISPENSED
Start: 2019-11-04 | End: 2019-11-05

## 2019-11-04 RX ORDER — KETAMINE HYDROCHLORIDE 10 MG/ML
0.25 INJECTION, SOLUTION INTRAMUSCULAR; INTRAVENOUS ONCE
Status: DISCONTINUED | OUTPATIENT
Start: 2019-11-04 | End: 2019-11-04

## 2019-11-04 RX ORDER — VECURONIUM BROMIDE 1 MG/ML
10 INJECTION, POWDER, LYOPHILIZED, FOR SOLUTION INTRAVENOUS ONCE
Status: COMPLETED | OUTPATIENT
Start: 2019-11-04 | End: 2019-11-04

## 2019-11-04 RX ORDER — ACETAMINOPHEN 325 MG/1
650 TABLET ORAL EVERY 4 HOURS PRN
Status: DISCONTINUED | OUTPATIENT
Start: 2019-11-04 | End: 2019-11-11 | Stop reason: HOSPADM

## 2019-11-04 RX ORDER — SODIUM CHLORIDE 0.9 % (FLUSH) 0.9 %
10 SYRINGE (ML) INJECTION PRN
Status: DISCONTINUED | OUTPATIENT
Start: 2019-11-04 | End: 2019-11-04

## 2019-11-04 RX ORDER — KETAMINE HYDROCHLORIDE 10 MG/ML
50 INJECTION, SOLUTION INTRAMUSCULAR; INTRAVENOUS ONCE
Status: DISCONTINUED | OUTPATIENT
Start: 2019-11-04 | End: 2019-11-04

## 2019-11-04 RX ORDER — LORAZEPAM 2 MG/ML
0.5 INJECTION INTRAMUSCULAR ONCE
Status: COMPLETED | OUTPATIENT
Start: 2019-11-04 | End: 2019-11-04

## 2019-11-04 RX ORDER — DEXAMETHASONE SODIUM PHOSPHATE 10 MG/ML
10 INJECTION INTRAMUSCULAR; INTRAVENOUS ONCE
Status: COMPLETED | OUTPATIENT
Start: 2019-11-04 | End: 2019-11-04

## 2019-11-04 RX ORDER — KETAMINE HYDROCHLORIDE 10 MG/ML
INJECTION, SOLUTION INTRAMUSCULAR; INTRAVENOUS
Status: DISPENSED
Start: 2019-11-04 | End: 2019-11-05

## 2019-11-04 RX ORDER — ONDANSETRON 2 MG/ML
4 INJECTION INTRAMUSCULAR; INTRAVENOUS EVERY 6 HOURS PRN
Status: DISCONTINUED | OUTPATIENT
Start: 2019-11-04 | End: 2019-11-11 | Stop reason: HOSPADM

## 2019-11-04 RX ORDER — METHYLPREDNISOLONE SODIUM SUCCINATE 40 MG/ML
40 INJECTION, POWDER, LYOPHILIZED, FOR SOLUTION INTRAMUSCULAR; INTRAVENOUS EVERY 6 HOURS
Status: DISCONTINUED | OUTPATIENT
Start: 2019-11-04 | End: 2019-11-11 | Stop reason: HOSPADM

## 2019-11-04 RX ORDER — ALBUTEROL SULFATE 90 UG/1
2 AEROSOL, METERED RESPIRATORY (INHALATION)
Status: DISCONTINUED | OUTPATIENT
Start: 2019-11-04 | End: 2019-11-04

## 2019-11-04 RX ORDER — TERBUTALINE SULFATE 1 MG/ML
0.25 INJECTION, SOLUTION SUBCUTANEOUS ONCE
Status: COMPLETED | OUTPATIENT
Start: 2019-11-04 | End: 2019-11-04

## 2019-11-04 RX ORDER — SODIUM CHLORIDE 9 MG/ML
10 INJECTION INTRAVENOUS DAILY
Status: DISCONTINUED | OUTPATIENT
Start: 2019-11-04 | End: 2019-11-08

## 2019-11-04 RX ORDER — FENTANYL CITRATE 50 UG/ML
50 INJECTION, SOLUTION INTRAMUSCULAR; INTRAVENOUS
Status: DISCONTINUED | OUTPATIENT
Start: 2019-11-04 | End: 2019-11-04

## 2019-11-04 RX ORDER — POTASSIUM CHLORIDE 29.8 MG/ML
40 INJECTION INTRAVENOUS ONCE
Status: COMPLETED | OUTPATIENT
Start: 2019-11-05 | End: 2019-11-04

## 2019-11-04 RX ORDER — CISATRACURIUM BESYLATE 10 MG/ML
0.15 INJECTION, SOLUTION INTRAVENOUS ONCE
Status: DISCONTINUED | OUTPATIENT
Start: 2019-11-04 | End: 2019-11-06

## 2019-11-04 RX ORDER — SODIUM CHLORIDE 0.9 % (FLUSH) 0.9 %
10 SYRINGE (ML) INJECTION PRN
Status: DISCONTINUED | OUTPATIENT
Start: 2019-11-04 | End: 2019-11-11 | Stop reason: HOSPADM

## 2019-11-04 RX ORDER — SODIUM CHLORIDE 9 MG/ML
INJECTION, SOLUTION INTRAVENOUS CONTINUOUS
Status: DISCONTINUED | OUTPATIENT
Start: 2019-11-04 | End: 2019-11-05

## 2019-11-04 RX ORDER — PROPOFOL 10 MG/ML
INJECTION, EMULSION INTRAVENOUS
Status: COMPLETED
Start: 2019-11-04 | End: 2019-11-04

## 2019-11-04 RX ORDER — SODIUM CHLORIDE 0.9 % (FLUSH) 0.9 %
10 SYRINGE (ML) INJECTION EVERY 12 HOURS SCHEDULED
Status: DISCONTINUED | OUTPATIENT
Start: 2019-11-04 | End: 2019-11-04

## 2019-11-04 RX ORDER — MAGNESIUM SULFATE 1 G/100ML
INJECTION INTRAVENOUS
Status: DISCONTINUED
Start: 2019-11-04 | End: 2019-11-05

## 2019-11-04 RX ORDER — PROPOFOL 10 MG/ML
10 INJECTION, EMULSION INTRAVENOUS
Status: DISCONTINUED | OUTPATIENT
Start: 2019-11-04 | End: 2019-11-10

## 2019-11-04 RX ORDER — MAGNESIUM SULFATE 1 G/100ML
2 INJECTION INTRAVENOUS ONCE
Status: COMPLETED | OUTPATIENT
Start: 2019-11-04 | End: 2019-11-05

## 2019-11-04 RX ORDER — ROCURONIUM BROMIDE 10 MG/ML
50 INJECTION, SOLUTION INTRAVENOUS ONCE
Status: DISCONTINUED | OUTPATIENT
Start: 2019-11-04 | End: 2019-11-04

## 2019-11-04 RX ORDER — FENTANYL CITRATE 50 UG/ML
50 INJECTION, SOLUTION INTRAMUSCULAR; INTRAVENOUS ONCE
Status: COMPLETED | OUTPATIENT
Start: 2019-11-04 | End: 2019-11-04

## 2019-11-04 RX ORDER — FENTANYL CITRATE 50 UG/ML
INJECTION, SOLUTION INTRAMUSCULAR; INTRAVENOUS
Status: COMPLETED
Start: 2019-11-04 | End: 2019-11-04

## 2019-11-04 RX ORDER — IPRATROPIUM BROMIDE AND ALBUTEROL SULFATE 2.5; .5 MG/3ML; MG/3ML
1 SOLUTION RESPIRATORY (INHALATION)
Status: DISCONTINUED | OUTPATIENT
Start: 2019-11-04 | End: 2019-11-04

## 2019-11-04 RX ORDER — PANTOPRAZOLE SODIUM 40 MG/10ML
40 INJECTION, POWDER, LYOPHILIZED, FOR SOLUTION INTRAVENOUS DAILY
Status: DISCONTINUED | OUTPATIENT
Start: 2019-11-04 | End: 2019-11-08

## 2019-11-04 RX ORDER — SODIUM CHLORIDE 0.9 % (FLUSH) 0.9 %
10 SYRINGE (ML) INJECTION EVERY 12 HOURS SCHEDULED
Status: DISCONTINUED | OUTPATIENT
Start: 2019-11-04 | End: 2019-11-11 | Stop reason: HOSPADM

## 2019-11-04 RX ADMIN — Medication 10 ML: at 20:46

## 2019-11-04 RX ADMIN — MAGNESIUM SULFATE HEPTAHYDRATE 2 G: 500 INJECTION, SOLUTION INTRAMUSCULAR; INTRAVENOUS at 14:25

## 2019-11-04 RX ADMIN — Medication 10 ML: at 20:47

## 2019-11-04 RX ADMIN — PROPOFOL 40 MCG/KG/MIN: 10 INJECTION, EMULSION INTRAVENOUS at 18:00

## 2019-11-04 RX ADMIN — METHYLPREDNISOLONE SODIUM SUCCINATE 40 MG: 40 INJECTION, POWDER, FOR SOLUTION INTRAMUSCULAR; INTRAVENOUS at 22:01

## 2019-11-04 RX ADMIN — FENTANYL CITRATE 50 MCG: 50 INJECTION, SOLUTION INTRAMUSCULAR; INTRAVENOUS at 18:02

## 2019-11-04 RX ADMIN — METHYLPREDNISOLONE SODIUM SUCCINATE 125 MG: 125 INJECTION, POWDER, FOR SOLUTION INTRAMUSCULAR; INTRAVENOUS at 16:17

## 2019-11-04 RX ADMIN — VECURONIUM BROMIDE 10 MG: 1 INJECTION, POWDER, LYOPHILIZED, FOR SOLUTION INTRAVENOUS at 15:42

## 2019-11-04 RX ADMIN — PROPOFOL 10 MCG/KG/MIN: 10 INJECTION, EMULSION INTRAVENOUS at 14:57

## 2019-11-04 RX ADMIN — ALBUTEROL SULFATE 10 MG: 5 SOLUTION RESPIRATORY (INHALATION) at 14:12

## 2019-11-04 RX ADMIN — ALBUTEROL SULFATE 10 MG: 5 SOLUTION RESPIRATORY (INHALATION) at 14:01

## 2019-11-04 RX ADMIN — KETAMINE HYDROCHLORIDE 150 MG: 50 INJECTION INTRAMUSCULAR; INTRAVENOUS at 14:50

## 2019-11-04 RX ADMIN — DEXAMETHASONE SODIUM PHOSPHATE 10 MG: 10 INJECTION INTRAMUSCULAR; INTRAVENOUS at 14:14

## 2019-11-04 RX ADMIN — SODIUM CHLORIDE: 9 INJECTION, SOLUTION INTRAVENOUS at 22:33

## 2019-11-04 RX ADMIN — ALBUTEROL SULFATE 15 MG/HR: 5 SOLUTION RESPIRATORY (INHALATION) at 22:49

## 2019-11-04 RX ADMIN — VECURONIUM BROMIDE 10 MG: 1 INJECTION, POWDER, LYOPHILIZED, FOR SOLUTION INTRAVENOUS at 16:12

## 2019-11-04 RX ADMIN — CISATRACURIUM BESYLATE 1.82 MCG/KG/MIN: 10 INJECTION, SOLUTION INTRAVENOUS at 18:21

## 2019-11-04 RX ADMIN — TERBUTALINE SULFATE 0.25 MG: 1 INJECTION SUBCUTANEOUS at 14:56

## 2019-11-04 RX ADMIN — ALBUTEROL SULFATE 15 MG/HR: 5 SOLUTION RESPIRATORY (INHALATION) at 16:30

## 2019-11-04 RX ADMIN — POTASSIUM CHLORIDE 40 MEQ: 29.8 INJECTION, SOLUTION INTRAVENOUS at 23:53

## 2019-11-04 RX ADMIN — SODIUM CHLORIDE: 9 INJECTION, SOLUTION INTRAVENOUS at 20:32

## 2019-11-04 RX ADMIN — PANTOPRAZOLE SODIUM 40 MG: 40 INJECTION, POWDER, FOR SOLUTION INTRAVENOUS at 20:46

## 2019-11-04 RX ADMIN — FENTANYL CITRATE 50 MCG: 50 INJECTION, SOLUTION INTRAMUSCULAR; INTRAVENOUS at 20:38

## 2019-11-04 RX ADMIN — FENTANYL CITRATE 50 MCG: 50 INJECTION, SOLUTION INTRAMUSCULAR; INTRAVENOUS at 21:43

## 2019-11-04 RX ADMIN — LORAZEPAM 0.5 MG: 2 INJECTION INTRAMUSCULAR at 14:13

## 2019-11-04 RX ADMIN — IPRATROPIUM BROMIDE 0.5 MG: 0.5 SOLUTION RESPIRATORY (INHALATION) at 14:01

## 2019-11-04 RX ADMIN — FENTANYL CITRATE 50 MCG: 50 INJECTION, SOLUTION INTRAMUSCULAR; INTRAVENOUS at 23:18

## 2019-11-04 RX ADMIN — ALBUTEROL SULFATE 10 MG: 5 SOLUTION RESPIRATORY (INHALATION) at 15:00

## 2019-11-04 ASSESSMENT — PAIN SCALES - GENERAL: PAINLEVEL_OUTOF10: 5

## 2019-11-04 ASSESSMENT — ENCOUNTER SYMPTOMS
VOMITING: 0
APNEA: 0
CHEST TIGHTNESS: 1
CHOKING: 0
NAUSEA: 0
FACIAL SWELLING: 0
STRIDOR: 0
ABDOMINAL PAIN: 0
BLOOD IN STOOL: 0
BACK PAIN: 0
DIARRHEA: 0
CONSTIPATION: 0
PHOTOPHOBIA: 0
COUGH: 0
WHEEZING: 1
SHORTNESS OF BREATH: 1

## 2019-11-04 ASSESSMENT — PULMONARY FUNCTION TESTS
PIF_VALUE: 34
PIF_VALUE: 36
PIF_VALUE: 60
PIF_VALUE: 35
PIF_VALUE: 35

## 2019-11-05 ENCOUNTER — APPOINTMENT (OUTPATIENT)
Dept: GENERAL RADIOLOGY | Age: 29
DRG: 130 | End: 2019-11-05
Payer: MEDICARE

## 2019-11-05 LAB
ABSOLUTE EOS #: <0.03 K/UL (ref 0–0.44)
ABSOLUTE IMMATURE GRANULOCYTE: 0.09 K/UL (ref 0–0.3)
ABSOLUTE LYMPH #: 0.76 K/UL (ref 1.1–3.7)
ABSOLUTE MONO #: 0.64 K/UL (ref 0.1–1.2)
ACTION: NORMAL
ACTION: NORMAL
ADENOVIRUS PCR: NOT DETECTED
ALLEN TEST: ABNORMAL
AMPHETAMINE SCREEN URINE: NEGATIVE
ANION GAP SERPL CALCULATED.3IONS-SCNC: 22 MMOL/L (ref 9–17)
ANION GAP SERPL CALCULATED.3IONS-SCNC: 4 MMOL/L (ref 9–17)
ANION GAP SERPL CALCULATED.3IONS-SCNC: 9 MMOL/L (ref 9–17)
ANION GAP: 0 MMOL/L (ref 7–16)
ANION GAP: 8 MMOL/L (ref 7–16)
BARBITURATE SCREEN URINE: NEGATIVE
BASOPHILS # BLD: 0 % (ref 0–2)
BASOPHILS ABSOLUTE: <0.03 K/UL (ref 0–0.2)
BENZODIAZEPINE SCREEN, URINE: NEGATIVE
BORDETELLA PARAPERTUSSIS: NOT DETECTED
BORDETELLA PERTUSSIS PCR: NOT DETECTED
BUN BLDV-MCNC: 7 MG/DL (ref 6–20)
BUN BLDV-MCNC: 8 MG/DL (ref 6–20)
BUN BLDV-MCNC: 9 MG/DL (ref 6–20)
BUN/CREAT BLD: ABNORMAL (ref 9–20)
BUPRENORPHINE URINE: ABNORMAL
CALCIUM IONIZED: 1.06 MMOL/L (ref 1.13–1.33)
CALCIUM IONIZED: 1.1 MMOL/L (ref 1.13–1.33)
CALCIUM SERPL-MCNC: 7.3 MG/DL (ref 8.6–10.4)
CALCIUM SERPL-MCNC: 7.7 MG/DL (ref 8.6–10.4)
CALCIUM SERPL-MCNC: 8.3 MG/DL (ref 8.6–10.4)
CANNABINOID SCREEN URINE: POSITIVE
CHLAMYDIA PNEUMONIAE BY PCR: NOT DETECTED
CHLORIDE BLD-SCNC: 105 MMOL/L (ref 98–107)
CHLORIDE BLD-SCNC: 110 MMOL/L (ref 98–107)
CHLORIDE BLD-SCNC: 112 MMOL/L (ref 98–107)
CO2: 14 MMOL/L (ref 20–31)
CO2: 24 MMOL/L (ref 20–31)
CO2: 28 MMOL/L (ref 20–31)
COCAINE METABOLITE, URINE: NEGATIVE
CORONAVIRUS 229E PCR: NOT DETECTED
CORONAVIRUS HKU1 PCR: NOT DETECTED
CORONAVIRUS NL63 PCR: NOT DETECTED
CORONAVIRUS OC43 PCR: NOT DETECTED
CREAT SERPL-MCNC: 0.58 MG/DL (ref 0.5–0.9)
CREAT SERPL-MCNC: 0.6 MG/DL (ref 0.5–0.9)
CREAT SERPL-MCNC: 0.69 MG/DL (ref 0.5–0.9)
DATE AND TIME: NORMAL
DATE AND TIME: NORMAL
DIFFERENTIAL TYPE: ABNORMAL
EKG ATRIAL RATE: 0 BPM
EKG ATRIAL RATE: 159 BPM
EKG Q-T INTERVAL: 0 MS
EKG Q-T INTERVAL: 294 MS
EKG QRS DURATION: 0 MS
EKG QRS DURATION: 82 MS
EKG QTC CALCULATION (BAZETT): 0 MS
EKG QTC CALCULATION (BAZETT): 418 MS
EKG R AXIS: 0 DEGREES
EKG R AXIS: 87 DEGREES
EKG T AXIS: 0 DEGREES
EKG T AXIS: 65 DEGREES
EKG VENTRICULAR RATE: 0 BPM
EKG VENTRICULAR RATE: 122 BPM
EOSINOPHILS RELATIVE PERCENT: 0 % (ref 1–4)
FIO2: 30
FIO2: 40
FIO2: 45
FIO2: 50
FIO2: 50
FIO2: ABNORMAL
GFR AFRICAN AMERICAN: >60 ML/MIN
GFR NON-AFRICAN AMERICAN: >60 ML/MIN
GFR SERPL CREATININE-BSD FRML MDRD: >60 ML/MIN
GFR SERPL CREATININE-BSD FRML MDRD: >60 ML/MIN
GFR SERPL CREATININE-BSD FRML MDRD: ABNORMAL ML/MIN/{1.73_M2}
GFR SERPL CREATININE-BSD FRML MDRD: NORMAL ML/MIN/{1.73_M2}
GFR SERPL CREATININE-BSD FRML MDRD: NORMAL ML/MIN/{1.73_M2}
GLUCOSE BLD-MCNC: 141 MG/DL (ref 65–105)
GLUCOSE BLD-MCNC: 151 MG/DL (ref 65–105)
GLUCOSE BLD-MCNC: 162 MG/DL (ref 65–105)
GLUCOSE BLD-MCNC: 166 MG/DL (ref 70–99)
GLUCOSE BLD-MCNC: 172 MG/DL (ref 65–105)
GLUCOSE BLD-MCNC: 188 MG/DL (ref 70–99)
GLUCOSE BLD-MCNC: 190 MG/DL (ref 74–100)
GLUCOSE BLD-MCNC: 202 MG/DL (ref 70–99)
GLUCOSE BLD-MCNC: 228 MG/DL (ref 74–100)
GLUCOSE BLD-MCNC: 268 MG/DL (ref 74–100)
GLUCOSE BLD-MCNC: 272 MG/DL (ref 74–100)
GLUCOSE BLD-MCNC: 366 MG/DL (ref 74–100)
HCO3 VENOUS: 24.5 MMOL/L (ref 22–29)
HCT VFR BLD CALC: 47.2 % (ref 36.3–47.1)
HEMOGLOBIN: 14.8 G/DL (ref 11.9–15.1)
HUMAN METAPNEUMOVIRUS PCR: NOT DETECTED
IMMATURE GRANULOCYTES: 0 %
INFLUENZA A BY PCR: NOT DETECTED
INFLUENZA A H1 (2009) PCR: ABNORMAL
INFLUENZA A H1 PCR: ABNORMAL
INFLUENZA A H3 PCR: ABNORMAL
INFLUENZA B BY PCR: NOT DETECTED
LACTIC ACID, WHOLE BLOOD: 2.4 MMOL/L (ref 0.7–2.1)
LACTIC ACID, WHOLE BLOOD: 7.1 MMOL/L (ref 0.7–2.1)
LYMPHOCYTES # BLD: 4 % (ref 24–43)
MAGNESIUM: 2.1 MG/DL (ref 1.6–2.6)
MAGNESIUM: 2.1 MG/DL (ref 1.6–2.6)
MCH RBC QN AUTO: 28.7 PG (ref 25.2–33.5)
MCHC RBC AUTO-ENTMCNC: 31.4 G/DL (ref 28.4–34.8)
MCV RBC AUTO: 91.7 FL (ref 82.6–102.9)
MDMA URINE: ABNORMAL
METHADONE SCREEN, URINE: NEGATIVE
METHAMPHETAMINE, URINE: ABNORMAL
MODE: ABNORMAL
MODE: AC
MONOCYTES # BLD: 3 % (ref 3–12)
MYCOPLASMA PNEUMONIAE PCR: NOT DETECTED
NEGATIVE BASE EXCESS, ART: 1 (ref 0–2)
NEGATIVE BASE EXCESS, ART: 10 (ref 0–2)
NEGATIVE BASE EXCESS, ART: 2 (ref 0–2)
NEGATIVE BASE EXCESS, ART: 3 (ref 0–2)
NEGATIVE BASE EXCESS, ART: 4 (ref 0–2)
NEGATIVE BASE EXCESS, ART: 7 (ref 0–2)
NEGATIVE BASE EXCESS, ART: 9 (ref 0–2)
NEGATIVE BASE EXCESS, ART: ABNORMAL (ref 0–2)
NEGATIVE BASE EXCESS, VEN: 6 (ref 0–2)
NOTIFY: NORMAL
NOTIFY: NORMAL
NRBC AUTOMATED: 0 PER 100 WBC
O2 DEVICE/FLOW/%: ABNORMAL
O2 SAT, VEN: 40 % (ref 60–85)
OPIATES, URINE: NEGATIVE
OXYCODONE SCREEN URINE: NEGATIVE
PARAINFLUENZA 1 PCR: NOT DETECTED
PARAINFLUENZA 2 PCR: NOT DETECTED
PARAINFLUENZA 3 PCR: NOT DETECTED
PARAINFLUENZA 4 PCR: NOT DETECTED
PATIENT TEMP: 35.9
PATIENT TEMP: 36.2
PATIENT TEMP: 36.6
PATIENT TEMP: 36.9
PATIENT TEMP: ABNORMAL
PCO2, VEN: 67.9 MM HG (ref 41–51)
PDW BLD-RTO: 12.3 % (ref 11.8–14.4)
PH VENOUS: 7.16 (ref 7.32–7.43)
PHENCYCLIDINE, URINE: NEGATIVE
PHOSPHORUS: 2.4 MG/DL (ref 2.6–4.5)
PHOSPHORUS: 2.4 MG/DL (ref 2.6–4.5)
PLATELET # BLD: 265 K/UL (ref 138–453)
PLATELET ESTIMATE: ABNORMAL
PMV BLD AUTO: 10.9 FL (ref 8.1–13.5)
PO2, VEN: 29.6 MM HG (ref 30–50)
POC CHLORIDE: 105 MMOL/L (ref 98–107)
POC CHLORIDE: 97 MMOL/L (ref 98–107)
POC CREATININE: 0.68 MG/DL (ref 0.51–1.19)
POC CREATININE: 0.73 MG/DL (ref 0.51–1.19)
POC HCO3: 21.3 MMOL/L (ref 21–28)
POC HCO3: 23.3 MMOL/L (ref 21–28)
POC HCO3: 24.6 MMOL/L (ref 21–28)
POC HCO3: 26 MMOL/L (ref 21–28)
POC HCO3: 27.4 MMOL/L (ref 21–28)
POC HCO3: 27.9 MMOL/L (ref 21–28)
POC HCO3: 28.5 MMOL/L (ref 21–28)
POC HCO3: 31.9 MMOL/L (ref 21–28)
POC HEMATOCRIT: 43 % (ref 36–46)
POC HEMATOCRIT: 45 % (ref 36–46)
POC HEMOGLOBIN: 14.7 G/DL (ref 12–16)
POC HEMOGLOBIN: 15.2 G/DL (ref 12–16)
POC IONIZED CALCIUM: 1.17 MMOL/L (ref 1.15–1.33)
POC IONIZED CALCIUM: 1.27 MMOL/L (ref 1.15–1.33)
POC LACTIC ACID: 0.47 MMOL/L (ref 0.56–1.39)
POC LACTIC ACID: 3.55 MMOL/L (ref 0.56–1.39)
POC LACTIC ACID: 4.98 MMOL/L (ref 0.56–1.39)
POC LACTIC ACID: 6.86 MMOL/L (ref 0.56–1.39)
POC LACTIC ACID: 7.72 MMOL/L (ref 0.56–1.39)
POC O2 SATURATION: 91 % (ref 94–98)
POC O2 SATURATION: 92 % (ref 94–98)
POC O2 SATURATION: 93 % (ref 94–98)
POC O2 SATURATION: 95 % (ref 94–98)
POC O2 SATURATION: 96 % (ref 94–98)
POC O2 SATURATION: 97 % (ref 94–98)
POC O2 SATURATION: 98 % (ref 94–98)
POC O2 SATURATION: 99 % (ref 94–98)
POC PCO2 TEMP: ABNORMAL MM HG
POC PCO2: 104.1 MM HG (ref 35–48)
POC PCO2: 106.6 MM HG (ref 35–48)
POC PCO2: 49.7 MM HG (ref 35–48)
POC PCO2: 60.5 MM HG (ref 35–48)
POC PCO2: 66 MM HG (ref 35–48)
POC PCO2: 67.1 MM HG (ref 35–48)
POC PCO2: 76.7 MM HG (ref 35–48)
POC PCO2: 88.7 MM HG (ref 35–48)
POC PH TEMP: ABNORMAL
POC PH: 6.97 (ref 7.35–7.45)
POC PH: 7.08 (ref 7.35–7.45)
POC PH: 7.09 (ref 7.35–7.45)
POC PH: 7.15 (ref 7.35–7.45)
POC PH: 7.16 (ref 7.35–7.45)
POC PH: 7.23 (ref 7.35–7.45)
POC PH: 7.24 (ref 7.35–7.45)
POC PH: 7.28 (ref 7.35–7.45)
POC PO2 TEMP: ABNORMAL MM HG
POC PO2: 107.3 MM HG (ref 83–108)
POC PO2: 116.7 MM HG (ref 83–108)
POC PO2: 118.2 MM HG (ref 83–108)
POC PO2: 118.3 MM HG (ref 83–108)
POC PO2: 169.2 MM HG (ref 83–108)
POC PO2: 69 MM HG (ref 83–108)
POC PO2: 79.9 MM HG (ref 83–108)
POC PO2: 97.6 MM HG (ref 83–108)
POC POTASSIUM: 3.5 MMOL/L (ref 3.5–4.5)
POC POTASSIUM: 7.4 MMOL/L (ref 3.5–4.5)
POC SODIUM: 129 MMOL/L (ref 138–146)
POC SODIUM: 137 MMOL/L (ref 138–146)
POSITIVE BASE EXCESS, ART: 0 (ref 0–3)
POSITIVE BASE EXCESS, ART: ABNORMAL (ref 0–3)
POSITIVE BASE EXCESS, VEN: ABNORMAL (ref 0–3)
POTASSIUM SERPL-SCNC: 4.6 MMOL/L (ref 3.7–5.3)
POTASSIUM SERPL-SCNC: 4.9 MMOL/L (ref 3.7–5.3)
POTASSIUM SERPL-SCNC: 7.6 MMOL/L (ref 3.7–5.3)
POTASSIUM SERPL-SCNC: 7.6 MMOL/L (ref 3.7–5.3)
PROPOXYPHENE, URINE: ABNORMAL
RBC # BLD: 5.15 M/UL (ref 3.95–5.11)
RBC # BLD: ABNORMAL 10*6/UL
READ BACK: YES
READ BACK: YES
RESP SYNCYTIAL VIRUS PCR: NOT DETECTED
RHINO/ENTEROVIRUS PCR: DETECTED
SAMPLE SITE: ABNORMAL
SEG NEUTROPHILS: 93 % (ref 36–65)
SEGMENTED NEUTROPHILS ABSOLUTE COUNT: 19.11 K/UL (ref 1.5–8.1)
SODIUM BLD-SCNC: 137 MMOL/L (ref 135–144)
SODIUM BLD-SCNC: 145 MMOL/L (ref 135–144)
SODIUM BLD-SCNC: 146 MMOL/L (ref 135–144)
SPECIMEN DESCRIPTION: ABNORMAL
TCO2 (CALC), ART: 23 MMOL/L (ref 22–29)
TCO2 (CALC), ART: 25 MMOL/L (ref 22–29)
TCO2 (CALC), ART: 28 MMOL/L (ref 22–29)
TCO2 (CALC), ART: 29 MMOL/L (ref 22–29)
TCO2 (CALC), ART: 30 MMOL/L (ref 22–29)
TCO2 (CALC), ART: 30 MMOL/L (ref 22–29)
TCO2 (CALC), ART: 31 MMOL/L (ref 22–29)
TCO2 (CALC), ART: 35 MMOL/L (ref 22–29)
TEST INFORMATION: ABNORMAL
TOTAL CO2, VENOUS: 27 MMOL/L (ref 23–30)
TRICYCLIC ANTIDEPRESSANTS, UR: ABNORMAL
TROPONIN INTERP: NORMAL
TROPONIN INTERP: NORMAL
TROPONIN T: NORMAL NG/ML
TROPONIN T: NORMAL NG/ML
TROPONIN, HIGH SENSITIVITY: 7 NG/L (ref 0–14)
TROPONIN, HIGH SENSITIVITY: <6 NG/L (ref 0–14)
WBC # BLD: 20.6 K/UL (ref 3.5–11.3)
WBC # BLD: ABNORMAL 10*3/UL

## 2019-11-05 PROCEDURE — 2700000000 HC OXYGEN THERAPY PER DAY

## 2019-11-05 PROCEDURE — 80048 BASIC METABOLIC PNL TOTAL CA: CPT

## 2019-11-05 PROCEDURE — 82330 ASSAY OF CALCIUM: CPT

## 2019-11-05 PROCEDURE — 6370000000 HC RX 637 (ALT 250 FOR IP): Performed by: STUDENT IN AN ORGANIZED HEALTH CARE EDUCATION/TRAINING PROGRAM

## 2019-11-05 PROCEDURE — 6360000002 HC RX W HCPCS: Performed by: STUDENT IN AN ORGANIZED HEALTH CARE EDUCATION/TRAINING PROGRAM

## 2019-11-05 PROCEDURE — 84484 ASSAY OF TROPONIN QUANT: CPT

## 2019-11-05 PROCEDURE — 37799 UNLISTED PX VASCULAR SURGERY: CPT

## 2019-11-05 PROCEDURE — 2580000003 HC RX 258: Performed by: STUDENT IN AN ORGANIZED HEALTH CARE EDUCATION/TRAINING PROGRAM

## 2019-11-05 PROCEDURE — 6360000002 HC RX W HCPCS: Performed by: INTERNAL MEDICINE

## 2019-11-05 PROCEDURE — C9113 INJ PANTOPRAZOLE SODIUM, VIA: HCPCS | Performed by: STUDENT IN AN ORGANIZED HEALTH CARE EDUCATION/TRAINING PROGRAM

## 2019-11-05 PROCEDURE — 2500000003 HC RX 250 WO HCPCS: Performed by: STUDENT IN AN ORGANIZED HEALTH CARE EDUCATION/TRAINING PROGRAM

## 2019-11-05 PROCEDURE — 84100 ASSAY OF PHOSPHORUS: CPT

## 2019-11-05 PROCEDURE — 2580000003 HC RX 258: Performed by: INTERNAL MEDICINE

## 2019-11-05 PROCEDURE — 94003 VENT MGMT INPAT SUBQ DAY: CPT

## 2019-11-05 PROCEDURE — 71045 X-RAY EXAM CHEST 1 VIEW: CPT

## 2019-11-05 PROCEDURE — 84295 ASSAY OF SERUM SODIUM: CPT

## 2019-11-05 PROCEDURE — 99291 CRITICAL CARE FIRST HOUR: CPT | Performed by: INTERNAL MEDICINE

## 2019-11-05 PROCEDURE — 94640 AIRWAY INHALATION TREATMENT: CPT

## 2019-11-05 PROCEDURE — 2000000000 HC ICU R&B

## 2019-11-05 PROCEDURE — 6370000000 HC RX 637 (ALT 250 FOR IP): Performed by: INTERNAL MEDICINE

## 2019-11-05 PROCEDURE — 82435 ASSAY OF BLOOD CHLORIDE: CPT

## 2019-11-05 PROCEDURE — 93005 ELECTROCARDIOGRAM TRACING: CPT | Performed by: STUDENT IN AN ORGANIZED HEALTH CARE EDUCATION/TRAINING PROGRAM

## 2019-11-05 PROCEDURE — 94761 N-INVAS EAR/PLS OXIMETRY MLT: CPT

## 2019-11-05 PROCEDURE — 85025 COMPLETE CBC W/AUTO DIFF WBC: CPT

## 2019-11-05 PROCEDURE — 94770 HC ETCO2 MONITOR DAILY: CPT

## 2019-11-05 RX ORDER — DEXTROSE MONOHYDRATE 50 MG/ML
100 INJECTION, SOLUTION INTRAVENOUS PRN
Status: DISCONTINUED | OUTPATIENT
Start: 2019-11-05 | End: 2019-11-11 | Stop reason: HOSPADM

## 2019-11-05 RX ORDER — DEXTROSE MONOHYDRATE 25 G/50ML
25 INJECTION, SOLUTION INTRAVENOUS ONCE
Status: COMPLETED | OUTPATIENT
Start: 2019-11-05 | End: 2019-11-05

## 2019-11-05 RX ORDER — MAGNESIUM SULFATE 1 G/100ML
1 INJECTION INTRAVENOUS ONCE
Status: COMPLETED | OUTPATIENT
Start: 2019-11-05 | End: 2019-11-05

## 2019-11-05 RX ORDER — ALBUTEROL SULFATE 90 UG/1
6 AEROSOL, METERED RESPIRATORY (INHALATION) EVERY 6 HOURS
Status: DISCONTINUED | OUTPATIENT
Start: 2019-11-05 | End: 2019-11-08

## 2019-11-05 RX ORDER — SODIUM BICARBONATE 42 MG/ML
5 INJECTION, SOLUTION INTRAVENOUS ONCE
Status: DISCONTINUED | OUTPATIENT
Start: 2019-11-05 | End: 2019-11-05

## 2019-11-05 RX ORDER — NICOTINE POLACRILEX 4 MG
15 LOZENGE BUCCAL PRN
Status: DISCONTINUED | OUTPATIENT
Start: 2019-11-05 | End: 2019-11-11 | Stop reason: HOSPADM

## 2019-11-05 RX ORDER — DEXTROSE AND SODIUM CHLORIDE 5; .45 G/100ML; G/100ML
INJECTION, SOLUTION INTRAVENOUS CONTINUOUS
Status: DISCONTINUED | OUTPATIENT
Start: 2019-11-05 | End: 2019-11-10

## 2019-11-05 RX ORDER — IPRATROPIUM BROMIDE AND ALBUTEROL SULFATE 2.5; .5 MG/3ML; MG/3ML
1 SOLUTION RESPIRATORY (INHALATION) EVERY 4 HOURS PRN
Status: DISCONTINUED | OUTPATIENT
Start: 2019-11-05 | End: 2019-11-05

## 2019-11-05 RX ORDER — DEXTROSE MONOHYDRATE 25 G/50ML
12.5 INJECTION, SOLUTION INTRAVENOUS PRN
Status: DISCONTINUED | OUTPATIENT
Start: 2019-11-05 | End: 2019-11-11 | Stop reason: HOSPADM

## 2019-11-05 RX ORDER — DEXTROSE, SODIUM CHLORIDE, AND POTASSIUM CHLORIDE 5; .45; .15 G/100ML; G/100ML; G/100ML
INJECTION INTRAVENOUS CONTINUOUS
Status: DISCONTINUED | OUTPATIENT
Start: 2019-11-05 | End: 2019-11-05

## 2019-11-05 RX ORDER — IPRATROPIUM BROMIDE AND ALBUTEROL SULFATE 2.5; .5 MG/3ML; MG/3ML
1 SOLUTION RESPIRATORY (INHALATION) EVERY 4 HOURS
Status: DISCONTINUED | OUTPATIENT
Start: 2019-11-05 | End: 2019-11-05

## 2019-11-05 RX ORDER — METHYLPREDNISOLONE SODIUM SUCCINATE 125 MG/2ML
125 INJECTION, POWDER, LYOPHILIZED, FOR SOLUTION INTRAMUSCULAR; INTRAVENOUS ONCE
Status: COMPLETED | OUTPATIENT
Start: 2019-11-05 | End: 2019-11-05

## 2019-11-05 RX ADMIN — IPRATROPIUM BROMIDE AND ALBUTEROL SULFATE 1 AMPULE: .5; 3 SOLUTION RESPIRATORY (INHALATION) at 09:46

## 2019-11-05 RX ADMIN — Medication 25 MCG/HR: at 00:03

## 2019-11-05 RX ADMIN — METHYLPREDNISOLONE SODIUM SUCCINATE 40 MG: 40 INJECTION, POWDER, FOR SOLUTION INTRAMUSCULAR; INTRAVENOUS at 04:12

## 2019-11-05 RX ADMIN — PROPOFOL 45 MCG/KG/MIN: 10 INJECTION, EMULSION INTRAVENOUS at 07:10

## 2019-11-05 RX ADMIN — METHYLPREDNISOLONE SODIUM SUCCINATE 40 MG: 40 INJECTION, POWDER, FOR SOLUTION INTRAMUSCULAR; INTRAVENOUS at 21:29

## 2019-11-05 RX ADMIN — POTASSIUM PHOSPHATE, MONOBASIC AND POTASSIUM PHOSPHATE, DIBASIC 15 MMOL: 224; 236 INJECTION, SOLUTION, CONCENTRATE INTRAVENOUS at 09:59

## 2019-11-05 RX ADMIN — PROPOFOL 50 MCG/KG/MIN: 10 INJECTION, EMULSION INTRAVENOUS at 21:51

## 2019-11-05 RX ADMIN — INSULIN LISPRO 2 UNITS: 100 INJECTION, SOLUTION INTRAVENOUS; SUBCUTANEOUS at 16:22

## 2019-11-05 RX ADMIN — INSULIN HUMAN 10 UNITS: 100 INJECTION, SOLUTION PARENTERAL at 22:55

## 2019-11-05 RX ADMIN — SODIUM PHOSPHATE, MONOBASIC, MONOHYDRATE 10 MMOL: 276; 142 INJECTION, SOLUTION INTRAVENOUS at 10:55

## 2019-11-05 RX ADMIN — Medication 1 G: at 22:56

## 2019-11-05 RX ADMIN — POTASSIUM CHLORIDE, DEXTROSE MONOHYDRATE AND SODIUM CHLORIDE 100 ML/HR: 150; 5; 450 INJECTION, SOLUTION INTRAVENOUS at 09:04

## 2019-11-05 RX ADMIN — PROPOFOL 40 MCG/KG/MIN: 10 INJECTION, EMULSION INTRAVENOUS at 00:04

## 2019-11-05 RX ADMIN — MAGNESIUM SULFATE HEPTAHYDRATE 1 G: 1 INJECTION, SOLUTION INTRAVENOUS at 23:03

## 2019-11-05 RX ADMIN — MAGNESIUM SULFATE HEPTAHYDRATE 2 G: 1 INJECTION, SOLUTION INTRAVENOUS at 00:07

## 2019-11-05 RX ADMIN — METHYLPREDNISOLONE SODIUM SUCCINATE 40 MG: 40 INJECTION, POWDER, FOR SOLUTION INTRAMUSCULAR; INTRAVENOUS at 15:03

## 2019-11-05 RX ADMIN — DEXTROSE AND SODIUM CHLORIDE: 5; 450 INJECTION, SOLUTION INTRAVENOUS at 11:13

## 2019-11-05 RX ADMIN — MAGNESIUM SULFATE HEPTAHYDRATE 2 G: 500 INJECTION, SOLUTION INTRAMUSCULAR; INTRAVENOUS at 12:18

## 2019-11-05 RX ADMIN — PROPOFOL 45 MCG/KG/MIN: 10 INJECTION, EMULSION INTRAVENOUS at 15:01

## 2019-11-05 RX ADMIN — PANTOPRAZOLE SODIUM 40 MG: 40 INJECTION, POWDER, FOR SOLUTION INTRAVENOUS at 09:47

## 2019-11-05 RX ADMIN — METHYLPREDNISOLONE SODIUM SUCCINATE 40 MG: 40 INJECTION, POWDER, FOR SOLUTION INTRAMUSCULAR; INTRAVENOUS at 09:05

## 2019-11-05 RX ADMIN — INSULIN LISPRO 1 UNITS: 100 INJECTION, SOLUTION INTRAVENOUS; SUBCUTANEOUS at 12:16

## 2019-11-05 RX ADMIN — INSULIN LISPRO 1 UNITS: 100 INJECTION, SOLUTION INTRAVENOUS; SUBCUTANEOUS at 21:33

## 2019-11-05 RX ADMIN — IPRATROPIUM BROMIDE 0.5 MG: 0.5 SOLUTION RESPIRATORY (INHALATION) at 16:11

## 2019-11-05 RX ADMIN — ALBUTEROL SULFATE 6 PUFF: 90 AEROSOL, METERED RESPIRATORY (INHALATION) at 22:40

## 2019-11-05 RX ADMIN — SODIUM BICARBONATE 50 MEQ: 84 INJECTION INTRAVENOUS at 22:56

## 2019-11-05 RX ADMIN — INSULIN LISPRO 1 UNITS: 100 INJECTION, SOLUTION INTRAVENOUS; SUBCUTANEOUS at 08:53

## 2019-11-05 RX ADMIN — ENOXAPARIN SODIUM 40 MG: 40 INJECTION SUBCUTANEOUS at 08:51

## 2019-11-05 RX ADMIN — CALCIUM GLUCONATE 2 G: 98 INJECTION, SOLUTION INTRAVENOUS at 18:32

## 2019-11-05 RX ADMIN — METHYLPREDNISOLONE SODIUM SUCCINATE 125 MG: 125 INJECTION, POWDER, FOR SOLUTION INTRAMUSCULAR; INTRAVENOUS at 02:37

## 2019-11-05 RX ADMIN — Medication 10 ML: at 21:29

## 2019-11-05 RX ADMIN — SODIUM BICARBONATE 50 MEQ: 84 INJECTION INTRAVENOUS at 00:26

## 2019-11-05 RX ADMIN — Medication 10 ML: at 08:50

## 2019-11-05 RX ADMIN — Medication 75 MCG/HR: at 13:41

## 2019-11-05 RX ADMIN — DEXTROSE MONOHYDRATE 25 G: 500 INJECTION PARENTERAL at 22:55

## 2019-11-05 RX ADMIN — SODIUM BICARBONATE 50 MEQ: 84 INJECTION INTRAVENOUS at 00:22

## 2019-11-05 ASSESSMENT — PULMONARY FUNCTION TESTS
PIF_VALUE: 38
PIF_VALUE: 39
PIF_VALUE: 38
PIF_VALUE: 38
PIF_VALUE: 39
PIF_VALUE: 38
PIF_VALUE: 44
PIF_VALUE: 39
PIF_VALUE: 37
PIF_VALUE: 39

## 2019-11-06 ENCOUNTER — APPOINTMENT (OUTPATIENT)
Dept: GENERAL RADIOLOGY | Age: 29
DRG: 130 | End: 2019-11-06
Payer: MEDICARE

## 2019-11-06 LAB
ABSOLUTE EOS #: 0 K/UL (ref 0–0.44)
ABSOLUTE IMMATURE GRANULOCYTE: 0.28 K/UL (ref 0–0.3)
ABSOLUTE LYMPH #: 1.69 K/UL (ref 1.1–3.7)
ABSOLUTE MONO #: 2.25 K/UL (ref 0.1–1.2)
ALLEN TEST: ABNORMAL
ANION GAP SERPL CALCULATED.3IONS-SCNC: 4 MMOL/L (ref 9–17)
ANION GAP SERPL CALCULATED.3IONS-SCNC: 6 MMOL/L (ref 9–17)
BASOPHILS # BLD: 0 % (ref 0–2)
BASOPHILS ABSOLUTE: 0 K/UL (ref 0–0.2)
BUN BLDV-MCNC: 9 MG/DL (ref 6–20)
BUN BLDV-MCNC: 9 MG/DL (ref 6–20)
BUN/CREAT BLD: ABNORMAL (ref 9–20)
BUN/CREAT BLD: ABNORMAL (ref 9–20)
CALCIUM SERPL-MCNC: 8.1 MG/DL (ref 8.6–10.4)
CALCIUM SERPL-MCNC: 8.1 MG/DL (ref 8.6–10.4)
CHLORIDE BLD-SCNC: 100 MMOL/L (ref 98–107)
CHLORIDE BLD-SCNC: 101 MMOL/L (ref 98–107)
CO2: 30 MMOL/L (ref 20–31)
CO2: 32 MMOL/L (ref 20–31)
CREAT SERPL-MCNC: 0.44 MG/DL (ref 0.5–0.9)
CREAT SERPL-MCNC: 0.49 MG/DL (ref 0.5–0.9)
CULTURE: NORMAL
DIFFERENTIAL TYPE: ABNORMAL
DIRECT EXAM: NORMAL
EKG ATRIAL RATE: 101 BPM
EKG P AXIS: 84 DEGREES
EKG P-R INTERVAL: 130 MS
EKG Q-T INTERVAL: 322 MS
EKG QRS DURATION: 76 MS
EKG QTC CALCULATION (BAZETT): 417 MS
EKG R AXIS: 67 DEGREES
EKG T AXIS: 70 DEGREES
EKG VENTRICULAR RATE: 101 BPM
EOSINOPHILS RELATIVE PERCENT: 0 % (ref 1–4)
FIO2: 35
GFR AFRICAN AMERICAN: >60 ML/MIN
GFR AFRICAN AMERICAN: >60 ML/MIN
GFR NON-AFRICAN AMERICAN: >60 ML/MIN
GFR NON-AFRICAN AMERICAN: >60 ML/MIN
GFR SERPL CREATININE-BSD FRML MDRD: ABNORMAL ML/MIN/{1.73_M2}
GLUCOSE BLD-MCNC: 118 MG/DL (ref 65–105)
GLUCOSE BLD-MCNC: 149 MG/DL (ref 65–105)
GLUCOSE BLD-MCNC: 160 MG/DL (ref 65–105)
GLUCOSE BLD-MCNC: 161 MG/DL (ref 70–99)
GLUCOSE BLD-MCNC: 177 MG/DL (ref 70–99)
HCT VFR BLD CALC: 39.2 % (ref 36.3–47.1)
HEMOGLOBIN: 12.3 G/DL (ref 11.9–15.1)
IMMATURE GRANULOCYTES: 1 %
LYMPHOCYTES # BLD: 6 % (ref 24–43)
Lab: NORMAL
MAGNESIUM: 2.1 MG/DL (ref 1.6–2.6)
MCH RBC QN AUTO: 29 PG (ref 25.2–33.5)
MCHC RBC AUTO-ENTMCNC: 31.4 G/DL (ref 28.4–34.8)
MCV RBC AUTO: 92.5 FL (ref 82.6–102.9)
MODE: ABNORMAL
MONOCYTES # BLD: 8 % (ref 3–12)
MORPHOLOGY: NORMAL
MYOGLOBIN: 350 NG/ML (ref 25–58)
NEGATIVE BASE EXCESS, ART: ABNORMAL (ref 0–2)
NRBC AUTOMATED: 0 PER 100 WBC
O2 DEVICE/FLOW/%: ABNORMAL
PATIENT TEMP: ABNORMAL
PDW BLD-RTO: 12.2 % (ref 11.8–14.4)
PHOSPHORUS: 1.6 MG/DL (ref 2.6–4.5)
PHOSPHORUS: 2.8 MG/DL (ref 2.6–4.5)
PLATELET # BLD: 226 K/UL (ref 138–453)
PLATELET ESTIMATE: ABNORMAL
PMV BLD AUTO: 10.2 FL (ref 8.1–13.5)
POC HCO3: 32.8 MMOL/L (ref 21–28)
POC HCO3: 34.6 MMOL/L (ref 21–28)
POC HCO3: 35.1 MMOL/L (ref 21–28)
POC O2 SATURATION: 91 % (ref 94–98)
POC O2 SATURATION: 93 % (ref 94–98)
POC O2 SATURATION: 93 % (ref 94–98)
POC PCO2 TEMP: ABNORMAL MM HG
POC PCO2: 70.6 MM HG (ref 35–48)
POC PCO2: 73.7 MM HG (ref 35–48)
POC PCO2: 79.2 MM HG (ref 35–48)
POC PH TEMP: ABNORMAL
POC PH: 7.25 (ref 7.35–7.45)
POC PH: 7.26 (ref 7.35–7.45)
POC PH: 7.3 (ref 7.35–7.45)
POC PO2 TEMP: ABNORMAL MM HG
POC PO2: 74.9 MM HG (ref 83–108)
POC PO2: 78.2 MM HG (ref 83–108)
POC PO2: 82 MM HG (ref 83–108)
POSITIVE BASE EXCESS, ART: 4 (ref 0–3)
POSITIVE BASE EXCESS, ART: 5 (ref 0–3)
POSITIVE BASE EXCESS, ART: 7 (ref 0–3)
POTASSIUM SERPL-SCNC: 4.2 MMOL/L (ref 3.7–5.3)
POTASSIUM SERPL-SCNC: 4.9 MMOL/L (ref 3.7–5.3)
POTASSIUM SERPL-SCNC: 5.5 MMOL/L (ref 3.7–5.3)
POTASSIUM SERPL-SCNC: 5.6 MMOL/L (ref 3.7–5.3)
RBC # BLD: 4.24 M/UL (ref 3.95–5.11)
RBC # BLD: ABNORMAL 10*6/UL
SAMPLE SITE: ABNORMAL
SEG NEUTROPHILS: 85 % (ref 36–65)
SEGMENTED NEUTROPHILS ABSOLUTE COUNT: 23.88 K/UL (ref 1.5–8.1)
SODIUM BLD-SCNC: 135 MMOL/L (ref 135–144)
SODIUM BLD-SCNC: 138 MMOL/L (ref 135–144)
SPECIMEN DESCRIPTION: NORMAL
TCO2 (CALC), ART: 35 MMOL/L (ref 22–29)
TCO2 (CALC), ART: 37 MMOL/L (ref 22–29)
TCO2 (CALC), ART: 37 MMOL/L (ref 22–29)
TOTAL CK: 1267 U/L (ref 26–192)
WBC # BLD: 28.1 K/UL (ref 3.5–11.3)
WBC # BLD: ABNORMAL 10*3/UL

## 2019-11-06 PROCEDURE — 89220 SPUTUM SPECIMEN COLLECTION: CPT

## 2019-11-06 PROCEDURE — 85025 COMPLETE CBC W/AUTO DIFF WBC: CPT

## 2019-11-06 PROCEDURE — 2000000000 HC ICU R&B

## 2019-11-06 PROCEDURE — 94003 VENT MGMT INPAT SUBQ DAY: CPT

## 2019-11-06 PROCEDURE — 93010 ELECTROCARDIOGRAM REPORT: CPT | Performed by: INTERNAL MEDICINE

## 2019-11-06 PROCEDURE — 93005 ELECTROCARDIOGRAM TRACING: CPT | Performed by: STUDENT IN AN ORGANIZED HEALTH CARE EDUCATION/TRAINING PROGRAM

## 2019-11-06 PROCEDURE — 6360000002 HC RX W HCPCS: Performed by: STUDENT IN AN ORGANIZED HEALTH CARE EDUCATION/TRAINING PROGRAM

## 2019-11-06 PROCEDURE — 2580000003 HC RX 258: Performed by: STUDENT IN AN ORGANIZED HEALTH CARE EDUCATION/TRAINING PROGRAM

## 2019-11-06 PROCEDURE — 82803 BLOOD GASES ANY COMBINATION: CPT

## 2019-11-06 PROCEDURE — 84100 ASSAY OF PHOSPHORUS: CPT

## 2019-11-06 PROCEDURE — 94761 N-INVAS EAR/PLS OXIMETRY MLT: CPT

## 2019-11-06 PROCEDURE — 99291 CRITICAL CARE FIRST HOUR: CPT | Performed by: INTERNAL MEDICINE

## 2019-11-06 PROCEDURE — 71045 X-RAY EXAM CHEST 1 VIEW: CPT

## 2019-11-06 PROCEDURE — 80048 BASIC METABOLIC PNL TOTAL CA: CPT

## 2019-11-06 PROCEDURE — 2500000003 HC RX 250 WO HCPCS: Performed by: INTERNAL MEDICINE

## 2019-11-06 PROCEDURE — 36620 INSERTION CATHETER ARTERY: CPT

## 2019-11-06 PROCEDURE — 6370000000 HC RX 637 (ALT 250 FOR IP): Performed by: INTERNAL MEDICINE

## 2019-11-06 PROCEDURE — 82550 ASSAY OF CK (CPK): CPT

## 2019-11-06 PROCEDURE — 2580000003 HC RX 258: Performed by: INTERNAL MEDICINE

## 2019-11-06 PROCEDURE — 87070 CULTURE OTHR SPECIMN AEROBIC: CPT

## 2019-11-06 PROCEDURE — 6370000000 HC RX 637 (ALT 250 FOR IP): Performed by: STUDENT IN AN ORGANIZED HEALTH CARE EDUCATION/TRAINING PROGRAM

## 2019-11-06 PROCEDURE — 83874 ASSAY OF MYOGLOBIN: CPT

## 2019-11-06 PROCEDURE — 87205 SMEAR GRAM STAIN: CPT

## 2019-11-06 PROCEDURE — 87185 SC STD ENZYME DETCJ PER NZM: CPT

## 2019-11-06 PROCEDURE — 82947 ASSAY GLUCOSE BLOOD QUANT: CPT

## 2019-11-06 PROCEDURE — 37799 UNLISTED PX VASCULAR SURGERY: CPT

## 2019-11-06 PROCEDURE — 84132 ASSAY OF SERUM POTASSIUM: CPT

## 2019-11-06 PROCEDURE — 2700000000 HC OXYGEN THERAPY PER DAY

## 2019-11-06 PROCEDURE — 94640 AIRWAY INHALATION TREATMENT: CPT

## 2019-11-06 PROCEDURE — 83735 ASSAY OF MAGNESIUM: CPT

## 2019-11-06 PROCEDURE — 2500000003 HC RX 250 WO HCPCS: Performed by: STUDENT IN AN ORGANIZED HEALTH CARE EDUCATION/TRAINING PROGRAM

## 2019-11-06 PROCEDURE — 94770 HC ETCO2 MONITOR DAILY: CPT

## 2019-11-06 PROCEDURE — C9113 INJ PANTOPRAZOLE SODIUM, VIA: HCPCS | Performed by: STUDENT IN AN ORGANIZED HEALTH CARE EDUCATION/TRAINING PROGRAM

## 2019-11-06 RX ORDER — DEXTROSE MONOHYDRATE 25 G/50ML
25 INJECTION, SOLUTION INTRAVENOUS ONCE
Status: COMPLETED | OUTPATIENT
Start: 2019-11-06 | End: 2019-11-06

## 2019-11-06 RX ADMIN — INSULIN LISPRO 1 UNITS: 100 INJECTION, SOLUTION INTRAVENOUS; SUBCUTANEOUS at 08:38

## 2019-11-06 RX ADMIN — ENOXAPARIN SODIUM 40 MG: 40 INJECTION SUBCUTANEOUS at 08:51

## 2019-11-06 RX ADMIN — DEXTROSE MONOHYDRATE 25 G: 500 INJECTION PARENTERAL at 01:45

## 2019-11-06 RX ADMIN — PROPOFOL 40 MCG/KG/MIN: 10 INJECTION, EMULSION INTRAVENOUS at 11:53

## 2019-11-06 RX ADMIN — METHYLPREDNISOLONE SODIUM SUCCINATE 40 MG: 40 INJECTION, POWDER, FOR SOLUTION INTRAMUSCULAR; INTRAVENOUS at 05:34

## 2019-11-06 RX ADMIN — CEFTRIAXONE SODIUM 1 G: 1 INJECTION, POWDER, FOR SOLUTION INTRAMUSCULAR; INTRAVENOUS at 15:57

## 2019-11-06 RX ADMIN — SODIUM PHOSPHATE, MONOBASIC, MONOHYDRATE 10 MMOL: 276; 142 INJECTION, SOLUTION INTRAVENOUS at 08:47

## 2019-11-06 RX ADMIN — Medication 75 MCG/HR: at 02:47

## 2019-11-06 RX ADMIN — ALBUTEROL SULFATE 6 PUFF: 90 AEROSOL, METERED RESPIRATORY (INHALATION) at 04:28

## 2019-11-06 RX ADMIN — ALBUTEROL SULFATE 6 PUFF: 90 AEROSOL, METERED RESPIRATORY (INHALATION) at 15:24

## 2019-11-06 RX ADMIN — ALBUTEROL SULFATE 6 PUFF: 90 AEROSOL, METERED RESPIRATORY (INHALATION) at 22:32

## 2019-11-06 RX ADMIN — INSULIN HUMAN 10 UNITS: 100 INJECTION, SOLUTION PARENTERAL at 16:19

## 2019-11-06 RX ADMIN — IPRATROPIUM BROMIDE 6 PUFF: 17 AEROSOL, METERED RESPIRATORY (INHALATION) at 15:13

## 2019-11-06 RX ADMIN — IPRATROPIUM BROMIDE 6 PUFF: 17 AEROSOL, METERED RESPIRATORY (INHALATION) at 22:00

## 2019-11-06 RX ADMIN — PANTOPRAZOLE SODIUM 40 MG: 40 INJECTION, POWDER, FOR SOLUTION INTRAVENOUS at 08:51

## 2019-11-06 RX ADMIN — Medication 10 ML: at 21:08

## 2019-11-06 RX ADMIN — DEXTROSE MONOHYDRATE 25 G: 500 INJECTION PARENTERAL at 16:20

## 2019-11-06 RX ADMIN — Medication 10 ML: at 08:51

## 2019-11-06 RX ADMIN — Medication 75 MCG/HR: at 16:00

## 2019-11-06 RX ADMIN — CISATRACURIUM BESYLATE 1.5 MCG/KG/MIN: 10 INJECTION, SOLUTION INTRAVENOUS at 09:30

## 2019-11-06 RX ADMIN — INSULIN HUMAN 10 UNITS: 100 INJECTION, SOLUTION PARENTERAL at 01:46

## 2019-11-06 RX ADMIN — PROPOFOL 40 MCG/KG/MIN: 10 INJECTION, EMULSION INTRAVENOUS at 20:02

## 2019-11-06 RX ADMIN — METHYLPREDNISOLONE SODIUM SUCCINATE 40 MG: 40 INJECTION, POWDER, FOR SOLUTION INTRAMUSCULAR; INTRAVENOUS at 16:20

## 2019-11-06 RX ADMIN — INSULIN LISPRO 1 UNITS: 100 INJECTION, SOLUTION INTRAVENOUS; SUBCUTANEOUS at 12:12

## 2019-11-06 RX ADMIN — METHYLPREDNISOLONE SODIUM SUCCINATE 40 MG: 40 INJECTION, POWDER, FOR SOLUTION INTRAMUSCULAR; INTRAVENOUS at 09:18

## 2019-11-06 RX ADMIN — AZITHROMYCIN MONOHYDRATE 500 MG: 500 INJECTION, POWDER, LYOPHILIZED, FOR SOLUTION INTRAVENOUS at 17:11

## 2019-11-06 RX ADMIN — ALBUTEROL SULFATE 6 PUFF: 90 AEROSOL, METERED RESPIRATORY (INHALATION) at 08:35

## 2019-11-06 RX ADMIN — Medication 10 ML: at 09:18

## 2019-11-06 RX ADMIN — METHYLPREDNISOLONE SODIUM SUCCINATE 40 MG: 40 INJECTION, POWDER, FOR SOLUTION INTRAMUSCULAR; INTRAVENOUS at 21:08

## 2019-11-06 ASSESSMENT — PULMONARY FUNCTION TESTS
PIF_VALUE: 38
PIF_VALUE: 37
PIF_VALUE: 38

## 2019-11-07 ENCOUNTER — APPOINTMENT (OUTPATIENT)
Dept: GENERAL RADIOLOGY | Age: 29
DRG: 130 | End: 2019-11-07
Payer: MEDICARE

## 2019-11-07 LAB
ABSOLUTE EOS #: 0 K/UL (ref 0–0.44)
ABSOLUTE IMMATURE GRANULOCYTE: 0.2 K/UL (ref 0–0.3)
ABSOLUTE LYMPH #: 1.42 K/UL (ref 1.1–3.7)
ABSOLUTE MONO #: 1.62 K/UL (ref 0.1–1.2)
ALLEN TEST: ABNORMAL
ANION GAP SERPL CALCULATED.3IONS-SCNC: 13 MMOL/L (ref 9–17)
BASOPHILS # BLD: 0 % (ref 0–2)
BASOPHILS ABSOLUTE: 0 K/UL (ref 0–0.2)
BUN BLDV-MCNC: 13 MG/DL (ref 6–20)
BUN/CREAT BLD: ABNORMAL (ref 9–20)
CALCIUM SERPL-MCNC: 8.1 MG/DL (ref 8.6–10.4)
CHLORIDE BLD-SCNC: 102 MMOL/L (ref 98–107)
CO2: 29 MMOL/L (ref 20–31)
CREAT SERPL-MCNC: 0.39 MG/DL (ref 0.5–0.9)
DIFFERENTIAL TYPE: ABNORMAL
EKG ATRIAL RATE: 88 BPM
EKG P AXIS: 82 DEGREES
EKG P-R INTERVAL: 94 MS
EKG Q-T INTERVAL: 352 MS
EKG QRS DURATION: 80 MS
EKG QTC CALCULATION (BAZETT): 425 MS
EKG R AXIS: 78 DEGREES
EKG T AXIS: 60 DEGREES
EKG VENTRICULAR RATE: 88 BPM
EOSINOPHILS RELATIVE PERCENT: 0 % (ref 1–4)
FIO2: 35
GFR AFRICAN AMERICAN: >60 ML/MIN
GFR NON-AFRICAN AMERICAN: >60 ML/MIN
GFR SERPL CREATININE-BSD FRML MDRD: ABNORMAL ML/MIN/{1.73_M2}
GFR SERPL CREATININE-BSD FRML MDRD: ABNORMAL ML/MIN/{1.73_M2}
GLUCOSE BLD-MCNC: 134 MG/DL (ref 65–105)
GLUCOSE BLD-MCNC: 145 MG/DL (ref 65–105)
GLUCOSE BLD-MCNC: 146 MG/DL (ref 65–105)
GLUCOSE BLD-MCNC: 156 MG/DL (ref 65–105)
GLUCOSE BLD-MCNC: 195 MG/DL (ref 70–99)
HCT VFR BLD CALC: 34.9 % (ref 36.3–47.1)
HEMOGLOBIN: 11.1 G/DL (ref 11.9–15.1)
IMMATURE GRANULOCYTES: 1 %
LYMPHOCYTES # BLD: 7 % (ref 24–43)
MCH RBC QN AUTO: 29.3 PG (ref 25.2–33.5)
MCHC RBC AUTO-ENTMCNC: 31.8 G/DL (ref 28.4–34.8)
MCV RBC AUTO: 92.1 FL (ref 82.6–102.9)
MODE: ABNORMAL
MONOCYTES # BLD: 8 % (ref 3–12)
MORPHOLOGY: NORMAL
MYOGLOBIN: 109 NG/ML (ref 25–58)
NEGATIVE BASE EXCESS, ART: ABNORMAL (ref 0–2)
NRBC AUTOMATED: 0 PER 100 WBC
O2 DEVICE/FLOW/%: ABNORMAL
PATIENT TEMP: ABNORMAL
PDW BLD-RTO: 12.2 % (ref 11.8–14.4)
PHOSPHORUS: 0.8 MG/DL (ref 2.6–4.5)
PHOSPHORUS: 1.9 MG/DL (ref 2.6–4.5)
PLATELET # BLD: 198 K/UL (ref 138–453)
PLATELET ESTIMATE: ABNORMAL
PMV BLD AUTO: 10.4 FL (ref 8.1–13.5)
POC HCO3: 33.2 MMOL/L (ref 21–28)
POC HCO3: 35.5 MMOL/L (ref 21–28)
POC HCO3: 35.5 MMOL/L (ref 21–28)
POC O2 SATURATION: 95 % (ref 94–98)
POC O2 SATURATION: 96 % (ref 94–98)
POC O2 SATURATION: 97 % (ref 94–98)
POC PCO2 TEMP: ABNORMAL MM HG
POC PCO2: 48.7 MM HG (ref 35–48)
POC PCO2: 50.7 MM HG (ref 35–48)
POC PCO2: 55.1 MM HG (ref 35–48)
POC PH TEMP: ABNORMAL
POC PH: 7.42 (ref 7.35–7.45)
POC PH: 7.42 (ref 7.35–7.45)
POC PH: 7.47 (ref 7.35–7.45)
POC PO2 TEMP: ABNORMAL MM HG
POC PO2: 79.1 MM HG (ref 83–108)
POC PO2: 81.4 MM HG (ref 83–108)
POC PO2: 90.6 MM HG (ref 83–108)
POSITIVE BASE EXCESS, ART: 10 (ref 0–3)
POSITIVE BASE EXCESS, ART: 8 (ref 0–3)
POSITIVE BASE EXCESS, ART: 9 (ref 0–3)
POTASSIUM SERPL-SCNC: 3.7 MMOL/L (ref 3.7–5.3)
RBC # BLD: 3.79 M/UL (ref 3.95–5.11)
RBC # BLD: ABNORMAL 10*6/UL
SAMPLE SITE: ABNORMAL
SEG NEUTROPHILS: 84 % (ref 36–65)
SEGMENTED NEUTROPHILS ABSOLUTE COUNT: 17.06 K/UL (ref 1.5–8.1)
SODIUM BLD-SCNC: 144 MMOL/L (ref 135–144)
TCO2 (CALC), ART: 35 MMOL/L (ref 22–29)
TCO2 (CALC), ART: 37 MMOL/L (ref 22–29)
TCO2 (CALC), ART: 37 MMOL/L (ref 22–29)
TOTAL CK: 982 U/L (ref 26–192)
WBC # BLD: 20.3 K/UL (ref 3.5–11.3)
WBC # BLD: ABNORMAL 10*3/UL

## 2019-11-07 PROCEDURE — 94003 VENT MGMT INPAT SUBQ DAY: CPT

## 2019-11-07 PROCEDURE — 83874 ASSAY OF MYOGLOBIN: CPT

## 2019-11-07 PROCEDURE — 80048 BASIC METABOLIC PNL TOTAL CA: CPT

## 2019-11-07 PROCEDURE — 6360000002 HC RX W HCPCS: Performed by: STUDENT IN AN ORGANIZED HEALTH CARE EDUCATION/TRAINING PROGRAM

## 2019-11-07 PROCEDURE — 82803 BLOOD GASES ANY COMBINATION: CPT

## 2019-11-07 PROCEDURE — 99291 CRITICAL CARE FIRST HOUR: CPT | Performed by: INTERNAL MEDICINE

## 2019-11-07 PROCEDURE — 2500000003 HC RX 250 WO HCPCS: Performed by: INTERNAL MEDICINE

## 2019-11-07 PROCEDURE — 6370000000 HC RX 637 (ALT 250 FOR IP): Performed by: STUDENT IN AN ORGANIZED HEALTH CARE EDUCATION/TRAINING PROGRAM

## 2019-11-07 PROCEDURE — 2000000000 HC ICU R&B

## 2019-11-07 PROCEDURE — 93010 ELECTROCARDIOGRAM REPORT: CPT | Performed by: INTERNAL MEDICINE

## 2019-11-07 PROCEDURE — 94770 HC ETCO2 MONITOR DAILY: CPT

## 2019-11-07 PROCEDURE — 82947 ASSAY GLUCOSE BLOOD QUANT: CPT

## 2019-11-07 PROCEDURE — C9113 INJ PANTOPRAZOLE SODIUM, VIA: HCPCS | Performed by: STUDENT IN AN ORGANIZED HEALTH CARE EDUCATION/TRAINING PROGRAM

## 2019-11-07 PROCEDURE — 82550 ASSAY OF CK (CPK): CPT

## 2019-11-07 PROCEDURE — 2500000003 HC RX 250 WO HCPCS: Performed by: STUDENT IN AN ORGANIZED HEALTH CARE EDUCATION/TRAINING PROGRAM

## 2019-11-07 PROCEDURE — 84100 ASSAY OF PHOSPHORUS: CPT

## 2019-11-07 PROCEDURE — 94761 N-INVAS EAR/PLS OXIMETRY MLT: CPT

## 2019-11-07 PROCEDURE — 2580000003 HC RX 258: Performed by: STUDENT IN AN ORGANIZED HEALTH CARE EDUCATION/TRAINING PROGRAM

## 2019-11-07 PROCEDURE — 2700000000 HC OXYGEN THERAPY PER DAY

## 2019-11-07 PROCEDURE — 37799 UNLISTED PX VASCULAR SURGERY: CPT

## 2019-11-07 PROCEDURE — 71045 X-RAY EXAM CHEST 1 VIEW: CPT

## 2019-11-07 PROCEDURE — 94640 AIRWAY INHALATION TREATMENT: CPT

## 2019-11-07 PROCEDURE — 2580000003 HC RX 258: Performed by: INTERNAL MEDICINE

## 2019-11-07 PROCEDURE — 85025 COMPLETE CBC W/AUTO DIFF WBC: CPT

## 2019-11-07 RX ORDER — LABETALOL HYDROCHLORIDE 5 MG/ML
20 INJECTION, SOLUTION INTRAVENOUS
Status: DISCONTINUED | OUTPATIENT
Start: 2019-11-07 | End: 2019-11-09

## 2019-11-07 RX ORDER — BISACODYL 10 MG
10 SUPPOSITORY, RECTAL RECTAL DAILY PRN
Status: DISCONTINUED | OUTPATIENT
Start: 2019-11-07 | End: 2019-11-11 | Stop reason: HOSPADM

## 2019-11-07 RX ADMIN — PROPOFOL 50 MCG/KG/MIN: 10 INJECTION, EMULSION INTRAVENOUS at 21:08

## 2019-11-07 RX ADMIN — IPRATROPIUM BROMIDE 6 PUFF: 17 AEROSOL, METERED RESPIRATORY (INHALATION) at 14:12

## 2019-11-07 RX ADMIN — IPRATROPIUM BROMIDE 6 PUFF: 17 AEROSOL, METERED RESPIRATORY (INHALATION) at 20:18

## 2019-11-07 RX ADMIN — ALBUTEROL SULFATE 6 PUFF: 90 AEROSOL, METERED RESPIRATORY (INHALATION) at 14:32

## 2019-11-07 RX ADMIN — SODIUM PHOSPHATE, MONOBASIC, MONOHYDRATE 30 MMOL: 276; 142 INJECTION, SOLUTION INTRAVENOUS at 20:58

## 2019-11-07 RX ADMIN — CEFTRIAXONE SODIUM 1 G: 1 INJECTION, POWDER, FOR SOLUTION INTRAMUSCULAR; INTRAVENOUS at 15:17

## 2019-11-07 RX ADMIN — METHYLPREDNISOLONE SODIUM SUCCINATE 40 MG: 40 INJECTION, POWDER, FOR SOLUTION INTRAMUSCULAR; INTRAVENOUS at 15:52

## 2019-11-07 RX ADMIN — Medication 20 MG: at 22:55

## 2019-11-07 RX ADMIN — METHYLPREDNISOLONE SODIUM SUCCINATE 40 MG: 40 INJECTION, POWDER, FOR SOLUTION INTRAMUSCULAR; INTRAVENOUS at 21:02

## 2019-11-07 RX ADMIN — PROPOFOL 40 MCG/KG/MIN: 10 INJECTION, EMULSION INTRAVENOUS at 05:06

## 2019-11-07 RX ADMIN — ALBUTEROL SULFATE 6 PUFF: 90 AEROSOL, METERED RESPIRATORY (INHALATION) at 20:18

## 2019-11-07 RX ADMIN — ALBUTEROL SULFATE 6 PUFF: 90 AEROSOL, METERED RESPIRATORY (INHALATION) at 08:30

## 2019-11-07 RX ADMIN — IPRATROPIUM BROMIDE 6 PUFF: 17 AEROSOL, METERED RESPIRATORY (INHALATION) at 08:30

## 2019-11-07 RX ADMIN — AZITHROMYCIN MONOHYDRATE 500 MG: 500 INJECTION, POWDER, LYOPHILIZED, FOR SOLUTION INTRAVENOUS at 14:34

## 2019-11-07 RX ADMIN — METHYLPREDNISOLONE SODIUM SUCCINATE 40 MG: 40 INJECTION, POWDER, FOR SOLUTION INTRAMUSCULAR; INTRAVENOUS at 08:38

## 2019-11-07 RX ADMIN — ALBUTEROL SULFATE 6 PUFF: 90 AEROSOL, METERED RESPIRATORY (INHALATION) at 03:53

## 2019-11-07 RX ADMIN — INSULIN LISPRO 1 UNITS: 100 INJECTION, SOLUTION INTRAVENOUS; SUBCUTANEOUS at 00:28

## 2019-11-07 RX ADMIN — IPRATROPIUM BROMIDE 6 PUFF: 17 AEROSOL, METERED RESPIRATORY (INHALATION) at 03:53

## 2019-11-07 RX ADMIN — PANTOPRAZOLE SODIUM 40 MG: 40 INJECTION, POWDER, FOR SOLUTION INTRAVENOUS at 08:38

## 2019-11-07 RX ADMIN — SODIUM PHOSPHATE, MONOBASIC, MONOHYDRATE 20 MMOL: 276; 142 INJECTION, SOLUTION INTRAVENOUS at 09:57

## 2019-11-07 RX ADMIN — Medication 10 ML: at 08:38

## 2019-11-07 RX ADMIN — ENOXAPARIN SODIUM 40 MG: 40 INJECTION SUBCUTANEOUS at 08:38

## 2019-11-07 RX ADMIN — Medication 10 ML: at 21:01

## 2019-11-07 RX ADMIN — Medication 75 MCG/HR: at 15:17

## 2019-11-07 RX ADMIN — PROPOFOL 30 MCG/KG/MIN: 10 INJECTION, EMULSION INTRAVENOUS at 15:18

## 2019-11-07 RX ADMIN — INSULIN LISPRO 1 UNITS: 100 INJECTION, SOLUTION INTRAVENOUS; SUBCUTANEOUS at 18:22

## 2019-11-07 RX ADMIN — Medication 75 MCG/HR: at 04:04

## 2019-11-07 RX ADMIN — INSULIN LISPRO 1 UNITS: 100 INJECTION, SOLUTION INTRAVENOUS; SUBCUTANEOUS at 13:19

## 2019-11-07 ASSESSMENT — PULMONARY FUNCTION TESTS
PIF_VALUE: 33
PIF_VALUE: 37
PIF_VALUE: 35
PIF_VALUE: 33
PIF_VALUE: 38
PIF_VALUE: 36
PIF_VALUE: 36
PIF_VALUE: 37
PIF_VALUE: 33
PIF_VALUE: 34
PIF_VALUE: 35
PIF_VALUE: 33
PIF_VALUE: 36
PIF_VALUE: 36
PIF_VALUE: 37
PIF_VALUE: 34
PIF_VALUE: 35

## 2019-11-08 ENCOUNTER — APPOINTMENT (OUTPATIENT)
Dept: GENERAL RADIOLOGY | Age: 29
DRG: 130 | End: 2019-11-08
Payer: MEDICARE

## 2019-11-08 LAB
ABSOLUTE EOS #: <0.03 K/UL (ref 0–0.44)
ABSOLUTE IMMATURE GRANULOCYTE: 0.1 K/UL (ref 0–0.3)
ABSOLUTE LYMPH #: 0.86 K/UL (ref 1.1–3.7)
ABSOLUTE MONO #: 0.98 K/UL (ref 0.1–1.2)
ALBUMIN SERPL-MCNC: 3.3 G/DL (ref 3.5–5.2)
ALBUMIN/GLOBULIN RATIO: 1.1 (ref 1–2.5)
ALLEN TEST: POSITIVE
ALP BLD-CCNC: 39 U/L (ref 35–104)
ALT SERPL-CCNC: 23 U/L (ref 5–33)
ANION GAP SERPL CALCULATED.3IONS-SCNC: 7 MMOL/L (ref 9–17)
ANION GAP SERPL CALCULATED.3IONS-SCNC: 9 MMOL/L (ref 9–17)
AST SERPL-CCNC: 30 U/L
BASOPHILS # BLD: 0 % (ref 0–2)
BASOPHILS ABSOLUTE: <0.03 K/UL (ref 0–0.2)
BILIRUB SERPL-MCNC: <0.1 MG/DL (ref 0.3–1.2)
BILIRUBIN DIRECT: <0.08 MG/DL
BILIRUBIN, INDIRECT: ABNORMAL MG/DL (ref 0–1)
BUN BLDV-MCNC: 13 MG/DL (ref 6–20)
BUN BLDV-MCNC: 15 MG/DL (ref 6–20)
BUN/CREAT BLD: ABNORMAL (ref 9–20)
BUN/CREAT BLD: ABNORMAL (ref 9–20)
CALCIUM SERPL-MCNC: 8.2 MG/DL (ref 8.6–10.4)
CALCIUM SERPL-MCNC: 8.3 MG/DL (ref 8.6–10.4)
CHLORIDE BLD-SCNC: 103 MMOL/L (ref 98–107)
CHLORIDE BLD-SCNC: 105 MMOL/L (ref 98–107)
CO2: 31 MMOL/L (ref 20–31)
CO2: 31 MMOL/L (ref 20–31)
CREAT SERPL-MCNC: 0.37 MG/DL (ref 0.5–0.9)
CREAT SERPL-MCNC: 0.42 MG/DL (ref 0.5–0.9)
CULTURE: ABNORMAL
CULTURE: ABNORMAL
DIFFERENTIAL TYPE: ABNORMAL
DIRECT EXAM: ABNORMAL
EOSINOPHILS RELATIVE PERCENT: 0 % (ref 1–4)
FIO2: 35
GFR AFRICAN AMERICAN: >60 ML/MIN
GFR AFRICAN AMERICAN: >60 ML/MIN
GFR NON-AFRICAN AMERICAN: >60 ML/MIN
GFR NON-AFRICAN AMERICAN: >60 ML/MIN
GFR SERPL CREATININE-BSD FRML MDRD: ABNORMAL ML/MIN/{1.73_M2}
GLOBULIN: ABNORMAL G/DL (ref 1.5–3.8)
GLUCOSE BLD-MCNC: 138 MG/DL (ref 65–105)
GLUCOSE BLD-MCNC: 141 MG/DL (ref 65–105)
GLUCOSE BLD-MCNC: 150 MG/DL (ref 65–105)
GLUCOSE BLD-MCNC: 152 MG/DL (ref 65–105)
GLUCOSE BLD-MCNC: 158 MG/DL (ref 65–105)
GLUCOSE BLD-MCNC: 158 MG/DL (ref 70–99)
GLUCOSE BLD-MCNC: 160 MG/DL (ref 70–99)
HCT VFR BLD CALC: 35.9 % (ref 36.3–47.1)
HEMOGLOBIN: 11.3 G/DL (ref 11.9–15.1)
IMMATURE GRANULOCYTES: 1 %
LV EF: 52 %
LVEF MODALITY: NORMAL
LYMPHOCYTES # BLD: 5 % (ref 24–43)
Lab: ABNORMAL
MCH RBC QN AUTO: 28.9 PG (ref 25.2–33.5)
MCHC RBC AUTO-ENTMCNC: 31.5 G/DL (ref 28.4–34.8)
MCV RBC AUTO: 91.8 FL (ref 82.6–102.9)
MODE: ABNORMAL
MONOCYTES # BLD: 6 % (ref 3–12)
MYOGLOBIN: 192 NG/ML (ref 25–58)
NEGATIVE BASE EXCESS, ART: ABNORMAL (ref 0–2)
NRBC AUTOMATED: 0 PER 100 WBC
O2 DEVICE/FLOW/%: ABNORMAL
PATIENT TEMP: ABNORMAL
PDW BLD-RTO: 12.7 % (ref 11.8–14.4)
PHOSPHORUS: 3 MG/DL (ref 2.6–4.5)
PLATELET # BLD: 203 K/UL (ref 138–453)
PLATELET ESTIMATE: ABNORMAL
PMV BLD AUTO: 10.1 FL (ref 8.1–13.5)
POC HCO3: 37.5 MMOL/L (ref 21–28)
POC O2 SATURATION: 96 % (ref 94–98)
POC PCO2 TEMP: ABNORMAL MM HG
POC PCO2: 66 MM HG (ref 35–48)
POC PH TEMP: ABNORMAL
POC PH: 7.36 (ref 7.35–7.45)
POC PO2 TEMP: ABNORMAL MM HG
POC PO2: 88 MM HG (ref 83–108)
POSITIVE BASE EXCESS, ART: 10 (ref 0–3)
POTASSIUM SERPL-SCNC: 3.9 MMOL/L (ref 3.7–5.3)
POTASSIUM SERPL-SCNC: 4.2 MMOL/L (ref 3.7–5.3)
RBC # BLD: 3.91 M/UL (ref 3.95–5.11)
RBC # BLD: ABNORMAL 10*6/UL
SAMPLE SITE: ABNORMAL
SEG NEUTROPHILS: 88 % (ref 36–65)
SEGMENTED NEUTROPHILS ABSOLUTE COUNT: 15.94 K/UL (ref 1.5–8.1)
SODIUM BLD-SCNC: 141 MMOL/L (ref 135–144)
SODIUM BLD-SCNC: 145 MMOL/L (ref 135–144)
SPECIMEN DESCRIPTION: ABNORMAL
TCO2 (CALC), ART: 40 MMOL/L (ref 22–29)
TOTAL PROTEIN: 6.2 G/DL (ref 6.4–8.3)
TRIGL SERPL-MCNC: 48 MG/DL
WBC # BLD: 17.9 K/UL (ref 3.5–11.3)
WBC # BLD: ABNORMAL 10*3/UL

## 2019-11-08 PROCEDURE — 6360000002 HC RX W HCPCS: Performed by: STUDENT IN AN ORGANIZED HEALTH CARE EDUCATION/TRAINING PROGRAM

## 2019-11-08 PROCEDURE — 2580000003 HC RX 258: Performed by: STUDENT IN AN ORGANIZED HEALTH CARE EDUCATION/TRAINING PROGRAM

## 2019-11-08 PROCEDURE — 84478 ASSAY OF TRIGLYCERIDES: CPT

## 2019-11-08 PROCEDURE — 85025 COMPLETE CBC W/AUTO DIFF WBC: CPT

## 2019-11-08 PROCEDURE — 82947 ASSAY GLUCOSE BLOOD QUANT: CPT

## 2019-11-08 PROCEDURE — 99291 CRITICAL CARE FIRST HOUR: CPT | Performed by: INTERNAL MEDICINE

## 2019-11-08 PROCEDURE — C9113 INJ PANTOPRAZOLE SODIUM, VIA: HCPCS | Performed by: STUDENT IN AN ORGANIZED HEALTH CARE EDUCATION/TRAINING PROGRAM

## 2019-11-08 PROCEDURE — 6370000000 HC RX 637 (ALT 250 FOR IP): Performed by: STUDENT IN AN ORGANIZED HEALTH CARE EDUCATION/TRAINING PROGRAM

## 2019-11-08 PROCEDURE — 2700000000 HC OXYGEN THERAPY PER DAY

## 2019-11-08 PROCEDURE — 94761 N-INVAS EAR/PLS OXIMETRY MLT: CPT

## 2019-11-08 PROCEDURE — 94770 HC ETCO2 MONITOR DAILY: CPT

## 2019-11-08 PROCEDURE — 37799 UNLISTED PX VASCULAR SURGERY: CPT

## 2019-11-08 PROCEDURE — 94640 AIRWAY INHALATION TREATMENT: CPT

## 2019-11-08 PROCEDURE — 83874 ASSAY OF MYOGLOBIN: CPT

## 2019-11-08 PROCEDURE — 80076 HEPATIC FUNCTION PANEL: CPT

## 2019-11-08 PROCEDURE — 82803 BLOOD GASES ANY COMBINATION: CPT

## 2019-11-08 PROCEDURE — 93005 ELECTROCARDIOGRAM TRACING: CPT | Performed by: STUDENT IN AN ORGANIZED HEALTH CARE EDUCATION/TRAINING PROGRAM

## 2019-11-08 PROCEDURE — 84100 ASSAY OF PHOSPHORUS: CPT

## 2019-11-08 PROCEDURE — 2580000003 HC RX 258: Performed by: INTERNAL MEDICINE

## 2019-11-08 PROCEDURE — 94003 VENT MGMT INPAT SUBQ DAY: CPT

## 2019-11-08 PROCEDURE — 71045 X-RAY EXAM CHEST 1 VIEW: CPT

## 2019-11-08 PROCEDURE — 2000000000 HC ICU R&B

## 2019-11-08 PROCEDURE — 93306 TTE W/DOPPLER COMPLETE: CPT

## 2019-11-08 PROCEDURE — 80048 BASIC METABOLIC PNL TOTAL CA: CPT

## 2019-11-08 RX ORDER — ALBUTEROL SULFATE 90 UG/1
6 AEROSOL, METERED RESPIRATORY (INHALATION) EVERY 4 HOURS
Status: DISCONTINUED | OUTPATIENT
Start: 2019-11-08 | End: 2019-11-11

## 2019-11-08 RX ORDER — ALBUTEROL SULFATE 90 UG/1
6 AEROSOL, METERED RESPIRATORY (INHALATION) EVERY 4 HOURS PRN
Status: DISCONTINUED | OUTPATIENT
Start: 2019-11-08 | End: 2019-11-11

## 2019-11-08 RX ADMIN — Medication 10 ML: at 08:38

## 2019-11-08 RX ADMIN — METHYLPREDNISOLONE SODIUM SUCCINATE 40 MG: 40 INJECTION, POWDER, FOR SOLUTION INTRAMUSCULAR; INTRAVENOUS at 08:42

## 2019-11-08 RX ADMIN — ALBUTEROL SULFATE 6 PUFF: 90 AEROSOL, METERED RESPIRATORY (INHALATION) at 04:42

## 2019-11-08 RX ADMIN — ALBUTEROL SULFATE 6 PUFF: 90 AEROSOL, METERED RESPIRATORY (INHALATION) at 00:21

## 2019-11-08 RX ADMIN — IPRATROPIUM BROMIDE 6 PUFF: 17 AEROSOL, METERED RESPIRATORY (INHALATION) at 16:11

## 2019-11-08 RX ADMIN — INSULIN LISPRO 1 UNITS: 100 INJECTION, SOLUTION INTRAVENOUS; SUBCUTANEOUS at 00:51

## 2019-11-08 RX ADMIN — ALBUTEROL SULFATE 6 PUFF: 90 AEROSOL, METERED RESPIRATORY (INHALATION) at 11:53

## 2019-11-08 RX ADMIN — DOCUSATE SODIUM 100 MG: 50 LIQUID ORAL at 10:43

## 2019-11-08 RX ADMIN — METHYLPREDNISOLONE SODIUM SUCCINATE 40 MG: 40 INJECTION, POWDER, FOR SOLUTION INTRAMUSCULAR; INTRAVENOUS at 20:55

## 2019-11-08 RX ADMIN — PANTOPRAZOLE SODIUM 40 MG: 40 INJECTION, POWDER, FOR SOLUTION INTRAVENOUS at 10:43

## 2019-11-08 RX ADMIN — ALBUTEROL SULFATE 6 PUFF: 90 AEROSOL, METERED RESPIRATORY (INHALATION) at 08:33

## 2019-11-08 RX ADMIN — Medication 10 ML: at 20:55

## 2019-11-08 RX ADMIN — Medication 75 MCG/HR: at 16:35

## 2019-11-08 RX ADMIN — PROPOFOL 20 MCG/KG/MIN: 10 INJECTION, EMULSION INTRAVENOUS at 16:34

## 2019-11-08 RX ADMIN — ALBUTEROL SULFATE 6 PUFF: 90 AEROSOL, METERED RESPIRATORY (INHALATION) at 16:11

## 2019-11-08 RX ADMIN — Medication 75 MCG/HR: at 03:51

## 2019-11-08 RX ADMIN — ENOXAPARIN SODIUM 40 MG: 40 INJECTION SUBCUTANEOUS at 08:43

## 2019-11-08 RX ADMIN — DEXTROSE AND SODIUM CHLORIDE: 5; 450 INJECTION, SOLUTION INTRAVENOUS at 10:36

## 2019-11-08 RX ADMIN — Medication 10 ML: at 10:43

## 2019-11-08 RX ADMIN — CEFTRIAXONE SODIUM 1 G: 1 INJECTION, POWDER, FOR SOLUTION INTRAMUSCULAR; INTRAVENOUS at 15:23

## 2019-11-08 RX ADMIN — ALBUTEROL SULFATE 6 PUFF: 90 AEROSOL, METERED RESPIRATORY (INHALATION) at 20:36

## 2019-11-08 RX ADMIN — IPRATROPIUM BROMIDE 6 PUFF: 17 AEROSOL, METERED RESPIRATORY (INHALATION) at 20:35

## 2019-11-08 RX ADMIN — IPRATROPIUM BROMIDE 6 PUFF: 17 AEROSOL, METERED RESPIRATORY (INHALATION) at 11:53

## 2019-11-08 RX ADMIN — METHYLPREDNISOLONE SODIUM SUCCINATE 40 MG: 40 INJECTION, POWDER, FOR SOLUTION INTRAMUSCULAR; INTRAVENOUS at 15:24

## 2019-11-08 RX ADMIN — INSULIN LISPRO 1 UNITS: 100 INJECTION, SOLUTION INTRAVENOUS; SUBCUTANEOUS at 05:48

## 2019-11-08 RX ADMIN — IPRATROPIUM BROMIDE 6 PUFF: 17 AEROSOL, METERED RESPIRATORY (INHALATION) at 08:33

## 2019-11-08 RX ADMIN — AZITHROMYCIN MONOHYDRATE 500 MG: 500 INJECTION, POWDER, LYOPHILIZED, FOR SOLUTION INTRAVENOUS at 16:24

## 2019-11-08 RX ADMIN — PROPOFOL 50 MCG/KG/MIN: 10 INJECTION, EMULSION INTRAVENOUS at 03:52

## 2019-11-08 RX ADMIN — METHYLPREDNISOLONE SODIUM SUCCINATE 40 MG: 40 INJECTION, POWDER, FOR SOLUTION INTRAMUSCULAR; INTRAVENOUS at 03:44

## 2019-11-08 ASSESSMENT — PULMONARY FUNCTION TESTS
PIF_VALUE: 34
PIF_VALUE: 33
PIF_VALUE: 33
PIF_VALUE: 32
PIF_VALUE: 34
PIF_VALUE: 34
PIF_VALUE: 33
PIF_VALUE: 33
PIF_VALUE: 34
PIF_VALUE: 33

## 2019-11-09 ENCOUNTER — APPOINTMENT (OUTPATIENT)
Dept: GENERAL RADIOLOGY | Age: 29
DRG: 130 | End: 2019-11-09
Payer: MEDICARE

## 2019-11-09 LAB
ABSOLUTE EOS #: <0.03 K/UL (ref 0–0.44)
ABSOLUTE IMMATURE GRANULOCYTE: 0.24 K/UL (ref 0–0.3)
ABSOLUTE LYMPH #: 1.45 K/UL (ref 1.1–3.7)
ABSOLUTE MONO #: 1.32 K/UL (ref 0.1–1.2)
ALLEN TEST: ABNORMAL
ANION GAP SERPL CALCULATED.3IONS-SCNC: 10 MMOL/L (ref 9–17)
ANION GAP SERPL CALCULATED.3IONS-SCNC: 9 MMOL/L (ref 9–17)
BASOPHILS # BLD: 0 % (ref 0–2)
BASOPHILS ABSOLUTE: 0.03 K/UL (ref 0–0.2)
BUN BLDV-MCNC: 14 MG/DL (ref 6–20)
BUN BLDV-MCNC: 15 MG/DL (ref 6–20)
BUN/CREAT BLD: ABNORMAL (ref 9–20)
BUN/CREAT BLD: ABNORMAL (ref 9–20)
CALCIUM SERPL-MCNC: 8.6 MG/DL (ref 8.6–10.4)
CALCIUM SERPL-MCNC: 8.7 MG/DL (ref 8.6–10.4)
CHLORIDE BLD-SCNC: 100 MMOL/L (ref 98–107)
CHLORIDE BLD-SCNC: 105 MMOL/L (ref 98–107)
CO2: 27 MMOL/L (ref 20–31)
CO2: 28 MMOL/L (ref 20–31)
CREAT SERPL-MCNC: 0.3 MG/DL (ref 0.5–0.9)
CREAT SERPL-MCNC: 0.33 MG/DL (ref 0.5–0.9)
DIFFERENTIAL TYPE: ABNORMAL
EKG ATRIAL RATE: 70 BPM
EKG P AXIS: 79 DEGREES
EKG P-R INTERVAL: 94 MS
EKG Q-T INTERVAL: 380 MS
EKG QRS DURATION: 84 MS
EKG QTC CALCULATION (BAZETT): 410 MS
EKG R AXIS: 81 DEGREES
EKG T AXIS: 70 DEGREES
EKG VENTRICULAR RATE: 70 BPM
EOSINOPHILS RELATIVE PERCENT: 0 % (ref 1–4)
FIO2: 35
GFR AFRICAN AMERICAN: >60 ML/MIN
GFR AFRICAN AMERICAN: >60 ML/MIN
GFR NON-AFRICAN AMERICAN: >60 ML/MIN
GFR NON-AFRICAN AMERICAN: >60 ML/MIN
GFR SERPL CREATININE-BSD FRML MDRD: ABNORMAL ML/MIN/{1.73_M2}
GLUCOSE BLD-MCNC: 136 MG/DL (ref 65–105)
GLUCOSE BLD-MCNC: 141 MG/DL (ref 70–99)
GLUCOSE BLD-MCNC: 144 MG/DL (ref 65–105)
GLUCOSE BLD-MCNC: 145 MG/DL (ref 65–105)
GLUCOSE BLD-MCNC: 147 MG/DL (ref 65–105)
GLUCOSE BLD-MCNC: 151 MG/DL (ref 70–99)
HCT VFR BLD CALC: 34 % (ref 36.3–47.1)
HEMOGLOBIN: 11.1 G/DL (ref 11.9–15.1)
IMMATURE GRANULOCYTES: 1 %
LYMPHOCYTES # BLD: 9 % (ref 24–43)
MCH RBC QN AUTO: 29.3 PG (ref 25.2–33.5)
MCHC RBC AUTO-ENTMCNC: 32.6 G/DL (ref 28.4–34.8)
MCV RBC AUTO: 89.7 FL (ref 82.6–102.9)
MODE: ABNORMAL
MONOCYTES # BLD: 8 % (ref 3–12)
MYOGLOBIN: 64 NG/ML (ref 25–58)
NEGATIVE BASE EXCESS, ART: ABNORMAL (ref 0–2)
NRBC AUTOMATED: 0 PER 100 WBC
O2 DEVICE/FLOW/%: ABNORMAL
PATIENT TEMP: ABNORMAL
PDW BLD-RTO: 12.7 % (ref 11.8–14.4)
PHOSPHORUS: 1.9 MG/DL (ref 2.6–4.5)
PLATELET # BLD: 209 K/UL (ref 138–453)
PLATELET ESTIMATE: ABNORMAL
PMV BLD AUTO: 10.7 FL (ref 8.1–13.5)
POC HCO3: 34.3 MMOL/L (ref 21–28)
POC O2 SATURATION: 98 % (ref 94–98)
POC PCO2 TEMP: ABNORMAL MM HG
POC PCO2: 52.5 MM HG (ref 35–48)
POC PH TEMP: ABNORMAL
POC PH: 7.42 (ref 7.35–7.45)
POC PO2 TEMP: ABNORMAL MM HG
POC PO2: 104.7 MM HG (ref 83–108)
POSITIVE BASE EXCESS, ART: 8 (ref 0–3)
POTASSIUM SERPL-SCNC: 3.9 MMOL/L (ref 3.7–5.3)
POTASSIUM SERPL-SCNC: 4.3 MMOL/L (ref 3.7–5.3)
RBC # BLD: 3.79 M/UL (ref 3.95–5.11)
RBC # BLD: ABNORMAL 10*6/UL
SAMPLE SITE: ABNORMAL
SEG NEUTROPHILS: 82 % (ref 36–65)
SEGMENTED NEUTROPHILS ABSOLUTE COUNT: 13.94 K/UL (ref 1.5–8.1)
SODIUM BLD-SCNC: 138 MMOL/L (ref 135–144)
SODIUM BLD-SCNC: 141 MMOL/L (ref 135–144)
TCO2 (CALC), ART: 36 MMOL/L (ref 22–29)
WBC # BLD: 17 K/UL (ref 3.5–11.3)
WBC # BLD: ABNORMAL 10*3/UL

## 2019-11-09 PROCEDURE — 82947 ASSAY GLUCOSE BLOOD QUANT: CPT

## 2019-11-09 PROCEDURE — 2580000003 HC RX 258: Performed by: STUDENT IN AN ORGANIZED HEALTH CARE EDUCATION/TRAINING PROGRAM

## 2019-11-09 PROCEDURE — 6370000000 HC RX 637 (ALT 250 FOR IP): Performed by: STUDENT IN AN ORGANIZED HEALTH CARE EDUCATION/TRAINING PROGRAM

## 2019-11-09 PROCEDURE — 94761 N-INVAS EAR/PLS OXIMETRY MLT: CPT

## 2019-11-09 PROCEDURE — 2500000003 HC RX 250 WO HCPCS: Performed by: STUDENT IN AN ORGANIZED HEALTH CARE EDUCATION/TRAINING PROGRAM

## 2019-11-09 PROCEDURE — 94770 HC ETCO2 MONITOR DAILY: CPT

## 2019-11-09 PROCEDURE — 99291 CRITICAL CARE FIRST HOUR: CPT | Performed by: INTERNAL MEDICINE

## 2019-11-09 PROCEDURE — 82803 BLOOD GASES ANY COMBINATION: CPT

## 2019-11-09 PROCEDURE — 6360000002 HC RX W HCPCS: Performed by: STUDENT IN AN ORGANIZED HEALTH CARE EDUCATION/TRAINING PROGRAM

## 2019-11-09 PROCEDURE — 84100 ASSAY OF PHOSPHORUS: CPT

## 2019-11-09 PROCEDURE — 37799 UNLISTED PX VASCULAR SURGERY: CPT

## 2019-11-09 PROCEDURE — 36415 COLL VENOUS BLD VENIPUNCTURE: CPT

## 2019-11-09 PROCEDURE — 94640 AIRWAY INHALATION TREATMENT: CPT

## 2019-11-09 PROCEDURE — 71045 X-RAY EXAM CHEST 1 VIEW: CPT

## 2019-11-09 PROCEDURE — 2700000000 HC OXYGEN THERAPY PER DAY

## 2019-11-09 PROCEDURE — 2000000000 HC ICU R&B

## 2019-11-09 PROCEDURE — 83874 ASSAY OF MYOGLOBIN: CPT

## 2019-11-09 PROCEDURE — 80048 BASIC METABOLIC PNL TOTAL CA: CPT

## 2019-11-09 PROCEDURE — 94003 VENT MGMT INPAT SUBQ DAY: CPT

## 2019-11-09 PROCEDURE — 85025 COMPLETE CBC W/AUTO DIFF WBC: CPT

## 2019-11-09 RX ORDER — METOPROLOL TARTRATE 5 MG/5ML
2.5 INJECTION INTRAVENOUS EVERY 6 HOURS PRN
Status: DISCONTINUED | OUTPATIENT
Start: 2019-11-09 | End: 2019-11-11 | Stop reason: HOSPADM

## 2019-11-09 RX ORDER — METOPROLOL TARTRATE 5 MG/5ML
2.5 INJECTION INTRAVENOUS EVERY 6 HOURS
Status: DISCONTINUED | OUTPATIENT
Start: 2019-11-09 | End: 2019-11-09

## 2019-11-09 RX ADMIN — Medication 10 ML: at 08:50

## 2019-11-09 RX ADMIN — INSULIN LISPRO 1 UNITS: 100 INJECTION, SOLUTION INTRAVENOUS; SUBCUTANEOUS at 05:30

## 2019-11-09 RX ADMIN — METHYLPREDNISOLONE SODIUM SUCCINATE 40 MG: 40 INJECTION, POWDER, FOR SOLUTION INTRAMUSCULAR; INTRAVENOUS at 20:18

## 2019-11-09 RX ADMIN — ALBUTEROL SULFATE 6 PUFF: 90 AEROSOL, METERED RESPIRATORY (INHALATION) at 15:53

## 2019-11-09 RX ADMIN — METHYLPREDNISOLONE SODIUM SUCCINATE 40 MG: 40 INJECTION, POWDER, FOR SOLUTION INTRAMUSCULAR; INTRAVENOUS at 14:37

## 2019-11-09 RX ADMIN — ENOXAPARIN SODIUM 40 MG: 40 INJECTION SUBCUTANEOUS at 08:50

## 2019-11-09 RX ADMIN — INSULIN LISPRO 1 UNITS: 100 INJECTION, SOLUTION INTRAVENOUS; SUBCUTANEOUS at 00:35

## 2019-11-09 RX ADMIN — MAGNESIUM HYDROXIDE 30 ML: 400 SUSPENSION ORAL at 16:22

## 2019-11-09 RX ADMIN — HYPROMELLOSE 2910 1 DROP: 5 SOLUTION OPHTHALMIC at 08:50

## 2019-11-09 RX ADMIN — METHYLPREDNISOLONE SODIUM SUCCINATE 40 MG: 40 INJECTION, POWDER, FOR SOLUTION INTRAMUSCULAR; INTRAVENOUS at 08:50

## 2019-11-09 RX ADMIN — IPRATROPIUM BROMIDE 6 PUFF: 17 AEROSOL, METERED RESPIRATORY (INHALATION) at 11:59

## 2019-11-09 RX ADMIN — ALBUTEROL SULFATE 6 PUFF: 90 AEROSOL, METERED RESPIRATORY (INHALATION) at 23:43

## 2019-11-09 RX ADMIN — ALBUTEROL SULFATE 6 PUFF: 90 AEROSOL, METERED RESPIRATORY (INHALATION) at 19:55

## 2019-11-09 RX ADMIN — SODIUM PHOSPHATE, MONOBASIC, MONOHYDRATE 20 MMOL: 276; 142 INJECTION, SOLUTION INTRAVENOUS at 15:56

## 2019-11-09 RX ADMIN — CEFTRIAXONE SODIUM 1 G: 1 INJECTION, POWDER, FOR SOLUTION INTRAMUSCULAR; INTRAVENOUS at 14:49

## 2019-11-09 RX ADMIN — FAMOTIDINE 20 MG: 10 INJECTION, SOLUTION INTRAVENOUS at 08:51

## 2019-11-09 RX ADMIN — INSULIN LISPRO 1 UNITS: 100 INJECTION, SOLUTION INTRAVENOUS; SUBCUTANEOUS at 12:06

## 2019-11-09 RX ADMIN — DOCUSATE SODIUM 100 MG: 50 LIQUID ORAL at 08:51

## 2019-11-09 RX ADMIN — PROPOFOL 30 MCG/KG/MIN: 10 INJECTION, EMULSION INTRAVENOUS at 05:12

## 2019-11-09 RX ADMIN — METHYLPREDNISOLONE SODIUM SUCCINATE 40 MG: 40 INJECTION, POWDER, FOR SOLUTION INTRAMUSCULAR; INTRAVENOUS at 05:33

## 2019-11-09 RX ADMIN — ALBUTEROL SULFATE 6 PUFF: 90 AEROSOL, METERED RESPIRATORY (INHALATION) at 04:33

## 2019-11-09 RX ADMIN — PROPOFOL 20 MCG/KG/MIN: 10 INJECTION, EMULSION INTRAVENOUS at 15:57

## 2019-11-09 RX ADMIN — FAMOTIDINE 20 MG: 10 INJECTION, SOLUTION INTRAVENOUS at 20:18

## 2019-11-09 RX ADMIN — ALBUTEROL SULFATE 6 PUFF: 90 AEROSOL, METERED RESPIRATORY (INHALATION) at 08:56

## 2019-11-09 RX ADMIN — IPRATROPIUM BROMIDE 6 PUFF: 17 AEROSOL, METERED RESPIRATORY (INHALATION) at 08:56

## 2019-11-09 RX ADMIN — Medication 10 MG: at 10:10

## 2019-11-09 RX ADMIN — ALBUTEROL SULFATE 6 PUFF: 90 AEROSOL, METERED RESPIRATORY (INHALATION) at 11:59

## 2019-11-09 RX ADMIN — Medication 100 MCG/HR: at 12:32

## 2019-11-09 RX ADMIN — IPRATROPIUM BROMIDE 6 PUFF: 17 AEROSOL, METERED RESPIRATORY (INHALATION) at 19:55

## 2019-11-09 RX ADMIN — Medication 100 MCG/HR: at 03:01

## 2019-11-09 RX ADMIN — AZITHROMYCIN MONOHYDRATE 500 MG: 500 INJECTION, POWDER, LYOPHILIZED, FOR SOLUTION INTRAVENOUS at 14:49

## 2019-11-09 RX ADMIN — Medication 10 ML: at 20:18

## 2019-11-09 RX ADMIN — IPRATROPIUM BROMIDE 6 PUFF: 17 AEROSOL, METERED RESPIRATORY (INHALATION) at 15:54

## 2019-11-09 ASSESSMENT — PULMONARY FUNCTION TESTS
PIF_VALUE: 21
PIF_VALUE: 32
PIF_VALUE: 36
PIF_VALUE: 36
PIF_VALUE: 32
PIF_VALUE: 33
PIF_VALUE: 36
PIF_VALUE: 35
PIF_VALUE: 33
PIF_VALUE: 34
PIF_VALUE: 32
PIF_VALUE: 35
PIF_VALUE: 32
PIF_VALUE: 33
PIF_VALUE: 30

## 2019-11-10 ENCOUNTER — APPOINTMENT (OUTPATIENT)
Dept: GENERAL RADIOLOGY | Age: 29
DRG: 130 | End: 2019-11-10
Payer: MEDICARE

## 2019-11-10 LAB
-: NORMAL
ABSOLUTE EOS #: 0 K/UL (ref 0–0.44)
ABSOLUTE IMMATURE GRANULOCYTE: 0.29 K/UL (ref 0–0.3)
ABSOLUTE LYMPH #: 1.62 K/UL (ref 1.1–3.7)
ABSOLUTE MONO #: 1.32 K/UL (ref 0.1–1.2)
ALLEN TEST: POSITIVE
BASOPHILS # BLD: 1 % (ref 0–2)
BASOPHILS ABSOLUTE: 0.15 K/UL (ref 0–0.2)
DIFFERENTIAL TYPE: ABNORMAL
EOSINOPHILS RELATIVE PERCENT: 0 % (ref 1–4)
FIO2: 35
GLUCOSE BLD-MCNC: 129 MG/DL (ref 65–105)
GLUCOSE BLD-MCNC: 146 MG/DL (ref 65–105)
GLUCOSE BLD-MCNC: 154 MG/DL (ref 74–100)
HCT VFR BLD CALC: 32.1 % (ref 36.3–47.1)
HEMOGLOBIN: 10.4 G/DL (ref 11.9–15.1)
IMMATURE GRANULOCYTES: 2 %
LYMPHOCYTES # BLD: 11 % (ref 24–43)
MCH RBC QN AUTO: 29.3 PG (ref 25.2–33.5)
MCHC RBC AUTO-ENTMCNC: 32.4 G/DL (ref 28.4–34.8)
MCV RBC AUTO: 90.4 FL (ref 82.6–102.9)
MODE: ABNORMAL
MONOCYTES # BLD: 9 % (ref 3–12)
MORPHOLOGY: NORMAL
MYOGLOBIN: 99 NG/ML (ref 25–58)
NEGATIVE BASE EXCESS, ART: ABNORMAL (ref 0–2)
NRBC AUTOMATED: 0 PER 100 WBC
O2 DEVICE/FLOW/%: ABNORMAL
PATIENT TEMP: ABNORMAL
PDW BLD-RTO: 12.6 % (ref 11.8–14.4)
PHOSPHORUS: 2.2 MG/DL (ref 2.6–4.5)
PLATELET # BLD: 274 K/UL (ref 138–453)
PLATELET ESTIMATE: ABNORMAL
PMV BLD AUTO: 10.9 FL (ref 8.1–13.5)
POC HCO3: 30 MMOL/L (ref 21–28)
POC O2 SATURATION: 96 % (ref 94–98)
POC PCO2 TEMP: ABNORMAL MM HG
POC PCO2: 39.9 MM HG (ref 35–48)
POC PH TEMP: ABNORMAL
POC PH: 7.49 (ref 7.35–7.45)
POC PO2 TEMP: ABNORMAL MM HG
POC PO2: 76.1 MM HG (ref 83–108)
POSITIVE BASE EXCESS, ART: 6 (ref 0–3)
RBC # BLD: 3.55 M/UL (ref 3.95–5.11)
RBC # BLD: ABNORMAL 10*6/UL
REASON FOR REJECTION: NORMAL
SAMPLE SITE: ABNORMAL
SEG NEUTROPHILS: 77 % (ref 36–65)
SEGMENTED NEUTROPHILS ABSOLUTE COUNT: 11.32 K/UL (ref 1.5–8.1)
TCO2 (CALC), ART: 31 MMOL/L (ref 22–29)
WBC # BLD: 14.7 K/UL (ref 3.5–11.3)
WBC # BLD: ABNORMAL 10*3/UL
ZZ NTE CLEAN UP: ORDERED TEST: NORMAL
ZZ NTE WITH NAME CLEAN UP: SPECIMEN SOURCE: NORMAL

## 2019-11-10 PROCEDURE — 71045 X-RAY EXAM CHEST 1 VIEW: CPT

## 2019-11-10 PROCEDURE — 6370000000 HC RX 637 (ALT 250 FOR IP): Performed by: STUDENT IN AN ORGANIZED HEALTH CARE EDUCATION/TRAINING PROGRAM

## 2019-11-10 PROCEDURE — 2000000000 HC ICU R&B

## 2019-11-10 PROCEDURE — 2580000003 HC RX 258: Performed by: STUDENT IN AN ORGANIZED HEALTH CARE EDUCATION/TRAINING PROGRAM

## 2019-11-10 PROCEDURE — 36415 COLL VENOUS BLD VENIPUNCTURE: CPT

## 2019-11-10 PROCEDURE — 84100 ASSAY OF PHOSPHORUS: CPT

## 2019-11-10 PROCEDURE — 94770 HC ETCO2 MONITOR DAILY: CPT

## 2019-11-10 PROCEDURE — 94003 VENT MGMT INPAT SUBQ DAY: CPT

## 2019-11-10 PROCEDURE — 99291 CRITICAL CARE FIRST HOUR: CPT | Performed by: INTERNAL MEDICINE

## 2019-11-10 PROCEDURE — 6360000002 HC RX W HCPCS: Performed by: STUDENT IN AN ORGANIZED HEALTH CARE EDUCATION/TRAINING PROGRAM

## 2019-11-10 PROCEDURE — 2500000003 HC RX 250 WO HCPCS: Performed by: STUDENT IN AN ORGANIZED HEALTH CARE EDUCATION/TRAINING PROGRAM

## 2019-11-10 PROCEDURE — 85025 COMPLETE CBC W/AUTO DIFF WBC: CPT

## 2019-11-10 PROCEDURE — 83874 ASSAY OF MYOGLOBIN: CPT

## 2019-11-10 PROCEDURE — 94640 AIRWAY INHALATION TREATMENT: CPT

## 2019-11-10 PROCEDURE — 36600 WITHDRAWAL OF ARTERIAL BLOOD: CPT

## 2019-11-10 PROCEDURE — 2700000000 HC OXYGEN THERAPY PER DAY

## 2019-11-10 PROCEDURE — 94761 N-INVAS EAR/PLS OXIMETRY MLT: CPT

## 2019-11-10 PROCEDURE — 82803 BLOOD GASES ANY COMBINATION: CPT

## 2019-11-10 RX ORDER — FENTANYL CITRATE 50 UG/ML
50 INJECTION, SOLUTION INTRAMUSCULAR; INTRAVENOUS
Status: DISCONTINUED | OUTPATIENT
Start: 2019-11-10 | End: 2019-11-11 | Stop reason: HOSPADM

## 2019-11-10 RX ORDER — PROPOFOL 10 MG/ML
10 INJECTION, EMULSION INTRAVENOUS
Status: DISCONTINUED | OUTPATIENT
Start: 2019-11-10 | End: 2019-11-11 | Stop reason: HOSPADM

## 2019-11-10 RX ORDER — SODIUM CHLORIDE 9 MG/ML
INJECTION, SOLUTION INTRAVENOUS CONTINUOUS
Status: DISCONTINUED | OUTPATIENT
Start: 2019-11-10 | End: 2019-11-11 | Stop reason: HOSPADM

## 2019-11-10 RX ADMIN — SODIUM PHOSPHATE, MONOBASIC, MONOHYDRATE 20 MMOL: 276; 142 INJECTION, SOLUTION INTRAVENOUS at 06:53

## 2019-11-10 RX ADMIN — FAMOTIDINE 20 MG: 10 INJECTION, SOLUTION INTRAVENOUS at 08:29

## 2019-11-10 RX ADMIN — PROPOFOL 15 MCG/KG/MIN: 10 INJECTION, EMULSION INTRAVENOUS at 05:35

## 2019-11-10 RX ADMIN — METHYLPREDNISOLONE SODIUM SUCCINATE 40 MG: 40 INJECTION, POWDER, FOR SOLUTION INTRAMUSCULAR; INTRAVENOUS at 20:08

## 2019-11-10 RX ADMIN — Medication 10 ML: at 08:29

## 2019-11-10 RX ADMIN — FAMOTIDINE 20 MG: 10 INJECTION, SOLUTION INTRAVENOUS at 20:08

## 2019-11-10 RX ADMIN — AZITHROMYCIN MONOHYDRATE 500 MG: 500 INJECTION, POWDER, LYOPHILIZED, FOR SOLUTION INTRAVENOUS at 14:54

## 2019-11-10 RX ADMIN — IPRATROPIUM BROMIDE 6 PUFF: 17 AEROSOL, METERED RESPIRATORY (INHALATION) at 12:32

## 2019-11-10 RX ADMIN — METHYLPREDNISOLONE SODIUM SUCCINATE 40 MG: 40 INJECTION, POWDER, FOR SOLUTION INTRAMUSCULAR; INTRAVENOUS at 08:29

## 2019-11-10 RX ADMIN — METHYLPREDNISOLONE SODIUM SUCCINATE 40 MG: 40 INJECTION, POWDER, FOR SOLUTION INTRAMUSCULAR; INTRAVENOUS at 04:53

## 2019-11-10 RX ADMIN — DOCUSATE SODIUM 100 MG: 50 LIQUID ORAL at 08:29

## 2019-11-10 RX ADMIN — FENTANYL CITRATE 50 MCG: 50 INJECTION, SOLUTION INTRAMUSCULAR; INTRAVENOUS at 20:28

## 2019-11-10 RX ADMIN — CEFTRIAXONE SODIUM 1 G: 1 INJECTION, POWDER, FOR SOLUTION INTRAMUSCULAR; INTRAVENOUS at 14:23

## 2019-11-10 RX ADMIN — Medication 10 ML: at 20:08

## 2019-11-10 RX ADMIN — IPRATROPIUM BROMIDE 6 PUFF: 17 AEROSOL, METERED RESPIRATORY (INHALATION) at 19:37

## 2019-11-10 RX ADMIN — FENTANYL CITRATE 50 MCG: 50 INJECTION, SOLUTION INTRAMUSCULAR; INTRAVENOUS at 23:30

## 2019-11-10 RX ADMIN — FENTANYL CITRATE 50 MCG: 50 INJECTION, SOLUTION INTRAMUSCULAR; INTRAVENOUS at 17:06

## 2019-11-10 RX ADMIN — ALBUTEROL SULFATE 6 PUFF: 90 AEROSOL, METERED RESPIRATORY (INHALATION) at 23:18

## 2019-11-10 RX ADMIN — SODIUM CHLORIDE: 9 INJECTION, SOLUTION INTRAVENOUS at 14:55

## 2019-11-10 RX ADMIN — METHYLPREDNISOLONE SODIUM SUCCINATE 40 MG: 40 INJECTION, POWDER, FOR SOLUTION INTRAMUSCULAR; INTRAVENOUS at 16:20

## 2019-11-10 RX ADMIN — ALBUTEROL SULFATE 6 PUFF: 90 AEROSOL, METERED RESPIRATORY (INHALATION) at 03:37

## 2019-11-10 RX ADMIN — ENOXAPARIN SODIUM 40 MG: 40 INJECTION SUBCUTANEOUS at 08:29

## 2019-11-10 RX ADMIN — IPRATROPIUM BROMIDE 6 PUFF: 17 AEROSOL, METERED RESPIRATORY (INHALATION) at 16:02

## 2019-11-10 RX ADMIN — ALBUTEROL SULFATE 6 PUFF: 90 AEROSOL, METERED RESPIRATORY (INHALATION) at 16:03

## 2019-11-10 RX ADMIN — ALBUTEROL SULFATE 6 PUFF: 90 AEROSOL, METERED RESPIRATORY (INHALATION) at 12:32

## 2019-11-10 RX ADMIN — Medication 75 MCG/HR: at 05:35

## 2019-11-10 RX ADMIN — BISACODYL 10 MG: 10 SUPPOSITORY RECTAL at 10:22

## 2019-11-10 RX ADMIN — INSULIN LISPRO 1 UNITS: 100 INJECTION, SOLUTION INTRAVENOUS; SUBCUTANEOUS at 00:43

## 2019-11-10 RX ADMIN — ONDANSETRON 4 MG: 2 INJECTION INTRAMUSCULAR; INTRAVENOUS at 14:25

## 2019-11-10 RX ADMIN — ALBUTEROL SULFATE 6 PUFF: 90 AEROSOL, METERED RESPIRATORY (INHALATION) at 07:43

## 2019-11-10 RX ADMIN — FENTANYL CITRATE 50 MCG: 50 INJECTION, SOLUTION INTRAMUSCULAR; INTRAVENOUS at 14:40

## 2019-11-10 RX ADMIN — PROPOFOL 15 MCG/KG/MIN: 10 INJECTION, EMULSION INTRAVENOUS at 14:54

## 2019-11-10 RX ADMIN — IPRATROPIUM BROMIDE 6 PUFF: 17 AEROSOL, METERED RESPIRATORY (INHALATION) at 07:42

## 2019-11-10 RX ADMIN — INSULIN LISPRO 1 UNITS: 100 INJECTION, SOLUTION INTRAVENOUS; SUBCUTANEOUS at 05:24

## 2019-11-10 RX ADMIN — ALBUTEROL SULFATE 6 PUFF: 90 AEROSOL, METERED RESPIRATORY (INHALATION) at 19:38

## 2019-11-10 ASSESSMENT — PULMONARY FUNCTION TESTS
PIF_VALUE: 36
PIF_VALUE: 35
PIF_VALUE: 36
PIF_VALUE: 35
PIF_VALUE: 36
PIF_VALUE: 35
PIF_VALUE: 5
PIF_VALUE: 35
PIF_VALUE: 36
PIF_VALUE: 37
PIF_VALUE: 38
PIF_VALUE: 36
PIF_VALUE: 35
PIF_VALUE: 35

## 2019-11-11 ENCOUNTER — APPOINTMENT (OUTPATIENT)
Dept: GENERAL RADIOLOGY | Age: 29
DRG: 130 | End: 2019-11-11
Payer: MEDICARE

## 2019-11-11 VITALS
TEMPERATURE: 98.2 F | HEIGHT: 64 IN | OXYGEN SATURATION: 96 % | SYSTOLIC BLOOD PRESSURE: 125 MMHG | WEIGHT: 102.29 LBS | DIASTOLIC BLOOD PRESSURE: 79 MMHG | HEART RATE: 77 BPM | RESPIRATION RATE: 21 BRPM | BODY MASS INDEX: 17.46 KG/M2

## 2019-11-11 LAB
ABSOLUTE EOS #: 0 K/UL (ref 0–0.4)
ABSOLUTE IMMATURE GRANULOCYTE: 0.51 K/UL (ref 0–0.3)
ABSOLUTE LYMPH #: 2.57 K/UL (ref 1–4.8)
ABSOLUTE MONO #: 1.2 K/UL (ref 0.1–0.8)
ANION GAP SERPL CALCULATED.3IONS-SCNC: 8 MMOL/L (ref 9–17)
BASOPHILS # BLD: 0 % (ref 0–2)
BASOPHILS ABSOLUTE: 0 K/UL (ref 0–0.2)
BUN BLDV-MCNC: 22 MG/DL (ref 6–20)
BUN/CREAT BLD: ABNORMAL (ref 9–20)
CALCIUM SERPL-MCNC: 8.6 MG/DL (ref 8.6–10.4)
CHLORIDE BLD-SCNC: 109 MMOL/L (ref 98–107)
CO2: 26 MMOL/L (ref 20–31)
CREAT SERPL-MCNC: 0.46 MG/DL (ref 0.5–0.9)
DIFFERENTIAL TYPE: ABNORMAL
EOSINOPHILS RELATIVE PERCENT: 0 % (ref 1–4)
GFR AFRICAN AMERICAN: >60 ML/MIN
GFR NON-AFRICAN AMERICAN: >60 ML/MIN
GFR SERPL CREATININE-BSD FRML MDRD: ABNORMAL ML/MIN/{1.73_M2}
GFR SERPL CREATININE-BSD FRML MDRD: ABNORMAL ML/MIN/{1.73_M2}
GLUCOSE BLD-MCNC: 121 MG/DL (ref 65–105)
GLUCOSE BLD-MCNC: 123 MG/DL (ref 65–105)
GLUCOSE BLD-MCNC: 135 MG/DL (ref 70–99)
GLUCOSE BLD-MCNC: 139 MG/DL (ref 65–105)
HCT VFR BLD CALC: 32.4 % (ref 36.3–47.1)
HEMOGLOBIN: 10.5 G/DL (ref 11.9–15.1)
IMMATURE GRANULOCYTES: 3 %
LYMPHOCYTES # BLD: 15 % (ref 24–44)
MCH RBC QN AUTO: 28.8 PG (ref 25.2–33.5)
MCHC RBC AUTO-ENTMCNC: 32.4 G/DL (ref 28.4–34.8)
MCV RBC AUTO: 88.8 FL (ref 82.6–102.9)
MONOCYTES # BLD: 7 % (ref 1–7)
MORPHOLOGY: NORMAL
NRBC AUTOMATED: 0 PER 100 WBC
PDW BLD-RTO: 12.8 % (ref 11.8–14.4)
PHOSPHORUS: 3 MG/DL (ref 2.6–4.5)
PLATELET # BLD: 216 K/UL (ref 138–453)
PLATELET ESTIMATE: ABNORMAL
PMV BLD AUTO: 10.6 FL (ref 8.1–13.5)
POTASSIUM SERPL-SCNC: 3.8 MMOL/L (ref 3.7–5.3)
RBC # BLD: 3.65 M/UL (ref 3.95–5.11)
RBC # BLD: ABNORMAL 10*6/UL
SEG NEUTROPHILS: 75 % (ref 36–66)
SEGMENTED NEUTROPHILS ABSOLUTE COUNT: 12.82 K/UL (ref 1.8–7.7)
SODIUM BLD-SCNC: 143 MMOL/L (ref 135–144)
WBC # BLD: 17.1 K/UL (ref 3.5–11.3)
WBC # BLD: ABNORMAL 10*3/UL

## 2019-11-11 PROCEDURE — 6360000002 HC RX W HCPCS: Performed by: STUDENT IN AN ORGANIZED HEALTH CARE EDUCATION/TRAINING PROGRAM

## 2019-11-11 PROCEDURE — 94640 AIRWAY INHALATION TREATMENT: CPT

## 2019-11-11 PROCEDURE — 94003 VENT MGMT INPAT SUBQ DAY: CPT

## 2019-11-11 PROCEDURE — 36415 COLL VENOUS BLD VENIPUNCTURE: CPT

## 2019-11-11 PROCEDURE — 84100 ASSAY OF PHOSPHORUS: CPT

## 2019-11-11 PROCEDURE — 2580000003 HC RX 258: Performed by: STUDENT IN AN ORGANIZED HEALTH CARE EDUCATION/TRAINING PROGRAM

## 2019-11-11 PROCEDURE — 6370000000 HC RX 637 (ALT 250 FOR IP): Performed by: STUDENT IN AN ORGANIZED HEALTH CARE EDUCATION/TRAINING PROGRAM

## 2019-11-11 PROCEDURE — 94770 HC ETCO2 MONITOR DAILY: CPT

## 2019-11-11 PROCEDURE — 99291 CRITICAL CARE FIRST HOUR: CPT | Performed by: INTERNAL MEDICINE

## 2019-11-11 PROCEDURE — 2700000000 HC OXYGEN THERAPY PER DAY

## 2019-11-11 PROCEDURE — 82947 ASSAY GLUCOSE BLOOD QUANT: CPT

## 2019-11-11 PROCEDURE — 80048 BASIC METABOLIC PNL TOTAL CA: CPT

## 2019-11-11 PROCEDURE — 2500000003 HC RX 250 WO HCPCS: Performed by: STUDENT IN AN ORGANIZED HEALTH CARE EDUCATION/TRAINING PROGRAM

## 2019-11-11 PROCEDURE — 85025 COMPLETE CBC W/AUTO DIFF WBC: CPT

## 2019-11-11 PROCEDURE — S9441 ASTHMA EDUCATION: HCPCS

## 2019-11-11 PROCEDURE — 94664 DEMO&/EVAL PT USE INHALER: CPT

## 2019-11-11 PROCEDURE — 94761 N-INVAS EAR/PLS OXIMETRY MLT: CPT

## 2019-11-11 PROCEDURE — 71045 X-RAY EXAM CHEST 1 VIEW: CPT

## 2019-11-11 RX ORDER — PREDNISONE 10 MG/1
30 TABLET ORAL DAILY
Qty: 9 TABLET | Refills: 0 | Status: SHIPPED | OUTPATIENT
Start: 2019-11-17 | End: 2019-11-20

## 2019-11-11 RX ORDER — ALBUTEROL SULFATE 90 UG/1
AEROSOL, METERED RESPIRATORY (INHALATION)
Qty: 18 G | Refills: 0 | Status: SHIPPED | OUTPATIENT
Start: 2019-11-11 | End: 2020-02-06 | Stop reason: SDUPTHER

## 2019-11-11 RX ORDER — PREDNISONE 10 MG/1
20 TABLET ORAL DAILY
Qty: 6 TABLET | Refills: 0 | Status: SHIPPED | OUTPATIENT
Start: 2019-11-20 | End: 2019-11-23

## 2019-11-11 RX ORDER — PREDNISONE 10 MG/1
40 TABLET ORAL DAILY
Qty: 12 TABLET | Refills: 0 | Status: SHIPPED | OUTPATIENT
Start: 2019-11-14 | End: 2019-11-17

## 2019-11-11 RX ORDER — AMOXICILLIN AND CLAVULANATE POTASSIUM 875; 125 MG/1; MG/1
1 TABLET, FILM COATED ORAL 2 TIMES DAILY
Qty: 4 TABLET | Refills: 0 | Status: SHIPPED | OUTPATIENT
Start: 2019-11-11 | End: 2019-11-13

## 2019-11-11 RX ORDER — PREDNISONE 10 MG/1
40 TABLET ORAL 2 TIMES DAILY
Qty: 24 TABLET | Refills: 0 | Status: SHIPPED | OUTPATIENT
Start: 2019-11-11 | End: 2019-11-14

## 2019-11-11 RX ORDER — PREDNISONE 10 MG/1
10 TABLET ORAL DAILY
Qty: 5 TABLET | Refills: 0 | Status: SHIPPED | OUTPATIENT
Start: 2019-11-23 | End: 2019-11-28

## 2019-11-11 RX ADMIN — ALBUTEROL SULFATE 6 PUFF: 90 AEROSOL, METERED RESPIRATORY (INHALATION) at 08:36

## 2019-11-11 RX ADMIN — ALBUTEROL SULFATE 6 PUFF: 90 AEROSOL, METERED RESPIRATORY (INHALATION) at 03:36

## 2019-11-11 RX ADMIN — ENOXAPARIN SODIUM 40 MG: 40 INJECTION SUBCUTANEOUS at 08:18

## 2019-11-11 RX ADMIN — SODIUM CHLORIDE: 9 INJECTION, SOLUTION INTRAVENOUS at 00:00

## 2019-11-11 RX ADMIN — Medication 10 ML: at 08:15

## 2019-11-11 RX ADMIN — METHYLPREDNISOLONE SODIUM SUCCINATE 40 MG: 40 INJECTION, POWDER, FOR SOLUTION INTRAMUSCULAR; INTRAVENOUS at 08:14

## 2019-11-11 RX ADMIN — PROPOFOL 20 MCG/KG/MIN: 10 INJECTION, EMULSION INTRAVENOUS at 00:00

## 2019-11-11 RX ADMIN — FENTANYL CITRATE 50 MCG: 50 INJECTION, SOLUTION INTRAMUSCULAR; INTRAVENOUS at 06:28

## 2019-11-11 RX ADMIN — FAMOTIDINE 20 MG: 10 INJECTION, SOLUTION INTRAVENOUS at 08:11

## 2019-11-11 RX ADMIN — IPRATROPIUM BROMIDE 6 PUFF: 17 AEROSOL, METERED RESPIRATORY (INHALATION) at 08:36

## 2019-11-11 RX ADMIN — FENTANYL CITRATE 50 MCG: 50 INJECTION, SOLUTION INTRAMUSCULAR; INTRAVENOUS at 04:00

## 2019-11-11 RX ADMIN — FENTANYL CITRATE 50 MCG: 50 INJECTION, SOLUTION INTRAMUSCULAR; INTRAVENOUS at 08:46

## 2019-11-11 RX ADMIN — METHYLPREDNISOLONE SODIUM SUCCINATE 40 MG: 40 INJECTION, POWDER, FOR SOLUTION INTRAMUSCULAR; INTRAVENOUS at 04:01

## 2019-11-11 RX ADMIN — DOCUSATE SODIUM 100 MG: 50 LIQUID ORAL at 08:15

## 2019-11-11 ASSESSMENT — PULMONARY FUNCTION TESTS
PIF_VALUE: 35
PIF_VALUE: 36
PIF_VALUE: 6
PIF_VALUE: 36
PIF_VALUE: 35
PIF_VALUE: 5
PIF_VALUE: 8
PIF_VALUE: 35
PIF_VALUE: 36
PIF_VALUE: 35
PIF_VALUE: 35

## 2019-11-11 ASSESSMENT — PAIN SCALES - GENERAL: PAINLEVEL_OUTOF10: 9

## 2019-11-11 ASSESSMENT — PAIN DESCRIPTION - DESCRIPTORS: DESCRIPTORS: SORE

## 2019-11-11 ASSESSMENT — PAIN DESCRIPTION - LOCATION: LOCATION: THROAT

## 2019-11-12 ENCOUNTER — TELEPHONE (OUTPATIENT)
Dept: INTERNAL MEDICINE | Age: 29
End: 2019-11-12

## 2019-11-14 LAB
EKG ATRIAL RATE: 90 BPM
EKG Q-T INTERVAL: 478 MS
EKG QRS DURATION: 72 MS
EKG QTC CALCULATION (BAZETT): 485 MS
EKG R AXIS: 82 DEGREES
EKG T AXIS: 80 DEGREES
EKG VENTRICULAR RATE: 62 BPM

## 2020-02-06 ENCOUNTER — TELEPHONE (OUTPATIENT)
Dept: INTERNAL MEDICINE | Age: 30
End: 2020-02-06

## 2020-02-06 ENCOUNTER — OFFICE VISIT (OUTPATIENT)
Dept: INTERNAL MEDICINE | Age: 30
End: 2020-02-06
Payer: MEDICARE

## 2020-02-06 VITALS
WEIGHT: 99.2 LBS | SYSTOLIC BLOOD PRESSURE: 107 MMHG | DIASTOLIC BLOOD PRESSURE: 64 MMHG | BODY MASS INDEX: 16.94 KG/M2 | HEIGHT: 64 IN | HEART RATE: 86 BPM

## 2020-02-06 PROCEDURE — 4004F PT TOBACCO SCREEN RCVD TLK: CPT | Performed by: INTERNAL MEDICINE

## 2020-02-06 PROCEDURE — 99211 OFF/OP EST MAY X REQ PHY/QHP: CPT | Performed by: INTERNAL MEDICINE

## 2020-02-06 PROCEDURE — G8427 DOCREV CUR MEDS BY ELIG CLIN: HCPCS | Performed by: INTERNAL MEDICINE

## 2020-02-06 PROCEDURE — G8484 FLU IMMUNIZE NO ADMIN: HCPCS | Performed by: INTERNAL MEDICINE

## 2020-02-06 PROCEDURE — G8419 CALC BMI OUT NRM PARAM NOF/U: HCPCS | Performed by: INTERNAL MEDICINE

## 2020-02-06 PROCEDURE — 99215 OFFICE O/P EST HI 40 MIN: CPT | Performed by: INTERNAL MEDICINE

## 2020-02-06 RX ORDER — ALBUTEROL SULFATE 90 UG/1
AEROSOL, METERED RESPIRATORY (INHALATION)
Qty: 18 G | Refills: 0 | Status: SHIPPED | OUTPATIENT
Start: 2020-02-06 | End: 2020-05-19 | Stop reason: SDUPTHER

## 2020-02-06 NOTE — PROGRESS NOTES
[facility]      Date of Patient's Visit: 2/6/2020    Progress note    Patient Care Team:  Viridiana Muller MD as PCP - General (Internal Medicine)  Viridiana Muller MD as PCP - [facility]  Carrie Hill DO as Resident (Obstetrics & Gynecology)  Angelica Stevenson MD as Obstetrician (Perinatology)  Xiomara Salazar DPM as Physician (Podiatry)  Xiomara Salazar DPM as Physician (Podiatry)      [address] [place] [address]  Chief Complaint   Patient presents with    Medication Refill       Ladonna Romo is a 34 y.o. 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 Uncomplicated pregnancies. Did experiencing this progressive weight loss for about 1 year. Since summer last year she had lost about 30 pounds. At that time she reports she was in a high intensity job required long hours, she was constantly missing her meals. She has quit her job 3 months ago. She takes about 2000 pasha a day. And has been drinking boost high-protein 3 cans a day. Denies any episodes of low-grade fever, night sweats, chills, lymphadenopathy, hypertension, tachycardia, abnormal pigmentation in the axilla or low blood sugars. She stopped taking her birth control last month and reports that her periods are normal.   She has positive family history of Crohn's disease in mother. Mother is also very skinny and has a low BMI. Depression screening is negative    The last 1 month her body weight has been stable at 99 pounds. She admits to enjoy smoking a lot . .1 pack/day  He occasionally smokes marijuana along with drinking beers. She does have history of asthma with a recent hospital admission for status asthmaticus requiring intubation and ventilator support in November 2019.    She reports that she left AMA however also admits that she was cognitively impaired at that time to member that incidence or support that decision. ROS  All other review of systems negative, except for those noted. Review of Systems    Past Medical History:   Diagnosis Date    Asthma     H/O chlamydia infection     10/2014 - treated, 4/2015 LARISSA neg    Marijuana smoker 3/28/2017    Postpartum depression     no meds       Past Surgical History:   Procedure Laterality Date    OSTEOTOMY Bilateral 12/10/2015    1) Decompression metatarsal osteotomy right 2nd. 2) Decompression metatarsal osteotomy left 2nd. Family History   Problem Relation Age of Onset    Other Mother         Chrons    Hypertension Father     No Known Problems Sister         3 sisters with asthma    Other Brother         2 brothers in good health    No Known Problems Maternal Grandmother     No Known Problems Maternal Grandfather     No Known Problems Paternal Grandmother     No Known Problems Paternal Grandfather        Social History     Socioeconomic History    Marital status: Single     Spouse name: None    Number of children: None    Years of education: None    Highest education level: None   Occupational History    None   Social Needs    Financial resource strain: None    Food insecurity:     Worry: None     Inability: None    Transportation needs:     Medical: None     Non-medical: None   Tobacco Use    Smoking status: Current Every Day Smoker     Years: 13.00     Types: Cigarettes    Smokeless tobacco: Never Used    Tobacco comment: \"5-8 cigs per day. \"  8/24/2017   Substance and Sexual Activity    Alcohol use: Yes     Comment: social.   Pt has some alcohol in prior to knowledge of preg.      Drug use: No     Frequency: 2.0 times per week     Types: Marijuana     Comment: last used week of march 13    Sexual activity: Yes     Partners: Male   Lifestyle    Physical activity:     Days per week: None     Minutes per session: None    Stress: None   Relationships    Social connections:     Talks on phone: None     Gets together: None All patient questions answered. Pt voiced understanding.      3. Patient given educational materials - see patient instructions      Ganesh Lopez MD  Attending Physician, [address] [place] [address], Internal Medicine Residency Program  [facility]) Physician - [facility]  2/6/2020, 12:23 PM

## 2020-02-06 NOTE — PATIENT INSTRUCTIONS
Return To Clinic 4/30/2020 for 3 month follow up. After Visit Summary given and reviewed. The medication list included in this document is our record of what you are currently taking, including any changes that were made at today's visit. If you find any differences when compared to your medications at home, or have any questions that were not answered at your visit, please contact the office. It is very important for your care that you keep your appointment. If for some reason you are unable to keep your appointment, it is equally important that you call our office at [phone] to cancel your appointment and reschedule. Failure to do so may result in your termination from our practice. LABORATORY INSTRUCTIONS    Your doctor has ordered blood or urine testing. You can get this testing done at the Lab located on the first floor of the [facility], or at any other [facility]. Please stop at Main Registration, before going to the lab, as you must be registered first.     Please get this lab done before your next visit. You may eat or drink before this test.      Referral for Pulmonology sent to [facility]. Specialty office will contact patient for appointment. A copy of referral with contact information and address given to patient.  Patient should contact specialist office if no contact has been made to patient within 1 week.     LENIN Roberts

## 2020-02-06 NOTE — PROGRESS NOTES
Visit Information    Have you changed or started any medications since your last visit including any over-the-counter medicines, vitamins, or herbal medicines? yes - multi   Have you stopped taking any of your medications? Is so, why? -  no  Are you having any side effects from any of your medications? - no    Have you seen any other physician or provider since your last visit?  no   Have you had any other diagnostic tests since your last visit? yes -    Have you been seen in the emergency room and/or had an admission in a hospital since we last saw you?  yes - Hospitalized in Nov   Have you had your routine dental cleaning in the past 6 months?  no     Do you have an active MyChart account? If no, what is the barrier?   No:     Patient Care Team:  Maciel Garcia MD as PCP - General (Internal Medicine)  Maciel Garcia MD as PCP - [facility] Provider  Roya Velasquez DO as Resident (Obstetrics & Gynecology)  Dejuan Wheeler MD as Obstetrician (Perinatology)  Chadd Funez DPM as Physician (Podiatry)  Chadd Funez DPM as Physician (Podiatry)    Medical History Review  Past Medical, Family, and Social History reviewed and does contribute to the patient presenting condition    Health Maintenance   Topic Date Due    Pneumococcal 0-64 years Vaccine (1 of 1 - PPSV23) 09/19/1996    Hepatitis A vaccine (2 of 2 - 2-dose series) 02/06/2009    Flu vaccine (1) 09/01/2019    Varicella vaccine (1 of 2 - 2-dose childhood series) 06/06/2020 (Originally 9/19/1991)    Cervical cancer screen  05/08/2021    DTaP/Tdap/Td vaccine (7 - Td) 07/26/2028    Shingles Vaccine (1 of 2) 09/19/2040    Hepatitis B vaccine  Completed    Hib vaccine  Completed    HIV screen  Completed    Meningococcal (ACWY) vaccine  Aged Out

## 2020-05-15 NOTE — TELEPHONE ENCOUNTER
Refill request for Dulera and Ventolin. If appropriate please send medication(s) to patients pharmacy. Next appt: Patient is currently on wait list for provider.     Last appt: 2/6/2020    Health Maintenance   Topic Date Due    Pneumococcal 0-64 years Vaccine (1 of 1 - PPSV23) 09/19/1996    Hepatitis A vaccine (2 of 2 - 2-dose series) 02/06/2009    Varicella vaccine (1 of 2 - 2-dose childhood series) 06/06/2020 (Originally 9/19/1991)    Flu vaccine (Season Ended) 09/01/2020    Cervical cancer screen  05/08/2021    DTaP/Tdap/Td vaccine (7 - Td) 07/26/2028    Hepatitis B vaccine  Completed    Hib vaccine  Completed    HIV screen  Completed    Meningococcal (ACWY) vaccine  Aged Out       No results found for: LABA1C          ( goal A1C is < 7)   No results found for: LABMICR  LDL Cholesterol (mg/dL)   Date Value   10/15/2014 81       (goal LDL is <100)   AST (U/L)   Date Value   11/08/2019 30     ALT (U/L)   Date Value   11/08/2019 23     BUN (mg/dL)   Date Value   11/11/2019 22 (H)     BP Readings from Last 3 Encounters:   02/06/20 107/64   11/11/19 125/79   10/15/19 105/67          (goal 120/80)          Patient Active Problem List:     Asthma     Tobacco use     Marijuana smoker     H/O Postpartum depression     Vaccine counseling/Gardasil Schedule     Status asthmaticus

## 2020-05-19 RX ORDER — ALBUTEROL SULFATE 90 UG/1
AEROSOL, METERED RESPIRATORY (INHALATION)
Qty: 18 G | Refills: 0 | Status: SHIPPED | OUTPATIENT
Start: 2020-05-19 | End: 2020-07-14 | Stop reason: SDUPTHER

## 2020-05-27 ENCOUNTER — TELEPHONE (OUTPATIENT)
Dept: INTERNAL MEDICINE | Age: 30
End: 2020-05-27

## 2020-05-27 NOTE — LETTER
[address]  [address] [facility] [address]  Phone: [phone]  Fax: [phone]    Russell Barthel, MD        May 27, 2020    [facility]. [address] [place] [address]      Dear Gary Stuart: We are sending this letter because your PCP ordered [facility] for you to have done at your last visit here and they have not yet been completed. If you can please come to our office on the 2nd floor to  your orders to have them compelted. If you do not have a follow-up appointment scheduled you can either contact the office to make an appointment with us or you can make one when you come in to pick-up your orders. If you have any questions or concerns, please don't hesitate to call.     Sincerely,        Russell Barthel, MD

## 2020-06-18 ENCOUNTER — HOSPITAL ENCOUNTER (OUTPATIENT)
Age: 30
Setting detail: SPECIMEN
Discharge: HOME | End: 2020-06-18
Payer: MEDICARE

## 2020-06-18 ENCOUNTER — OFFICE VISIT (OUTPATIENT)
Dept: OBGYN | Age: 30
End: 2020-06-18
Payer: MEDICARE

## 2020-06-18 VITALS
HEIGHT: 64 IN | BODY MASS INDEX: 17.75 KG/M2 | DIASTOLIC BLOOD PRESSURE: 79 MMHG | HEART RATE: 98 BPM | TEMPERATURE: 98.8 F | WEIGHT: 104 LBS | SYSTOLIC BLOOD PRESSURE: 122 MMHG

## 2020-06-18 LAB
CONTROL: PRESENT
HAV IGM SER IA-ACNC: NONREACTIVE
HEPATITIS B CORE IGM ANTIBODY: NONREACTIVE
HEPATITIS B SURFACE ANTIGEN: NONREACTIVE
HEPATITIS C ANTIBODY: NONREACTIVE
HIV AG/AB: NONREACTIVE
PREGNANCY TEST URINE, POC: NEGATIVE
T. PALLIDUM, IGG: NONREACTIVE

## 2020-06-18 PROCEDURE — 99395 PREV VISIT EST AGE 18-39: CPT | Performed by: OBSTETRICS & GYNECOLOGY

## 2020-06-18 PROCEDURE — 81025 URINE PREGNANCY TEST: CPT | Performed by: OBSTETRICS & GYNECOLOGY

## 2020-06-18 RX ORDER — MEDROXYPROGESTERONE ACETATE 150 MG/ML
150 INJECTION, SUSPENSION INTRAMUSCULAR
Qty: 1 ML | Refills: 3 | Status: SHIPPED | OUTPATIENT
Start: 2020-06-18 | End: 2020-10-01 | Stop reason: ALTCHOICE

## 2020-06-18 NOTE — PROGRESS NOTES
History and Physical    Tnelizabeth [place]  2020              34 y.o. Chief Complaint   Patient presents with    Gynecologic Exam       No LMP recorded. Patient has had an injection. Primary Care Physician: Nupur Clinton MD    The patient was seen and examined. She has no chief complaint today and is here for her annual exam.  Her bowels are regular. There are no voiding complaints. She denies any bloating. She denies vaginal discharge and was counseled on STD's and the need for barrier contraception. HPI : Steven Hobson is a 34 y.o. female     Pt here for annual exam.    She would like STD testing - cultures and blood work    Patient also would like to restart her depo provera. She has been on it in the past and was satisfied with it. Last menstrual period started 2020. Last intercourse was 5 days ago (protected). Her UPT was negative today. Pt can have depo injection today.  ________________________________________________________________________  OB History    Para Term  AB Living   2 2 2 0 0 2   SAB TAB Ectopic Molar Multiple Live Births   0 0 0 0 0 2      # Outcome Date GA Lbr Damien/2nd Weight Sex Delivery Anes PTL Lv   2 Term 17 37w0d  5 lb 8.5 oz (2.509 kg) M Vag-Spont   JAMES      Birth Comments: APG 8/9   1 Term 01/09/10 40w0d  6 lb 6 oz (2.892 kg) M Vag-Spont None  JAMES     Past Medical History:   Diagnosis Date    Asthma     H/O chlamydia infection     10/2014 - treated, 2015 LARISSA neg    Marijuana smoker 3/28/2017    Postpartum depression     no meds                                                                   Past Surgical History:   Procedure Laterality Date    OSTEOTOMY Bilateral 12/10/2015    1) Decompression metatarsal osteotomy right 2nd. 2) Decompression metatarsal osteotomy left 2nd.      Family History   Problem Relation Age of Onset    Other Mother         Chrons    Hypertension Father     No Known Problems Sister         3 college but did not finish. MEDICATIONS:  Current Outpatient Medications   Medication Sig Dispense Refill    medroxyPROGESTERone (DEPO-PROVERA) 150 MG/ML injection Inject 1 mL into the muscle every 3 months 1 mL 3    albuterol sulfate HFA (VENTOLIN HFA) 108 (90 Base) MCG/ACT inhaler inhale 2 puffs by mouth every 4 hours if needed 18 g 0    mometasone-formoterol (DULERA) 100-5 MCG/ACT inhaler inhale 2 puffs by mouth every 12 hours 13 g 0    Prenatal Multivit-Min-Fe-FA (PRENATAL VITAMINS) 0.8 MG TABS Take 1 capsule by mouth daily 30 tablet 11    tiotropium (SPIRIVA RESPIMAT) 1.25 MCG/ACT AERS inhaler Inhale 2 puffs into the lungs daily 2 Inhaler 2    medroxyPROGESTERone (DEPO-PROVERA) 150 MG/ML injection Inject 1 mL into the muscle every 3 months for 1 dose (Patient not taking: Reported on 2/6/2020) 1 mL 4    benzoyl peroxide-erythromycin (BENZAMYCIN) 5-3 % gel apply to affected area twice a day (Patient not taking: Reported on 6/18/2020) 23.3 g 5    ammonium lactate (LAC-HYDRIN) 12 % cream Apply topically as needed. (Patient not taking: Reported on 2/6/2020) 1 Bottle 4     No current facility-administered medications for this visit. ALLERGIES:  Allergies as of 06/18/2020    (No Known Allergies)       Symptoms of decreased mood absent  Symptoms of anhedonia absent      Immunization status: stated as current, but no records available. Gynecologic History:  Menarche: 5 yo  Menopause: n/a     No LMP recorded. Patient has had an injection. Sexually Active: Yes - men only, one partner currently    STD History: Yes - chlamydia     Permanent Sterilization: No   Reversible Birth Control: No        Hormone Replacement Exposure: No      Genetic Qualified Family History of Breast, Ovarian , Colon or Uterine Cancer: Yes - paternal aunt had breast cancer     If YES see scanned worksheet.     Preventative Health Testing:    Health Maintenance:  Health Maintenance Due   Topic Date Due    Varicella Site: Right Upper Arm   Position: Sitting   Cuff Size: Medium Adult   Pulse: 98   Temp: 98.8 °F (37.1 °C)   Weight: 104 lb (47.2 kg)   Height: 5' 4\" (1.626 m)         General Appearance: This  is a well Developed, well Nourished, well groomed female. Her BMI was reviewed. Nutritional decision making was discussed. Skin:  There was a Normal Inspection of the skin without rashes or lesions. There were no rashes. (Papular, Maculopapular, Hives, Pustular, Macular)     There were no lesions (Ulcers, Erythema, Abn. Appearing Nevi)            Lymphatic:  No Lymph Nodes were Palpable in the neck , axilla or groin.  0 # Of Lymph Nodes; Location ; Character [Normal]  [Shotty] [Tender] [Enlarged]     Neck and EENT:  The neck was supple. There were no masses   The thyroid was not enlarged and had no masses. Perrla, EOMI B/L, TMI B/L No Abnormalities. Throat inspected-No exudates or Masses, Nares Patent No Masses        Respiratory: The lungs were auscultated and found to be clear. There were no rales, rhonchi or wheezes. There was a good respiratory effort. Cardiovascular: The heart was in a regular rate and rhythm. . No S3 or S4. There was no murmur appreciated. Location, grade, and radiation are not applicable. Extremities: The patients extremities were without calf tenderness, edema, or varicosities. There was full range of motion in all four extremities. Pulses in all four extremities were appreciated and are 2/4. Abdomen: The abdomen was soft and non-tender. There were good bowel sounds in all quadrants and there was no guarding, rebound or rigidity. On evaluation there was no evidence of hepatosplenomegaly and there was no costal vertebral ariana tenderness bilaterally. No hernias were appreciated. Abdominal Scars: none    Psych:   The patient had a normal Orientation to: Time, Place, Person, and Situation  There is no Mood / Affect changes    Breast:  (Chest)  normal appearance, no Uses inhaler and nebulizer prn. States well controlled      Tobacco use 04/30/2015     Priority: Medium     Pt counseled on maternal/fetal risk factors. Pt verbalizes understanding   Pt desires to quit  6/8/17- Patient continues to smoke. 8 daily      Status asthmaticus 11/04/2019    Vaccine counseling/Gardasil Schedule 07/22/2019 7/22/2019 initial injection  9/22/2019 2nd injection  1/22/2020 3rd injection      Marijuana smoker 03/28/2017     Pt counseled not recommended in pregnancy. Maternal/Fetal risks reviewed. Pt verbalizes understanding. 6/8/17- Patient states she's smoking occasionally.  H/O Postpartum depression 03/28/2017     History of PP depression with previous preg, no meds            Hereditary Breast, Ovarian, Colon and Uterine Cancer screening Done. Tobacco & Secondary smoke risks reviewed; instructed on cessation and avoidance      Counseling Completed:  Preventative Health Recommendations and Follow up. The patient was informed of the recommended preventative health recommendations. 1. Annuals every year; Cytology collections per prevailing guidelines. 2. Mammograms begin every year at 35 yo if no abnormalities are found and no family history. 3. Bone density studies every 2-3 years. Begin at 71 yo. If no fracture history or osteoporosis family history. (significant). 4. Colonoscopy begin at 38 yo. Repeat every ten years if negative and no family history. 5. Calcium of 8563-3496 mg/day in split dosing  6. Vitamin D 400-800 IU/day  7. All other preventative health recommendations will be managed by the patients Primary care physician. The patient was instructed to stop smoking. Morbidity, mortality, and cessation programs were reviewed. Worsening of long and short term medical health risks were discussed with the addition of tobacco. Secondary smoke risks were reviewed with respect to children and newborns.   Increases in Sudden Infant Death Syndrome, asthma, respiratory problems, and cancers were reviewed. PLAN:  Vaginal cultures obtained - will treat if appropriate  Next pap due 5/2021  gardasil - pt received one dose of the quadrivalent in the past - pt refused the vaccine today, but states she will consider it  Depo provera can be given today  Gave lab slips for HIV/hepatitis/syphilis  Return in about 1 year (around 6/18/2021) for Annual Exam.  Repeat Annual every 1 year  Cervical Cytology Evaluation begins at 24years old. If Negative Cytology, Follow-up screening per current guidelines. Mammograms every 1 year. If 35 yo and last mammogram was negative. Calcium and Vitamin D dosing reviewed. Colonoscopy screening reviewed as well as onset for bone density testing. Birth control and barrier recommendations discussed. STD counseling and prevention reviewed. Gardisil counseling completed for all patients 10-35 yo. Routine health maintenance per patients PCP.     King Taya OLIVEIRA

## 2020-06-19 LAB
DIRECT EXAM: ABNORMAL
Lab: ABNORMAL
SPECIMEN DESCRIPTION: ABNORMAL

## 2020-06-19 RX ORDER — METRONIDAZOLE 500 MG/1
500 TABLET ORAL 2 TIMES DAILY
Qty: 14 TABLET | Refills: 0 | Status: SHIPPED | OUTPATIENT
Start: 2020-06-19 | End: 2020-06-26

## 2020-06-20 LAB
C TRACH DNA GENITAL QL NAA+PROBE: NEGATIVE
N. GONORRHOEAE DNA: NEGATIVE
SPECIMEN DESCRIPTION: NORMAL

## 2020-06-25 ENCOUNTER — NURSE ONLY (OUTPATIENT)
Dept: OBGYN | Age: 30
End: 2020-06-25
Payer: MEDICARE

## 2020-06-25 VITALS — WEIGHT: 103.4 LBS | BODY MASS INDEX: 17.75 KG/M2 | TEMPERATURE: 97.5 F

## 2020-06-25 LAB
CONTROL: PRESENT
PREGNANCY TEST URINE, POC: NORMAL

## 2020-06-25 PROCEDURE — 81025 URINE PREGNANCY TEST: CPT | Performed by: OBSTETRICS & GYNECOLOGY

## 2020-06-25 PROCEDURE — 96372 THER/PROPH/DIAG INJ SC/IM: CPT | Performed by: OBSTETRICS & GYNECOLOGY

## 2020-06-25 PROCEDURE — 99211 OFF/OP EST MAY X REQ PHY/QHP: CPT | Performed by: OBSTETRICS & GYNECOLOGY

## 2020-06-25 RX ORDER — MEDROXYPROGESTERONE ACETATE 150 MG/ML
150 INJECTION, SUSPENSION INTRAMUSCULAR ONCE
Status: COMPLETED | OUTPATIENT
Start: 2020-06-25 | End: 2020-06-25

## 2020-06-25 RX ADMIN — MEDROXYPROGESTERONE ACETATE 150 MG: 150 INJECTION, SUSPENSION INTRAMUSCULAR at 13:34

## 2020-07-14 RX ORDER — ALBUTEROL SULFATE 90 UG/1
AEROSOL, METERED RESPIRATORY (INHALATION)
Qty: 18 G | Refills: 0 | Status: SHIPPED | OUTPATIENT
Start: 2020-07-14 | End: 2020-09-29 | Stop reason: SDUPTHER

## 2020-07-14 NOTE — TELEPHONE ENCOUNTER
Pt calling requesting a refill on her Alubterol Inhaler and Dulera Inhaler. Made pt an appt for 7/24/20.     Health Maintenance   Topic Date Due    Varicella vaccine (1 of 2 - 2-dose childhood series) 09/19/1991    Pneumococcal 0-64 years Vaccine (1 of 1 - PPSV23) 09/19/1996    Hepatitis A vaccine (2 of 2 - 2-dose series) 02/06/2009    Flu vaccine (1) 09/01/2020    Cervical cancer screen  05/08/2021    DTaP/Tdap/Td vaccine (7 - Td) 07/26/2028    Hepatitis B vaccine  Completed    Hib vaccine  Completed    HIV screen  Completed    Meningococcal (ACWY) vaccine  Aged Out             (applicable per patient's age: Cancer Screenings, Depression Screening, Fall Risk Screening, Immunizations)    LDL Cholesterol (mg/dL)   Date Value   10/15/2014 81     AST (U/L)   Date Value   11/08/2019 30     ALT (U/L)   Date Value   11/08/2019 23     BUN (mg/dL)   Date Value   11/11/2019 22 (H)      (goal A1C is < 7)   (goal LDL is <100) need 30-50% reduction from baseline     BP Readings from Last 3 Encounters:   06/18/20 122/79   02/06/20 107/64   11/11/19 125/79    (goal /80)      All Future Testing planned in [facility]:  Lab Frequency Next Occurrence   TSH With Reflex Ft4 Once 08/27/2020       Next Visit Date:  Future Appointments   Date Time Provider Jared Mandujano   7/24/2020 11:00 AM Vince Moreno MD [facility] [address]   8/20/2020  2:00 PM Mathew Munson MD Resp [place] [address] [facility]   9/16/2020 11:00 AM SCHEDULE, MHP [facility]            Patient Active Problem List:     Asthma     Tobacco use     Marijuana smoker     H/O Postpartum depression     Vaccine counseling/Gardasil Schedule     Status asthmaticus

## 2020-07-24 ENCOUNTER — TELEPHONE (OUTPATIENT)
Dept: INTERNAL MEDICINE | Age: 30
End: 2020-07-24

## 2020-08-20 ENCOUNTER — OFFICE VISIT (OUTPATIENT)
Dept: PULMONOLOGY | Age: 30
End: 2020-08-20
Payer: MEDICARE

## 2020-08-20 VITALS
OXYGEN SATURATION: 96 % | HEART RATE: 87 BPM | HEIGHT: 64 IN | BODY MASS INDEX: 18.78 KG/M2 | DIASTOLIC BLOOD PRESSURE: 85 MMHG | RESPIRATION RATE: 12 BRPM | SYSTOLIC BLOOD PRESSURE: 123 MMHG | TEMPERATURE: 97.4 F | WEIGHT: 110 LBS

## 2020-08-20 PROBLEM — F33.9 RECURRENT DEPRESSION (HCC): Status: ACTIVE | Noted: 2018-09-08

## 2020-08-20 PROCEDURE — G8427 DOCREV CUR MEDS BY ELIG CLIN: HCPCS | Performed by: INTERNAL MEDICINE

## 2020-08-20 PROCEDURE — G8420 CALC BMI NORM PARAMETERS: HCPCS | Performed by: INTERNAL MEDICINE

## 2020-08-20 PROCEDURE — 4004F PT TOBACCO SCREEN RCVD TLK: CPT | Performed by: INTERNAL MEDICINE

## 2020-08-20 PROCEDURE — 99214 OFFICE O/P EST MOD 30 MIN: CPT | Performed by: INTERNAL MEDICINE

## 2020-08-20 NOTE — PATIENT INSTRUCTIONS
Patient Education        Learning About Asthma  What is asthma? Asthma is a long-term condition that affects your breathing. It causes the airways that lead to the lungs to swell. People with asthma may have asthma attacks. During an asthma attack, the airways tighten and become narrower. This makes it hard to breathe, and you may wheeze or cough. If you have a bad asthma attack, you may need emergency care. Asthma affects people in different ways. Some people only have asthma attacks during allergy season, or when they breathe in cold air, or when they exercise. Others have many bad attacks that send them to the doctor often. What are the symptoms of asthma? When you have asthma, you may:  · Wheeze, making a loud or soft whistling noise when you breathe in and out. · Cough a lot. This is the only symptom for some people. · Feel tightness in your chest.  · Feel short of breath. You may have rapid, shallow breathing or trouble breathing. · Have trouble sleeping because you're coughing or having a hard time breathing. · Get tired quickly during exercise. Symptoms may start soon after you're around things (triggers) that cause your asthma attacks. This is an early phase response. Or they may start several hours after exposure (late phase response). A late phase response can make it harder to figure out what triggers your symptoms. Symptoms can be mild or severe. You may have symptoms daily or just now and then. Or you may have something in between. Some people have symptoms that get worse at night, such as a cough and shortness of breath. How can you prevent asthma attacks? Certain things can make asthma symptoms worse. These are called triggers. When you are around a trigger, an asthma attack is more likely. Common triggers include:  · Cigarette smoke or air pollution. · Things you are allergic to, such as:  ? Pollen, mold, or dust mites. ? Pet hair, skin, or saliva.   · Illnesses, like colds, flu, or pneumonia. · Exercise. · Dry, cold air. Here are some ways to avoid a few common triggers:  · Do not smoke or allow others to smoke around you. If you need help quitting, talk to your doctor about stop-smoking programs and medicines. These can increase your chances of quitting for good. · If there is a lot of pollution, pollen, or dust outside, stay at home and keep your windows closed. Use an air conditioner or air filter in your home. Check your local weather report or newspaper for air quality and pollen reports. · Get the flu vaccine every year. Talk to your doctor about getting a pneumococcal shot. Wash your hands often to prevent infections. · Avoid exercising outdoors in cold weather. If you are outdoors in cold weather, wear a scarf around your face and breathe through your nose. How is asthma treated? There are two parts to treating asthma, which are outlined in your asthma action plan. The goals are to:  · Control asthma over the long term. The asthma action plan tells you which medicine you may need to take every day. This is called a controller medicine. It helps to reduce the swelling of the airways and prevent asthma attacks. · Treat asthma attacks when they occur. The asthma action plan tells you what to do when you have an asthma attack. It helps you identify triggers that can cause your attacks. You use quick-relief medicine during an attack. The asthma plan also helps you track your symptoms and know how well the treatment is working. Follow-up care is a key part of your treatment and safety. Be sure to make and go to all appointments, and call your doctor if you are having problems. It's also a good idea to know your test results and keep a list of the medicines you take. Where can you learn more? Go to https://gordon.BBC Easy. org and sign in to your [employer] account. Enter Q242 in the [facility] box to learn more about \"Learning About Asthma. \"     If you do not have an account, please click on the \"Sign Up Now\" link. Current as of: February 24, 2020               Content Version: 12.5  © 2006-2020 Healthwise, [employer]. Care instructions adapted under license by [facility] [place] ([facility]). If you have questions about a medical condition or this instruction, always ask your healthcare professional. [address] any warranty or liability for your use of this information. Patient Education        Learning About Asthma Triggers  What are asthma triggers? When you have asthma, certain things can make your symptoms worse. These are called triggers. Learn what triggers an asthma attack for you, and avoid the triggers when you can. Common triggers include colds, smoke, air pollution, dust, pollen, pets, stress, and cold air. How do asthma triggers affect you? Triggers can make it harder for your lungs to work as they should. They can lead to sudden breathing problems and other symptoms. When you are around a trigger, an asthma attack is more likely. If your symptoms are severe, you may need emergency treatment or have to go to the hospital for treatment. What can you do to avoid triggers? The first thing is to know your triggers. When you are having symptoms, note the things around you that might be causing them. Then look for patterns that may be triggering your symptoms. Record your triggers on a piece of paper or in an asthma diary. When you have your list of possible triggers, work with your doctor to find ways to avoid them. Avoid colds and flu. Get a pneumococcal vaccine shot. If you have had one before, ask your doctor whether you need a second dose. Get a flu vaccine every year, as soon as it's available. If you must be around people with colds or the flu, wash your hands often. Here are some ways to avoid a few common triggers. · Do not smoke or allow others to smoke around you.  If you need help quitting, talk to your doctor about stop-smoking programs and medicines. These can increase your chances of quitting for good. · If there is a lot of pollution, pollen, or dust outside, stay at home and keep your windows closed. Use an air conditioner or air filter in your home. Check your local weather report or newspaper for air quality and pollen reports. What else should you know? · Take your controller medicine every day, not just when you have symptoms. It helps prevent problems before they occur. · Your doctor may suggest that you check how well your lungs are working by measuring your peak expiratory flow (PEF) throughout the day. Your PEF may drop when you are near things that trigger symptoms. Where can you learn more? Go to https://GadgetATM.Visual Networks. org and sign in to your [employer] account. Enter X993 in the [employer] box to learn more about \"Learning About Asthma Triggers. \"     If you do not have an account, please click on the \"Sign Up Now\" link. Current as of: February 24, 2020               Content Version: 12.5  © 2006-2020 Healthwise, Incorporated. Care instructions adapted under license by [facility] ([facility]). If you have questions about a medical condition or this instruction, always ask your healthcare professional. [place] [address] any warranty or liability for your use of this information.

## 2020-08-21 NOTE — PROGRESS NOTES
Gina Mobley  8/21/2020      Gina Mobley  presented for follow up for asthma   In 11 /2019 pt was admitted to [facility] for status asthmaticus and was intubated . She had to be paralyzed in order to maintain her ventilation and to treat severe bronchospasm . She was extubated and then left AMA same day . She now comes in to for follow up . She has dulera and albuterol but does not use those on regular bases . She only uses prn when she  is wheezing . She has chest tightness every morning   She tt that not with bronchodilator but by changing her breathing pattern. She believes she does not  Need inhalers   She does not know of her triggers   She smokes cigarettes and tried vaping -but vaping she cut down and stopped because it caused her palpitations . She smokes marijuana             Review of Systems -  General ROS: negative for - chills, fatigue, fever or weight loss  ENT ROS: negative for - headaches, oral lesions or sore throat  Cardiovascular ROS: no chest pain , orthopnea or pnd   Gastrointestinal ROS: no abdominal pain, change in bowel habits, or black or bloody stools  Skin - no rash   Neuro - no blurry vision , no loc . No focal weakness   msk - no jt tenderness or swelling    Vascular - no claudication , rest completed and negative   Lymphatic - complete and negative   Hematology - oncology - complete and negative   Allergy immunology - complete and negative    no burning or hematuria       LUNG CANCER SCREENING     1. CRITERIA MET    []     CT ORDERED  []      2. CRITERIA NOT MET   [x]      3. REFUSED                    []        REASON CRITERIA NOT MET     1. SMOKING LESS THAN 30 PY  []      2. AGE LESS THAN 55 or GREATER 77 YEARS  []      3. QUIT SMOKING 15 YEARS OR GREATER   []      4. RECENT CT WITH IN 11 MONTHS    []      5. LIFE EXPECTANCY < 5 YEARS   []      6.  SIGNS  AND SYMPTOMS OF LUNG CANCER   []           Immunization   Immunization History   Administered Date(s) Administered    DT (pediatric) 08/06/1999    DTaP vaccine 02/08/1991, 05/22/1991, 10/08/1991, 05/06/1994    HPV Quadrivalent (Gardasil) 08/06/2008    Hepatitis A Ped/Adol (Havrix, Vaqta) 08/06/2008    Hepatitis B 07/30/2018    Hepatitis B Adult (Engerix-B) 07/30/2018    Hepatitis B Ped/Adol (Engerix-B, Recombivax HB) 09/22/1999, 04/11/2000, 09/19/2000    Hib vaccine 02/08/1991, 05/22/1991, 10/08/1991, 05/06/1994    Influenza Virus Vaccine 01/26/2017    Influenza Whole 01/26/2017    MMR 05/06/1994, 09/22/1999    PPD Test 07/30/2018, 08/17/2018    Polio IPV (IPOL) 08/06/1999    Polio Virus Vaccine 02/08/1991, 05/22/1991, 10/08/1991    Tdap (Boostrix, Adacel) 07/26/2018        Pneumococcal Vaccine     [] Up to date    [x] Indicated  But  [x] Refused  [] Contraindicated       Influenza Vaccine   [] Up to date    [x] Indicated   [] Refused  [] Contraindicated       PAST MEDICAL HISTORY:         Diagnosis Date    Asthma     H/O chlamydia infection     10/2014 - treated, 4/2015 LARISSA neg    Marijuana smoker 3/28/2017    Postpartum depression     no meds       Family History:       Problem Relation Age of Onset    Other Mother         Chrons    Hypertension Father     No Known Problems Sister         3 sisters with asthma    Other Brother         2 brothers in good health    No Known Problems Maternal Grandmother     No Known Problems Maternal Grandfather     No Known Problems Paternal Grandmother     No Known Problems Paternal Grandfather        SURGICAL HISTORY:   Past Surgical History:   Procedure Laterality Date    OSTEOTOMY Bilateral 12/10/2015    1) Decompression metatarsal osteotomy right 2nd. 2) Decompression metatarsal osteotomy left 2nd. Not in a hospital admission. No Known Allergies  Social History     Tobacco Use   Smoking Status Current Every Day Smoker    Years: 13.00    Types: Cigarettes   Smokeless Tobacco Never Used   Tobacco Comment    \"5-8 cigs per day.  \"  8/24/2017     Prior to Admission medications    Medication Sig Start Date End Date Taking? Authorizing Provider   albuterol sulfate HFA (VENTOLIN HFA) 108 (90 Base) MCG/ACT inhaler inhale 2 puffs by mouth every 4 hours if needed 7/14/20  Yes Yuki Barry MD   mometasone-formoterol (DULERA) 100-5 MCG/ACT inhaler inhale 2 puffs by mouth every 12 hours 7/14/20  Yes Yuki Barry MD   medroxyPROGESTERone (DEPO-PROVERA) 150 MG/ML injection Inject 1 mL into the muscle every 3 months 6/18/20  Yes Felicia Steven DO   Prenatal Multivit-Min-Fe-FA (PRENATAL VITAMINS) 0.8 MG TABS Take 1 capsule by mouth daily 2/6/20 8/20/20 Yes Ashley Rose MD         Physical Exam  Head and neck atraumatic, normocephalic    Lymph nodes-no cervical, supraclavicular lymphadenopathy    Neck-no JVP elevation    Lungs -  Thoracic expansion and diaphragmatic excursion diminished. BS diminished and expiratory phase prolonged and insp wheeze . No dullness to percussion or tenderness to palpation. No bronchial breath sounds . CVS- S1, S2 regular. No S3 no S4, no murmurs    Abdomen-nontender, nondistended. Bowel sounds are present. No organomegaly    Lower extremity-no edema    Upper extremity-no edema    Neurological-grossly normal cranial nerves. No overt motor deficit        /85   Pulse 87   Temp 97.4 °F (36.3 °C) (Temporal)   Resp 12   Ht 5' 4\" (1.626 m)   Wt 110 lb (49.9 kg)   SpO2 96% Comment: Room air at rest  Breastfeeding No   BMI 18.88 kg/m²         Results for orders placed or performed in visit on 06/25/20   POCT urine pregnancy   Result Value Ref Range    Preg Test, Ur neg     Control present      PCP: Charla Babcock M.D. Room:  Attending Physician: Charla Babcock M.D. Location:           A comprehensive lung function test is performed in this 59-year-old female  that shows mild reduction in FVC. The FEV1 for this age group is markedly  reduced with serious drop.   The lung volumes indicate air trapping. Diffusion capacity is normal.   Flow-volume loop is compatible and patient  effort was good.     IMPRESSION:  Severe obstructive airway disease without improvement on bronchodilator  therapy, marked air trapping, indicating severe small airway obstructive  disorder. Severe reduction in FEV1 indicating acute bronchospastic  disease. After adequate bronchodilator therapy, a repeat lung function  test will be needed to show improvement.        \"In order to promptly notify physicians concerning their patients, this  document is being released. It is not considered final until the  physician's authentication is found below. \"  Ulysses Speedy, M.D. Millie Heater: 04/29/2015 01:17 P  Trans: 04/30/2015 11:01 A  Assessment   Diagnosis Orders   1. Uncomplicated asthma, unspecified asthma severity, unspecified whether persistent  Full PFT Study With Bronchodilator    Allergen, Region 5 Respiratory Panel       Plan:  1. She had status asthmaticus and was ventilated in 11/2019 but after extubation left AMA . 2. Patient is using her bronchodilators only as needed . She has insp wheeze . Last PFT showed fixed severe obstruction due to asthma . I had long discussion with her and tried multiple times to explain chronic nature of asthma and need to use laba and ics as px twice daily with rescue inhaler . But she is reluctant to use them regularly since she feels that all she needs to do is adjust her breathing pattern . 3. Advised her to stoop smoking cigarettes , marijuana and vaping . 4. Follow after PFT and allergy testing   5. Advise use dulera bid and albuterol prn   6. Will add Singulair and increase dose of dulera if she is agreeable . 7. Difficult situation . 8. Provided educational material         Electronically signed by La Ferrara MD on   8/20/20 at 3:30 PM EDT  Please note that this chart was generated using voice recognition Dragon dictation software.   Although every effort was made to ensure the accuracy of this automated transcription, some errors in transcription may have occurred.

## 2020-09-10 ENCOUNTER — TELEPHONE (OUTPATIENT)
Dept: INTERNAL MEDICINE | Age: 30
End: 2020-09-10

## 2020-09-10 NOTE — LETTER
[address]  [address] [address]  Phone: [phone]  Fax: [phone]    Maribel Sherwood MD        September 10, 2020    [phone] [facility] [place]  [address]      Dear [facility]: We are sending this letter because your PCP ordered [facility] for you to have done at your last visit here and they have not yet been completed. If you can please come to our office on the 2nd floor to  your orders to have them compelted. If you do not have a follow-up appointment scheduled you can either contact the office to make an appointment with us or you can make one when you come in to pick-up your orders. If you have any questions or concerns, please don't hesitate to call.     Sincerely,        Maribel Sherwood MD

## 2020-09-16 ENCOUNTER — TELEPHONE (OUTPATIENT)
Dept: OBGYN | Age: 30
End: 2020-09-16

## 2020-09-16 NOTE — TELEPHONE ENCOUNTER
Patient no showed for 9/16/20 appointment at [address] [address] office. Writer left a voice message for patient to call the office to reschedule appointment.

## 2020-09-25 NOTE — TELEPHONE ENCOUNTER
Request for dulera, albuterol - meds pended. Patient is requesting a few refills on these so she does not have to call in for them. Please filli f appropriate. Patient has VV 10/1.       Next Visit Date:  Future Appointments   Date Time Provider Jared Nazia   10/1/2020 11:10 AM STA PAT RM 4 [facility] PAT [facility]   10/1/2020  1:00 PM Nahed Bhatt MD [facility]   10/5/2020  9:30 AM STV PFT RM 1 [facility]VZ PFT [facility]   10/5/2020 10:45 AM Jaime Melchor MD Resp Spec [address] Maintenance   Topic Date Due    Varicella vaccine (1 of 2 - 2-dose childhood series) 09/19/1991    Pneumococcal 0-64 years Vaccine (1 of 1 - PPSV23) 09/19/1996    Hepatitis A vaccine (2 of 2 - 2-dose series) 02/06/2009    Flu vaccine (1) 09/01/2020    Cervical cancer screen  05/08/2021    DTaP/Tdap/Td vaccine (7 - Td) 07/26/2028    Hepatitis B vaccine  Completed    Hib vaccine  Completed    HIV screen  Completed    Meningococcal (ACWY) vaccine  Aged Out       No results found for: LABA1C          ( goal A1C is < 7)   No results found for: LABMICR  LDL Cholesterol (mg/dL)   Date Value   10/15/2014 81       (goal LDL is <100)   AST (U/L)   Date Value   11/08/2019 30     ALT (U/L)   Date Value   11/08/2019 23     BUN (mg/dL)   Date Value   11/11/2019 22 (H)     BP Readings from Last 3 Encounters:   08/20/20 123/85   06/18/20 122/79   02/06/20 107/64          (goal 120/80)    All Future Testing planned in [facility]  Lab Frequency Next Occurrence   TSH With Reflex Ft4 Once 12/10/2020   Full PFT Study With Bronchodilator Once 08/20/2020   Allergen, Region 5 Respiratory Panel Once 08/20/2020         Patient Active Problem List:     Asthma     Tobacco use     Marijuana smoker     H/O Postpartum depression     Vaccine counseling/Gardasil Schedule     Status asthmaticus     Recurrent depression ([address]

## 2020-09-29 RX ORDER — ALBUTEROL SULFATE 90 UG/1
AEROSOL, METERED RESPIRATORY (INHALATION)
Qty: 18 G | Refills: 0 | Status: SHIPPED | OUTPATIENT
Start: 2020-09-29 | End: 2020-10-01 | Stop reason: SDUPTHER

## 2020-10-01 ENCOUNTER — HOSPITAL ENCOUNTER (OUTPATIENT)
Dept: PREADMISSION TESTING | Age: 30
Setting detail: SPECIMEN
Discharge: HOME | End: 2020-10-05
Payer: MEDICARE

## 2020-10-01 ENCOUNTER — VIRTUAL VISIT (OUTPATIENT)
Dept: INTERNAL MEDICINE | Age: 30
End: 2020-10-01
Payer: MEDICARE

## 2020-10-01 PROCEDURE — 99213 OFFICE O/P EST LOW 20 MIN: CPT | Performed by: INTERNAL MEDICINE

## 2020-10-01 RX ORDER — ALBUTEROL SULFATE 90 UG/1
AEROSOL, METERED RESPIRATORY (INHALATION)
Qty: 18 G | Refills: 11 | Status: SHIPPED | OUTPATIENT
Start: 2020-10-01 | End: 2021-06-03 | Stop reason: SDUPTHER

## 2020-10-01 ASSESSMENT — PATIENT HEALTH QUESTIONNAIRE - PHQ9
2. FEELING DOWN, DEPRESSED OR HOPELESS: 0
SUM OF ALL RESPONSES TO PHQ QUESTIONS 1-9: 0
1. LITTLE INTEREST OR PLEASURE IN DOING THINGS: 0
SUM OF ALL RESPONSES TO PHQ9 QUESTIONS 1 & 2: 0
SUM OF ALL RESPONSES TO PHQ QUESTIONS 1-9: 0

## 2020-10-01 NOTE — PROGRESS NOTES
Jamilah Awad is a 27 y.o. female evaluated via telephone on 10/1/2020. Consent:  She and/or health care decision maker is aware that that she may receive a bill for this telephone service, depending on her insurance coverage, and has provided verbal consent to proceed: Yes      Documentation:  Asthma:  Current treatment includes beta agonist inhalers. Using preventive medication(s) consistently: yes. Residual symptoms: none. Patient denies any other symptoms. Would like to see someone for alternative therapy for back pain  And asking for options to birth control and second opinion. Diagnosis Orders   1. Back pain, unspecified back location, unspecified back pain laterality, unspecified chronicity  External Referral To Chiropractic   2. Moderate persistent asthma without complication  albuterol sulfate HFA (VENTOLIN HFA) 108 (90 Base) MCG/ACT inhaler    mometasone-formoterol (DULERA) 100-5 MCG/ACT inhaler   3. Recurrent depression ([facility] [address]     4. Birth control counseling  External Referral To Ob-Gyn           I affirm this is a Patient Initiated Episode with a Patient who has not had a related appointment within my department in the past 7 days or scheduled within the next 24 hours.     Patient identification was verified at the start of the visit: Yes    Total Time: minutes: 11-20 minutes    Note: not billable if this call serves to triage the patient into an appointment for the relevant concern      Deuce Lainez

## 2020-10-01 NOTE — PATIENT INSTRUCTIONS
Medications e-scribe to pharmacy of pt's choice. Referral for Chiropractic mailed to patient to schedule appointment at a location that she wishes to go to. Referral for OB/GYN mailed to patient, patient to call and schedule appointment to the location that she wishes to go to. Patient to return to clinic 1 year (office will call and schedule an appointment). AVS reviewed and mailed to pt. It is very important for your care that you keep your appointment. If for some reason you are unable to keep your appointment it is equally important that you call our office at [phone] to cancel your appointment and reschedule. Failure to do so may result in your termination from our practice.   MB

## 2020-12-08 ENCOUNTER — HOSPITAL ENCOUNTER (OUTPATIENT)
Age: 30
Setting detail: SPECIMEN
Discharge: HOME | End: 2020-12-08
Payer: MEDICARE

## 2020-12-08 ENCOUNTER — OFFICE VISIT (OUTPATIENT)
Dept: OBGYN | Age: 30
End: 2020-12-08
Payer: MEDICARE

## 2020-12-08 VITALS
SYSTOLIC BLOOD PRESSURE: 124 MMHG | BODY MASS INDEX: 18.47 KG/M2 | HEART RATE: 74 BPM | TEMPERATURE: 96.9 F | DIASTOLIC BLOOD PRESSURE: 85 MMHG | WEIGHT: 107.6 LBS

## 2020-12-08 LAB
CONTROL: NORMAL
PREGNANCY TEST URINE, POC: NORMAL

## 2020-12-08 PROCEDURE — 99213 OFFICE O/P EST LOW 20 MIN: CPT | Performed by: STUDENT IN AN ORGANIZED HEALTH CARE EDUCATION/TRAINING PROGRAM

## 2020-12-08 PROCEDURE — 81025 URINE PREGNANCY TEST: CPT | Performed by: STUDENT IN AN ORGANIZED HEALTH CARE EDUCATION/TRAINING PROGRAM

## 2020-12-08 PROCEDURE — 4004F PT TOBACCO SCREEN RCVD TLK: CPT | Performed by: STUDENT IN AN ORGANIZED HEALTH CARE EDUCATION/TRAINING PROGRAM

## 2020-12-08 PROCEDURE — G8484 FLU IMMUNIZE NO ADMIN: HCPCS | Performed by: STUDENT IN AN ORGANIZED HEALTH CARE EDUCATION/TRAINING PROGRAM

## 2020-12-08 PROCEDURE — G8419 CALC BMI OUT NRM PARAM NOF/U: HCPCS | Performed by: STUDENT IN AN ORGANIZED HEALTH CARE EDUCATION/TRAINING PROGRAM

## 2020-12-08 PROCEDURE — G8427 DOCREV CUR MEDS BY ELIG CLIN: HCPCS | Performed by: STUDENT IN AN ORGANIZED HEALTH CARE EDUCATION/TRAINING PROGRAM

## 2020-12-08 RX ORDER — MEDROXYPROGESTERONE ACETATE 150 MG/ML
150 INJECTION, SUSPENSION INTRAMUSCULAR ONCE
Qty: 1 ML | Refills: 3 | Status: SHIPPED | OUTPATIENT
Start: 2020-12-08 | End: 2022-11-01

## 2020-12-08 RX ORDER — MEDROXYPROGESTERONE ACETATE 150 MG/ML
150 INJECTION, SUSPENSION INTRAMUSCULAR ONCE
Status: COMPLETED | OUTPATIENT
Start: 2020-12-08 | End: 2020-12-08

## 2020-12-08 RX ADMIN — MEDROXYPROGESTERONE ACETATE 150 MG: 150 INJECTION, SUSPENSION INTRAMUSCULAR at 15:58

## 2020-12-08 NOTE — PROGRESS NOTES
SYSTEMS:   Constitutional: negative fever, negative chills, negative weight changes   HEENT: negative visual disturbances, negative headaches   Breast: Negative breast abnormalities, negative breast lumps   Respiratory: negative dyspnea, negative cough   Cardiovascular: negative chest pain,  negative palpitations   Gastrointestinal: negative abdominal pain, negative RUQ pain, negative N/V   Genitourinary: negative dysuria, negative hematuria,  positive vaginal discharge  Dermatological: negative rash, negative pruritis   Hematologic: negative bruising  Immunologic/Lymphatic: negative recent illness, negative recent sick contact  Musculoskeletal: negative back pain, negative myalgias  Neurological:  negative dizziness, negative migraines   Behavior/Psych: negative depression, negative anxiety      Examination chaperoned by Asha Rice    PHYSICAL EXAM:  General appearance: no apparent distress, alert and cooperative  HEENT: head atraumatic, normocephalic, trachea midline, moist mucous membranes   Neurologic: alert, oriented, normal speech, no focal findings or movement disorder noted  Abdomen: soft, non-tender on palpation, no guarding or rebound   Extremities:  no calf tenderness bilaterally, non-edematous bilaterally   Musculoskeletal: no gross abnormalities, range of motion appropriate for age   Psychiatric: mood appropriate, normal affect   Pelvic Exam:   Vulva: normal appearing vulva, no masses, tenderness or lesions, normal clitoris    Vagina: normal appearing urethral meatus, normal appearing vaginal mucosa with normal color and thick white discharge, no lesions, no vaginal bleeding     Cervix: normal appearing cervix without pathologic appearing discharge or lesions         Lab Results:  Results for orders placed or performed in visit on 12/08/20   POCT urine pregnancy   Result Value Ref Range    Preg Test, Ur neg     Control c-present        Assessment/Plan:  Vaginal Discharge   - VSS   - Spec exam showed thick white discharge, no vaginal bleeding    - Vaginitis and Gc/C collected. Will call her with any abnormal results    - POCT urine pregnancy test ordered    - Depo Provera ordered    - Declined flu vaccine   - Return to office for next well women exam (June 2021) or sooner if needed         Diagnosis Orders   1. Vaginal discharge  POCT urine pregnancy    VAGINITIS DNA PROBE    C.trachomatis N.gonorrhoeae DNA   2. Family planning, Depo-Provera contraception monitoring/administration  medroxyPROGESTERone (DEPO-PROVERA) 150 MG/ML injection     Patient Active Problem List    Diagnosis Date Noted    Asthma 04/30/2015     Priority: Medium     Uses inhaler and nebulizer prn. States well controlled      Tobacco use 04/30/2015     Priority: Medium     Pt counseled on maternal/fetal risk factors. Pt verbalizes understanding   Pt desires to quit  6/8/17- Patient continues to smoke. 8 daily      Vaccine counseling/Gardasil Schedule 07/22/2019 7/22/2019 initial injection  9/22/2019 2nd injection  1/22/2020 3rd injection      Recurrent depression ([facility] [address] 09/08/2018    Marijuana smoker 03/28/2017     Pt counseled not recommended in pregnancy. Maternal/Fetal risks reviewed. Pt verbalizes understanding. 6/8/17- Patient states she's smoking occasionally. Orders Placed This Encounter   Procedures    VAGINITIS DNA PROBE     Standing Status:   Future     Standing Expiration Date:   12/8/2021    C.trachomatis N.gonorrhoeae DNA     Standing Status:   Future     Standing Expiration Date:   12/8/2021    POCT urine pregnancy       Patient was seen with total face to face time of 15 minutes. More than 50% of this visit was counseling and education regarding The primary encounter diagnosis was Vaginal discharge. A diagnosis of Family planning, Depo-Provera contraception monitoring/administration was also pertinent to this visit.  and Follow-up (Family planning restart depo)   as well as counseling on preventative health

## 2020-12-08 NOTE — PROGRESS NOTES
After obtaining consent, and per orders of Dr Emerson Segura, injection of Depo Provera  given in Right upper quad. gluteus by Alfred Chi. Patient instructed to remain in clinic for 20 minutes afterwards, and to report any adverse reaction to me immediately.  Pt will call back to schedule next visit, Next Depo due Feb 23. 2021 - March 9.2021

## 2020-12-09 LAB
DIRECT EXAM: NORMAL
Lab: NORMAL
SPECIMEN DESCRIPTION: NORMAL

## 2021-02-10 ENCOUNTER — TELEPHONE (OUTPATIENT)
Dept: INTERNAL MEDICINE | Age: 31
End: 2021-02-10

## 2021-03-03 ENCOUNTER — TELEPHONE (OUTPATIENT)
Dept: OBGYN | Age: 31
End: 2021-03-03

## 2021-03-03 NOTE — TELEPHONE ENCOUNTER
Patient no showed for 03/03/21 appointment at [address] [address] office. Writer left a voice message for patient to call the office to reschedule appointment.

## 2021-03-10 ENCOUNTER — TELEPHONE (OUTPATIENT)
Dept: OBGYN | Age: 31
End: 2021-03-10

## 2021-03-18 ENCOUNTER — NURSE ONLY (OUTPATIENT)
Dept: OBGYN | Age: 31
End: 2021-03-18
Payer: MEDICARE

## 2021-03-18 DIAGNOSIS — Z30.09 FAMILY PLANNING: Primary | ICD-10-CM

## 2021-03-18 PROCEDURE — 96372 THER/PROPH/DIAG INJ SC/IM: CPT | Performed by: OBSTETRICS & GYNECOLOGY

## 2021-03-18 RX ORDER — MEDROXYPROGESTERONE ACETATE 150 MG/ML
150 INJECTION, SUSPENSION INTRAMUSCULAR ONCE
Status: COMPLETED | OUTPATIENT
Start: 2021-03-18 | End: 2021-03-18

## 2021-03-18 RX ADMIN — MEDROXYPROGESTERONE ACETATE 150 MG: 150 INJECTION, SUSPENSION INTRAMUSCULAR at 11:42

## 2021-03-18 NOTE — PROGRESS NOTES
After obtaining consent, and per orders of Dr. Jeffy Marte, injection of Medroxyprogesterone 150 mg  given in Left upper quad. gluteus by Randolph Aleman. Patient instructed to remain in clinic for 20 minutes afterwards, and to report any adverse reaction to me immediately.

## 2021-04-16 ENCOUNTER — TELEPHONE (OUTPATIENT)
Dept: OBGYN | Age: 31
End: 2021-04-16

## 2021-04-25 ENCOUNTER — APPOINTMENT (OUTPATIENT)
Dept: GENERAL RADIOLOGY | Age: 31
End: 2021-04-25
Payer: MEDICARE

## 2021-04-25 ENCOUNTER — HOSPITAL ENCOUNTER (EMERGENCY)
Age: 31
Discharge: HOME | End: 2021-04-25
Attending: EMERGENCY MEDICINE
Payer: MEDICARE

## 2021-04-25 VITALS
RESPIRATION RATE: 14 BRPM | TEMPERATURE: 98.2 F | SYSTOLIC BLOOD PRESSURE: 120 MMHG | BODY MASS INDEX: 18.88 KG/M2 | WEIGHT: 110 LBS | OXYGEN SATURATION: 100 % | DIASTOLIC BLOOD PRESSURE: 79 MMHG | HEART RATE: 91 BPM
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
## 2021-04-25 PROCEDURE — 99283 EMERGENCY DEPT VISIT LOW MDM: CPT

## 2021-04-25 PROCEDURE — 71045 X-RAY EXAM CHEST 1 VIEW: CPT

## 2021-04-25 PROCEDURE — 72040 X-RAY EXAM NECK SPINE 2-3 VW: CPT

## 2021-04-25 RX ORDER — CYCLOBENZAPRINE HCL 10 MG
10 TABLET ORAL 2 TIMES DAILY PRN
Qty: 12 TABLET | Refills: 0 | Status: SHIPPED | OUTPATIENT
Start: 2021-04-25 | End: 2021-06-29 | Stop reason: SDUPTHER

## 2021-04-25 ASSESSMENT — ENCOUNTER SYMPTOMS
VOMITING: 0
ANAL BLEEDING: 0
SHORTNESS OF BREATH: 0
NAUSEA: 0
BACK PAIN: 1
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
## 2021-04-26 ENCOUNTER — TELEPHONE (OUTPATIENT)
Dept: INTERNAL MEDICINE | Age: 31
End: 2021-04-26

## 2021-04-28 NOTE — TELEPHONE ENCOUNTER
Pt has [facility] gas form received and dx of asthma form completed and waiting on sig / pt has future appointment

## 2021-06-03 ENCOUNTER — OFFICE VISIT (OUTPATIENT)
Dept: INTERNAL MEDICINE | Age: 31
End: 2021-06-03

## 2021-06-03 ENCOUNTER — HOSPITAL ENCOUNTER (OUTPATIENT)
Age: 31
Setting detail: SPECIMEN
Discharge: HOME | End: 2021-06-03

## 2021-06-03 VITALS
WEIGHT: 115 LBS | DIASTOLIC BLOOD PRESSURE: 89 MMHG | HEIGHT: 64 IN | HEART RATE: 110 BPM | BODY MASS INDEX: 19.63 KG/M2 | SYSTOLIC BLOOD PRESSURE: 139 MMHG

## 2021-06-03 DIAGNOSIS — M54.50 CHRONIC MIDLINE LOW BACK PAIN WITHOUT SCIATICA: ICD-10-CM

## 2021-06-03 DIAGNOSIS — F41.9 ANXIETY: Primary | ICD-10-CM

## 2021-06-03 DIAGNOSIS — L70.9 ACNE, UNSPECIFIED ACNE TYPE: ICD-10-CM

## 2021-06-03 DIAGNOSIS — R00.0 TACHYCARDIA: ICD-10-CM

## 2021-06-03 DIAGNOSIS — F41.9 ANXIETY: ICD-10-CM

## 2021-06-03 DIAGNOSIS — J45.40 MODERATE PERSISTENT ASTHMA WITHOUT COMPLICATION: ICD-10-CM

## 2021-06-03 DIAGNOSIS — G89.29 CHRONIC MIDLINE LOW BACK PAIN WITHOUT SCIATICA: ICD-10-CM

## 2021-06-03 LAB
ABSOLUTE EOS #: 0.6 K/UL (ref 0–0.44)
ABSOLUTE IMMATURE GRANULOCYTE: <0.03 K/UL (ref 0–0.3)
ABSOLUTE LYMPH #: 2.2 K/UL (ref 1.1–3.7)
ABSOLUTE MONO #: 0.76 K/UL (ref 0.1–1.2)
ALBUMIN SERPL-MCNC: 4.4 G/DL (ref 3.5–5.2)
ALBUMIN/GLOBULIN RATIO: 1.6 (ref 1–2.5)
ALP BLD-CCNC: 47 U/L (ref 35–104)
ALT SERPL-CCNC: 12 U/L (ref 5–33)
ANION GAP SERPL CALCULATED.3IONS-SCNC: 15 MMOL/L (ref 9–17)
AST SERPL-CCNC: 25 U/L
BASOPHILS # BLD: 2 % (ref 0–2)
BASOPHILS ABSOLUTE: 0.12 K/UL (ref 0–0.2)
BILIRUB SERPL-MCNC: 0.17 MG/DL (ref 0.3–1.2)
BUN BLDV-MCNC: 5 MG/DL (ref 6–20)
BUN/CREAT BLD: ABNORMAL (ref 9–20)
CALCIUM SERPL-MCNC: 9 MG/DL (ref 8.6–10.4)
CHLORIDE BLD-SCNC: 108 MMOL/L (ref 98–107)
CO2: 19 MMOL/L (ref 20–31)
CREAT SERPL-MCNC: 0.66 MG/DL (ref 0.5–0.9)
DIFFERENTIAL TYPE: ABNORMAL
EOSINOPHILS RELATIVE PERCENT: 10 % (ref 1–4)
GFR AFRICAN AMERICAN: >60 ML/MIN
GFR NON-AFRICAN AMERICAN: >60 ML/MIN
GFR SERPL CREATININE-BSD FRML MDRD: ABNORMAL ML/MIN/{1.73_M2}
GFR SERPL CREATININE-BSD FRML MDRD: ABNORMAL ML/MIN/{1.73_M2}
GLUCOSE BLD-MCNC: 87 MG/DL (ref 70–99)
HCT VFR BLD CALC: 42.4 % (ref 36.3–47.1)
HEMOGLOBIN: 13.8 G/DL (ref 11.9–15.1)
IMMATURE GRANULOCYTES: 0 %
LYMPHOCYTES # BLD: 38 % (ref 24–43)
MCH RBC QN AUTO: 30.2 PG (ref 25.2–33.5)
MCHC RBC AUTO-ENTMCNC: 32.5 G/DL (ref 28.4–34.8)
MCV RBC AUTO: 92.8 FL (ref 82.6–102.9)
MONOCYTES # BLD: 13 % (ref 3–12)
NRBC AUTOMATED: 0 PER 100 WBC
PDW BLD-RTO: 11.9 % (ref 11.8–14.4)
PLATELET # BLD: 255 K/UL (ref 138–453)
PLATELET ESTIMATE: ABNORMAL
PMV BLD AUTO: 11.1 FL (ref 8.1–13.5)
POTASSIUM SERPL-SCNC: 4.6 MMOL/L (ref 3.7–5.3)
RBC # BLD: 4.57 M/UL (ref 3.95–5.11)
RBC # BLD: ABNORMAL 10*6/UL
SEG NEUTROPHILS: 37 % (ref 36–65)
SEGMENTED NEUTROPHILS ABSOLUTE COUNT: 2.18 K/UL (ref 1.5–8.1)
SODIUM BLD-SCNC: 142 MMOL/L (ref 135–144)
TOTAL PROTEIN: 7.2 G/DL (ref 6.4–8.3)
TSH SERPL DL<=0.05 MIU/L-ACNC: 0.37 MIU/L (ref 0.3–5)
VITAMIN D 25-HYDROXY: 24.9 NG/ML (ref 30–100)
WBC # BLD: 5.9 K/UL (ref 3.5–11.3)
WBC # BLD: ABNORMAL 10*3/UL

## 2021-06-03 PROCEDURE — 99214 OFFICE O/P EST MOD 30 MIN: CPT | Performed by: INTERNAL MEDICINE

## 2021-06-03 RX ORDER — ALBUTEROL SULFATE 90 UG/1
AEROSOL, METERED RESPIRATORY (INHALATION)
Qty: 18 G | Refills: 11 | Status: SHIPPED | OUTPATIENT
Start: 2021-06-03 | End: 2022-11-01 | Stop reason: SDUPTHER

## 2021-06-03 RX ORDER — ERYTHROMYCIN AND BENZOYL PEROXIDE 30; 50 MG/G; MG/G
GEL TOPICAL
Qty: 23.3 G | Refills: 5 | Status: SHIPPED | OUTPATIENT
Start: 2021-06-03

## 2021-06-03 RX ORDER — CITALOPRAM 20 MG/1
20 TABLET ORAL DAILY
Qty: 30 TABLET | Refills: 0 | Status: SHIPPED | OUTPATIENT
Start: 2021-06-03 | End: 2021-06-29 | Stop reason: SDUPTHER

## 2021-06-03 RX ORDER — CYCLOBENZAPRINE HCL 5 MG
5 TABLET ORAL NIGHTLY PRN
Qty: 15 TABLET | Refills: 0 | Status: SHIPPED | OUTPATIENT
Start: 2021-06-03 | End: 2021-06-13

## 2021-06-03 SDOH — ECONOMIC STABILITY: FOOD INSECURITY: WITHIN THE PAST 12 MONTHS, YOU WORRIED THAT YOUR FOOD WOULD RUN OUT BEFORE YOU GOT MONEY TO BUY MORE.: NEVER TRUE

## 2021-06-03 SDOH — ECONOMIC STABILITY: TRANSPORTATION INSECURITY
IN THE PAST 12 MONTHS, HAS LACK OF TRANSPORTATION KEPT YOU FROM MEETINGS, WORK, OR FROM GETTING THINGS NEEDED FOR DAILY LIVING?: NO

## 2021-06-03 SDOH — ECONOMIC STABILITY: FOOD INSECURITY: WITHIN THE PAST 12 MONTHS, THE FOOD YOU BOUGHT JUST DIDN'T LAST AND YOU DIDN'T HAVE MONEY TO GET MORE.: NEVER TRUE

## 2021-06-03 SDOH — ECONOMIC STABILITY: TRANSPORTATION INSECURITY
IN THE PAST 12 MONTHS, HAS THE LACK OF TRANSPORTATION KEPT YOU FROM MEDICAL APPOINTMENTS OR FROM GETTING MEDICATIONS?: NO

## 2021-06-03 ASSESSMENT — ENCOUNTER SYMPTOMS
BACK PAIN: 1
COUGH: 0
CHEST TIGHTNESS: 0
ABDOMINAL PAIN: 0
SHORTNESS OF BREATH: 0
CONSTIPATION: 0
DIARRHEA: 0

## 2021-06-03 ASSESSMENT — SOCIAL DETERMINANTS OF HEALTH (SDOH): HOW HARD IS IT FOR YOU TO PAY FOR THE VERY BASICS LIKE FOOD, HOUSING, MEDICAL CARE, AND HEATING?: NOT HARD AT ALL

## 2021-06-03 NOTE — PATIENT INSTRUCTIONS
Script for lab given to pt, no fasting required. Pt will get labs done before next appt. Patient to return to clinic 2 weeks (6/22/21)  AVS reviewed and given to pt. HEART/VASCULAR INSTRUCTIONS    Your doctor has ordered a/an EKG for you, this will be done at the [place] [address] [facility] located at [address] [place] [address], across the street from [facility]. On the day of your appointment, please report to Registration on the main floor of the [place] [facility] [facility], 20 minutes before your test to complete any needed paperwork. Day:      Date:        Time:    If you cannot keep this appointment, please call [phone] to cancel and reschedule your appointment. It is very important for your care that you keep your appointment. If for some reason you are unable to keep your appointment it is equally important that you call our office at [phone] to cancel your appointment and reschedule. Failure to do so may result in your termination from our practice.   MB
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
## 2021-06-04 ENCOUNTER — TELEPHONE (OUTPATIENT)
Dept: INTERNAL MEDICINE | Age: 31
End: 2021-06-04

## 2021-06-07 RX ORDER — PRENATAL VIT NO.126/IRON/FOLIC 28MG-0.8MG
1 TABLET ORAL DAILY
Qty: 30 TABLET | Refills: 5 | Status: SHIPPED | OUTPATIENT
Start: 2021-06-07 | End: 2021-06-29 | Stop reason: SDUPTHER

## 2021-06-16 ENCOUNTER — TELEPHONE (OUTPATIENT)
Dept: OBGYN | Age: 31
End: 2021-06-16

## 2021-06-22 ENCOUNTER — TELEPHONE (OUTPATIENT)
Dept: INTERNAL MEDICINE | Age: 31
End: 2021-06-22

## 2021-06-29 ENCOUNTER — OFFICE VISIT (OUTPATIENT)
Dept: INTERNAL MEDICINE | Age: 31
End: 2021-06-29

## 2021-06-29 VITALS
HEART RATE: 72 BPM | WEIGHT: 113.2 LBS | BODY MASS INDEX: 19.33 KG/M2 | DIASTOLIC BLOOD PRESSURE: 86 MMHG | SYSTOLIC BLOOD PRESSURE: 133 MMHG | HEIGHT: 64 IN

## 2021-06-29 DIAGNOSIS — M54.9 BACK PAIN, UNSPECIFIED BACK LOCATION, UNSPECIFIED BACK PAIN LATERALITY, UNSPECIFIED CHRONICITY: Primary | ICD-10-CM

## 2021-06-29 DIAGNOSIS — F41.9 ANXIETY: ICD-10-CM

## 2021-06-29 PROCEDURE — 99213 OFFICE O/P EST LOW 20 MIN: CPT | Performed by: INTERNAL MEDICINE

## 2021-06-29 RX ORDER — CITALOPRAM 40 MG/1
40 TABLET ORAL DAILY
Qty: 30 TABLET | Refills: 3 | Status: SHIPPED | OUTPATIENT
Start: 2021-06-29 | End: 2021-10-14

## 2021-06-29 RX ORDER — CYCLOBENZAPRINE HCL 5 MG
5 TABLET ORAL NIGHTLY PRN
Qty: 15 TABLET | Refills: 0 | Status: SHIPPED | OUTPATIENT
Start: 2021-06-29 | End: 2021-07-09

## 2021-06-29 RX ORDER — PRENATAL VIT NO.126/IRON/FOLIC 28MG-0.8MG
1 TABLET ORAL DAILY
Qty: 30 TABLET | Refills: 5 | Status: SHIPPED | OUTPATIENT
Start: 2021-06-29

## 2021-06-29 ASSESSMENT — ENCOUNTER SYMPTOMS
RESPIRATORY NEGATIVE: 1
GASTROINTESTINAL NEGATIVE: 1
EYES NEGATIVE: 1
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
## 2021-06-29 NOTE — PATIENT INSTRUCTIONS
Medications e-scribe to pharmacy of pt's choice. Patient to return to clinic 3 months (office will call and schedule appt). AVS reviewed and given to pt. It is very important for your care that you keep your appointment. If for some reason you are unable to keep your appointment it is equally important that you call our office at [phone] to cancel your appointment and reschedule. Failure to do so may result in your termination from our practice.   MB

## 2021-09-08 ENCOUNTER — HOSPITAL ENCOUNTER (OUTPATIENT)
Age: 31
Setting detail: SPECIMEN
Discharge: HOME | End: 2021-09-08
Payer: MEDICARE

## 2021-09-08 ENCOUNTER — OFFICE VISIT (OUTPATIENT)
Dept: OBGYN | Age: 31
End: 2021-09-08
Payer: MEDICARE

## 2021-09-08 VITALS
BODY MASS INDEX: 19.46 KG/M2 | DIASTOLIC BLOOD PRESSURE: 73 MMHG | HEIGHT: 64 IN | SYSTOLIC BLOOD PRESSURE: 120 MMHG | HEART RATE: 77 BPM | WEIGHT: 114 LBS

## 2021-09-08 DIAGNOSIS — N94.6 DYSMENORRHEA: ICD-10-CM

## 2021-09-08 DIAGNOSIS — Z01.419 WELL WOMAN EXAM: Primary | ICD-10-CM

## 2021-09-08 DIAGNOSIS — Z01.419 WELL WOMAN EXAM: ICD-10-CM

## 2021-09-08 LAB
CONTROL: PRESENT
HAV IGM SER IA-ACNC: NONREACTIVE
HEPATITIS B CORE IGM ANTIBODY: NONREACTIVE
HEPATITIS B SURFACE ANTIGEN: NONREACTIVE
HEPATITIS C ANTIBODY: NONREACTIVE
HIV AG/AB: NONREACTIVE
PREGNANCY TEST URINE, POC: NORMAL

## 2021-09-08 PROCEDURE — 96372 THER/PROPH/DIAG INJ SC/IM: CPT | Performed by: STUDENT IN AN ORGANIZED HEALTH CARE EDUCATION/TRAINING PROGRAM

## 2021-09-08 PROCEDURE — 99395 PREV VISIT EST AGE 18-39: CPT | Performed by: STUDENT IN AN ORGANIZED HEALTH CARE EDUCATION/TRAINING PROGRAM

## 2021-09-08 PROCEDURE — 81025 URINE PREGNANCY TEST: CPT | Performed by: STUDENT IN AN ORGANIZED HEALTH CARE EDUCATION/TRAINING PROGRAM

## 2021-09-08 PROCEDURE — 99211 OFF/OP EST MAY X REQ PHY/QHP: CPT | Performed by: STUDENT IN AN ORGANIZED HEALTH CARE EDUCATION/TRAINING PROGRAM

## 2021-09-08 RX ORDER — MEDROXYPROGESTERONE ACETATE 150 MG/ML
150 INJECTION, SUSPENSION INTRAMUSCULAR
Qty: 1 ML | Refills: 3 | Status: SHIPPED | OUTPATIENT
Start: 2021-09-08

## 2021-09-08 RX ORDER — MEDROXYPROGESTERONE ACETATE 150 MG/ML
150 INJECTION, SUSPENSION INTRAMUSCULAR ONCE
Status: COMPLETED | OUTPATIENT
Start: 2021-09-08 | End: 2021-09-08

## 2021-09-08 RX ADMIN — MEDROXYPROGESTERONE ACETATE 150 MG: 150 INJECTION, SUSPENSION INTRAMUSCULAR at 15:14

## 2021-09-08 NOTE — PROGRESS NOTES
bleeding/clotting disorder  Immunologic: negative recent illness, negative recent sick contact, negative allergic reactions  Lymphatic: negative lymph nodes  Musculoskeletal: negative back pain, negative myalgias, negative arthralgias  Neurological:  negative dizziness, negative weakness  Behavior/Psych: negative depression, negative anxiety    GYNECOLOGICAL HISTORY:  Age of Menarche: 6    Sexually Active: has sex with males  STD History: Gonorrhea more than 1 year     Pap History: Last PAP was normal; May/2018. Colposcopy History: denies    Permanent Sterilization: no  Reversible Birth Control: no  Hormone Replacement Exposure: no    OBSTETRICAL HISTORY:  OB History    Para Term  AB Living   2 2 2 0 0 2   SAB TAB Ectopic Molar Multiple Live Births   0 0 0 0 0 2      # Outcome Date GA Lbr Damien/2nd Weight Sex Delivery Anes PTL Lv   2 Term 17 37w0d  5 lb 8.5 oz (2.509 kg) M Vag-Spont   JAMES      Birth Comments: APG /   1 Term 01/09/10 40w0d  6 lb 6 oz (2.892 kg) M Vag-Spont None  JAMES       PAST MEDICAL HISTORY:   has a past medical history of Asthma, H/O chlamydia infection, Marijuana smoker, and Postpartum depression. PAST SURGICAL HISTORY:   has a past surgical history that includes osteotomy (Bilateral, 12/10/2015). ALLERGIES:  has No Known Allergies. MEDICATIONS:  Prior to Admission medications    Medication Sig Start Date End Date Taking?  Authorizing Provider   medroxyPROGESTERone (DEPO-PROVERA) 150 MG/ML injection Inject 1 mL into the muscle every 3 months 21  Yes Jessica Gatica DO   Prenatal Vit-Fe Fumarate-FA (CLASSIC PRENATAL) 28-0.8 MG TABS Take 1 tablet by mouth daily 21  Yes Andrea Tamez MD   citalopram (CELEXA) 40 MG tablet Take 1 tablet by mouth daily 21  Yes Andrea Tamez MD   mometasone-formoterol (DULERA) 100-5 MCG/ACT inhaler inhale 2 puffs by mouth every 12 hours 6/3/21  Yes Andrea Tamez MD   benzoyl peroxide-erythromycin (BENZAMYCIN) 5-3 % gel apply to affected area twice a day 6/3/21  Yes Mary Velez MD   albuterol sulfate HFA (VENTOLIN HFA) 108 (90 Base) MCG/ACT inhaler inhale 2 puffs by mouth every 4 hours if needed 6/3/21   Mary Velez MD   medroxyPROGESTERone (DEPO-PROVERA) 150 MG/ML injection Inject 1 mL into the muscle once for 1 dose 12/8/20 12/8/20  Raoul Ahuja DO       FAMILY HISTORY:  family history includes Hypertension in her father; No Known Problems in her maternal grandfather, maternal grandmother, paternal grandfather, paternal grandmother, and sister; Other in her brother and mother. SOCIAL HISTORY:   reports that she has been smoking cigarettes. She has smoked for the past 13.00 years. She has never used smokeless tobacco. She reports current alcohol use. She reports that she does not use drugs. VITALS:  Vitals:    09/08/21 1317   BP: 120/73   Site: Left Upper Arm   Position: Sitting   Cuff Size: Medium Adult   Pulse: 77   Weight: 114 lb (51.7 kg)   Height: 5' 4\" (1.626 m)                                                                                                                                                                      PHYSICAL EXAM:   General Appearance: Appears healthy. Alert; in no acute distress. Pleasant. Skin: Skin color, texture, turgor normal. No rashes or lesions. Lymphatic: No abnormally enlarged lymph nodes. HEENT: normocephalic and atraumatic, Thyroid normal to inspection and palpation  Respiratory: Normal expansion. Clear to auscultation. No rales, rhonchi, or wheezing.   Cardiovascular: normal rate, normal S1 and S2, no gallops, intact distal pulses and no carotid bruits  Breast: non-tender, no masses, lesions, dimpling, or galactorrhea   Abdomen: soft, non-tender, non-distended, no right upper quadrant tenderness and no CVA tenderness  Pelvic Exam:   Chaperone for Intimate Exam: Chaperone was present for entire exam, Chaperone Name: Geni Caro  External genitalia: General appearance; normal, Hair distribution; normal, Lesions absent  Urinary system: urethral meatus normal  Vaginal: normal mucosa, no discharge  Cervix: normal appearing cervix without discharge or lesions  Adnexa: normal adnexa in size, nontender and no masses  Uterus: normal single, nontender  Rectal Exam: exam declined by patient  Musculoskeletal: no gross abnormalities  Extremities: non-tender BLE and non-edematous  Psych:  oriented to time, place and person       DATA:  No results found for this visit on 21. ASSESSMENT & PLAN:    Gianna Hastings is a 27 y.o. female    - VSS, Afebrile   - PAP collected   - She requested STD testing, HIV, Pall, Hep panel, Gc/C, Vaginitis Probe ordered   - Discussed the Garidisil immunization. Patient states she would like to think about it   - She would like to be restarted on Depo-Provera. She has been in the past. Depo ordered   - She follows with PCP for chronic medical conditions     Patient Active Problem List    Diagnosis Date Noted    Asthma 2015     Priority: Medium     Uses inhaler and nebulizer prn. States well controlled      Tobacco use 2015     Priority: Medium     Pt counseled on maternal/fetal risk factors. Pt verbalizes understanding   Pt desires to quit  17- Patient continues to smoke. 8 daily      Vaccine counseling/Gardasil Schedule 2019 initial injection  2019 2nd injection  2020 3rd injection      Recurrent depression ([facility] [address] 2018    Marijuana smoker 2017     Pt counseled not recommended in pregnancy. Maternal/Fetal risks reviewed. Pt verbalizes understanding. 17- Patient states she's smoking occasionally. Return in about 1 year (around 2022) for Annual.    Counseling Completed:    discussed need for repeat pap as per American Society for Colposcopy and Cervical Pathology guidelines.   discussed need for mammograms every 1 year, If >44 yo and last mammogram was negative. discussed Calcium and Vitamin D dosing. discussed need for colonoscopy screening as well as onset for bone density testing. discussed birth control and barrier recommendations. discussed STD counseling and prevention. discussed Gardisil counseling for all patients 9-25 yo. Hereditary Breast, Ovarian, Colon and Uterine Cancer screening discussed. Tobacco & Secondary smoke risks discussed; with recommendation for cessation and avoidance. Routine health maintenance per patients PCP discussed. Patient was seen with total face to face time of 20 minutes. More than 50% of this visit was on counseling and education regarding the problems listed below and her options. She was also counseled on her preventative health maintenance recommendations and follow-up.     Courtney Rubio DO  Ob/Gyn Resident  [facility], [place]  9/8/2021, 2:07 PM

## 2021-09-09 LAB
C TRACH DNA GENITAL QL NAA+PROBE: NEGATIVE
DIRECT EXAM: NORMAL
Lab: NORMAL
N. GONORRHOEAE DNA: NEGATIVE
SPECIMEN DESCRIPTION: NORMAL
SPECIMEN DESCRIPTION: NORMAL
T. PALLIDUM, IGG: NONREACTIVE

## 2021-09-10 LAB
HPV SAMPLE: NORMAL
HPV, GENOTYPE 16: NOT DETECTED
HPV, GENOTYPE 18: NOT DETECTED
HPV, HIGH RISK OTHER: NOT DETECTED
HPV, INTERPRETATION: NORMAL
SPECIMEN DESCRIPTION: NORMAL

## 2021-09-15 LAB — CYTOLOGY REPORT: NORMAL

## 2021-10-14 DIAGNOSIS — F41.9 ANXIETY: ICD-10-CM

## 2021-10-14 RX ORDER — CITALOPRAM 20 MG/1
TABLET ORAL
Qty: 30 TABLET | Refills: 0 | Status: SHIPPED | OUTPATIENT
Start: 2021-10-14 | End: 2021-11-09

## 2021-10-14 NOTE — TELEPHONE ENCOUNTER
E-scribe request from [facility] for Citalopram 20 mg. Patient has an appt on 11/9/21.       Health Maintenance   Topic Date Due    Pneumococcal 0-64 years Vaccine (1 of 2 - PPSV23) Never done    COVID-19 Vaccine (1) Never done    Hepatitis A vaccine (2 of 2 - 2-dose series) 02/06/2009    Flu vaccine (1) 09/01/2021    Cervical cancer screen  09/08/2026    DTaP/Tdap/Td vaccine (7 - Td or Tdap) 07/26/2028    Hepatitis B vaccine  Completed    Hib vaccine  Completed    Hepatitis C screen  Completed    HIV screen  Completed    Meningococcal (ACWY) vaccine  Aged Out    Varicella vaccine  Discontinued             (applicable per patient's age: Cancer Screenings, Depression Screening, Fall Risk Screening, Immunizations)    LDL Cholesterol (mg/dL)   Date Value   10/15/2014 81     AST (U/L)   Date Value   06/03/2021 25     ALT (U/L)   Date Value   06/03/2021 12     BUN (mg/dL)   Date Value   06/03/2021 5 (L)      (goal A1C is < 7)   (goal LDL is <100) need 30-50% reduction from baseline     BP Readings from Last 3 Encounters:   09/08/21 120/73   06/29/21 133/86   06/03/21 139/89    (goal /80)      All Future Testing planned in [facility]:  Lab Frequency Next Occurrence   EKG 12 lead Once 12/22/2021       Next Visit Date:  Future Appointments   Date Time Provider Jared Mandujano   11/9/2021  1:40 PM Melissa Slater MD [facility]   12/1/2021  3:30 PM SCHEDULE, [facility] OB[facility]N [facility]yn [facility]            Patient Active Problem List:     Asthma     Tobacco use     Marijuana smoker     Vaccine counseling/Gardasil Schedule     Recurrent depression ([address]

## 2021-10-30 ENCOUNTER — HOSPITAL ENCOUNTER (EMERGENCY)
Age: 31
Discharge: HOME | End: 2021-10-30
Attending: EMERGENCY MEDICINE
Payer: MEDICARE

## 2021-10-30 VITALS
BODY MASS INDEX: 19.63 KG/M2 | OXYGEN SATURATION: 97 % | TEMPERATURE: 98.1 F | WEIGHT: 115 LBS | HEIGHT: 64 IN | HEART RATE: 103 BPM | RESPIRATION RATE: 20 BRPM | SYSTOLIC BLOOD PRESSURE: 128 MMHG | DIASTOLIC BLOOD PRESSURE: 80 MMHG

## 2021-10-30 DIAGNOSIS — N76.0 BACTERIAL VAGINOSIS: ICD-10-CM

## 2021-10-30 DIAGNOSIS — N89.8 VAGINAL DISCHARGE: Primary | ICD-10-CM

## 2021-10-30 DIAGNOSIS — B96.89 BACTERIAL VAGINOSIS: ICD-10-CM

## 2021-10-30 LAB
DIRECT EXAM: ABNORMAL
HCG(URINE) PREGNANCY TEST: NEGATIVE
Lab: ABNORMAL
SPECIMEN DESCRIPTION: ABNORMAL

## 2021-10-30 PROCEDURE — 87480 CANDIDA DNA DIR PROBE: CPT

## 2021-10-30 PROCEDURE — 87591 N.GONORRHOEAE DNA AMP PROB: CPT

## 2021-10-30 PROCEDURE — 81025 URINE PREGNANCY TEST: CPT

## 2021-10-30 PROCEDURE — 99282 EMERGENCY DEPT VISIT SF MDM: CPT

## 2021-10-30 PROCEDURE — 87491 CHLMYD TRACH DNA AMP PROBE: CPT

## 2021-10-30 PROCEDURE — 87510 GARDNER VAG DNA DIR PROBE: CPT

## 2021-10-30 PROCEDURE — 87660 TRICHOMONAS VAGIN DIR PROBE: CPT

## 2021-10-30 RX ORDER — METRONIDAZOLE 500 MG/1
500 TABLET ORAL 2 TIMES DAILY
Qty: 14 TABLET | Refills: 0 | Status: SHIPPED | OUTPATIENT
Start: 2021-10-30 | End: 2021-11-06

## 2021-10-30 RX ORDER — METRONIDAZOLE 500 MG/1
500 TABLET ORAL 2 TIMES DAILY
Qty: 14 TABLET | Refills: 0 | Status: SHIPPED | OUTPATIENT
Start: 2021-10-30 | End: 2021-10-30 | Stop reason: SDUPTHER

## 2021-10-30 ASSESSMENT — ENCOUNTER SYMPTOMS
EYE PAIN: 0
CONSTIPATION: 0
SINUS PRESSURE: 0
COUGH: 0
NAUSEA: 0
SINUS PAIN: 0
ABDOMINAL DISTENTION: 0
DIARRHEA: 0
SORE THROAT: 0
ABDOMINAL PAIN: 0
SHORTNESS OF BREATH: 0
EYE ITCHING: 0
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
## 2021-10-30 NOTE — ED PROVIDER NOTES
[facility]  ED  Emergency Department Encounter  EmergencyMedicine Resident     Pt Rowan Chandler  MRN: [id number]  [place] 1990  Date of evaluation: 10/30/21  PCP:  Semaj Jaeger MD    This patient was evaluated in the Emergency Department for symptoms described in the history of present illness. The patient was evaluated in the context of the global COVID-19 pandemic, which necessitated consideration that the patient might be at risk for infection with the SARS-CoV-2 virus that causes COVID-19. Institutional protocols and algorithms that pertain to the evaluation of patients at risk for COVID-19 are in a state of rapid change based on information released by regulatory bodies including the CDC and federal and state organizations. These policies and algorithms were followed during the patient's care in the ED. CHIEF COMPLAINT       Chief Complaint   Patient presents with    Vaginal Itching     odor    Vaginal Discharge       HISTORY OF PRESENT ILLNESS  (Location/Symptom, Timing/Onset, Context/Setting, Quality, Duration, Modifying Factors, Severity.)      Phil White is a 32 y.o. female who presents with vaginal discharge and odor of 4 days duration. She denies any bleeding, no urinary symptoms or bowel changes. Denies abdominal pain or any recent fevers, coughs or colds. Medical history includes recurrent bacterial vaginosis and remote history of chlamydia infection. Denies concern for pregnancy. Not taking any medications daily. NKDA    PAST MEDICAL / SURGICAL / SOCIAL / FAMILY HISTORY      has a past medical history of Asthma, H/O chlamydia infection, Marijuana smoker, and Postpartum depression. has a past surgical history that includes osteotomy (Bilateral, 12/10/2015).       Social History     Socioeconomic History    Marital status: Single     Spouse name: Not on file    Number of children: Not on file    Years of education: Not on file    Highest education level: Not on file   Occupational History    Not on file   Tobacco Use    Smoking status: Current Some Day Smoker     Years: 13.00     Types: Cigarettes    Smokeless tobacco: Never Used    Tobacco comment: 2-3 cigs per day   Vaping Use    Vaping Use: Never assessed   Substance and Sexual Activity    Alcohol use: Yes     Comment: occasional    Drug use: No     Types: Marijuana    Sexual activity: Yes     Partners: Male   Other Topics Concern    Not on file   Social History Narrative    Currently lives with her son and his father. Feels safe at home. Does not work. Started college but did not finish. Social Determinants of Health     Financial Resource Strain: Low Risk     Difficulty of Paying Living Expenses: Not hard at all   Food Insecurity: No Food Insecurity    Worried About Running Out of Food in the Last Year: Never true    Nory of Food in the Last Year: Never true   Transportation Needs: No Transportation Needs    Lack of Transportation (Medical): No    Lack of Transportation (Non-Medical):  No   Physical Activity:     Days of Exercise per Week:     Minutes of Exercise per Session:    Stress:     Feeling of Stress :    Social Connections:     Frequency of Communication with Friends and Family:     Frequency of Social Gatherings with Friends and Family:     Attends [employer] Services:     Active Member of Clubs or Organizations:     Attends Club or Organization Meetings:     Marital Status:    Intimate Partner Violence:     Fear of Current or Ex-Partner:     Emotionally Abused:     Physically Abused:     Sexually Abused:        Family History   Problem Relation Age of Onset    Other Mother         Chrons    Hypertension Father     No Known Problems Sister         3 sisters with asthma    Other Brother         2 brothers in good health    No Known Problems Maternal Grandmother     No Known Problems Maternal Grandfather     No Known Problems Paternal Grandmother     No Known Problems Paternal Grandfather        Allergies:  Patient has no known allergies. Home Medications:  Prior to Admission medications    Medication Sig Start Date End Date Taking? Authorizing Provider   metroNIDAZOLE (FLAGYL) 500 MG tablet Take 1 tablet by mouth 2 times daily for 7 days 10/30/21 11/6/21 Yes Jessica Woodson DO   citalopram (CELEXA) 20 MG tablet TAKE 1 TABLET BY MOUTH EVERY DAY 10/14/21   Kristel Castro MD   medroxyPROGESTERone (DEPO-PROVERA) 150 MG/ML injection Inject 1 mL into the muscle every 3 months 9/8/21   Jerald Wolf DO   Prenatal Vit-Fe Fumarate-FA (CLASSIC PRENATAL) 28-0.8 MG TABS Take 1 tablet by mouth daily 6/29/21   Kristel Castro MD   albuterol sulfate HFA (VENTOLIN HFA) 108 (90 Base) MCG/ACT inhaler inhale 2 puffs by mouth every 4 hours if needed 6/3/21   Kristel Castro MD   mometasone-formoterol (DULERA) 100-5 MCG/ACT inhaler inhale 2 puffs by mouth every 12 hours 6/3/21   Kristel Castro MD   benzoyl peroxide-erythromycin (BENZAMYCIN) 5-3 % gel apply to affected area twice a day 6/3/21   Kristel Castro MD   medroxyPROGESTERone (DEPO-PROVERA) 150 MG/ML injection Inject 1 mL into the muscle once for 1 dose 12/8/20 12/8/20  Carla Vences DO       REVIEW OF SYSTEMS    (2-9 systems for level 4, 10 or more for level 5)      Review of Systems   Constitutional: Negative for activity change, chills and fever. HENT: Negative for congestion, sinus pressure, sinus pain and sore throat. Eyes: Negative for pain and itching. Respiratory: Negative for cough and shortness of breath. Cardiovascular: Negative for chest pain. Gastrointestinal: Negative for abdominal distention, abdominal pain, constipation, diarrhea and nausea. Endocrine: Negative for polyuria. Genitourinary: Positive for vaginal discharge. Negative for decreased urine volume, dysuria, frequency, menstrual problem, pelvic pain, vaginal bleeding and vaginal pain. Musculoskeletal: Negative for arthralgias. Skin: Negative for rash. Neurological: Negative for light-headedness and headaches. PHYSICAL EXAM   (up to 7 for level 4, 8 or more for level 5)      INITIAL VITALS:   /80   Pulse 103   Temp 98.1 °F (36.7 °C) (Temporal)   Resp 20   Ht 5' 4\" (1.626 m)   Wt 115 lb (52.2 kg)   SpO2 97%   BMI 19.74 kg/m²     Physical Exam  Vitals reviewed. Constitutional:       General: She is not in acute distress. HENT:      Head: Normocephalic and atraumatic. Ears:      Comments: Hearing grossly normal     Nose: Nose normal.      Mouth/Throat:      Mouth: Mucous membranes are moist.      Pharynx: Oropharynx is clear. Eyes:      General: No scleral icterus. Conjunctiva/sclera: Conjunctivae normal.      Pupils: Pupils are equal, round, and reactive to light. Cardiovascular:      Rate and Rhythm: Normal rate and regular rhythm. Pulses: Normal pulses. Pulmonary:      Effort: Pulmonary effort is normal. No respiratory distress. Breath sounds: Normal breath sounds. Abdominal:      General: Abdomen is flat. There is no distension. Palpations: Abdomen is soft. Tenderness: There is no abdominal tenderness. There is no guarding. Genitourinary:     Comments: External genitalia normal. Vaginal mucosa healthy w/o signs of trauma. Moderate amount of thin white discharge in the vaginal vault. Cervix, appears to be closed and w/o lesions. Bimanual exam no adnexal masses palpated and no cervical motion tenderness. Musculoskeletal:      Cervical back: No muscular tenderness. Right lower leg: No edema. Left lower leg: No edema. Skin:     General: Skin is warm and dry. Capillary Refill: Capillary refill takes less than 2 seconds. Neurological:      General: No focal deficit present. Mental Status: She is alert and oriented to person, place, and time. Mental status is at baseline.          DIFFERENTIAL  DIAGNOSIS     PLAN (LABS / IMAGING / EKG):  Orders Placed This Encounter   Procedures    VAGINITIS DNA PROBE    C.trachomatis N.gonorrhoeae DNA    PREGNANCY, URINE    Vaginal exam       MEDICATIONS ORDERED:  Orders Placed This Encounter   Medications    metroNIDAZOLE (FLAGYL) 500 MG tablet     Sig: Take 1 tablet by mouth 2 times daily for 7 days     Dispense:  14 tablet     Refill:  0       DIAGNOSTIC RESULTS / EMERGENCY DEPARTMENT COURSE / MDM   LAB RESULTS:  Results for orders placed or performed during the hospital encounter of 10/30/21   VAGINITIS DNA PROBE    Specimen: Vaginal   Result Value Ref Range    Specimen Description . VAGINA     Special Requests NOT REPORTED     Direct Exam POSITIVE for Gardnerella vaginalis. (A)     Direct Exam NEGATIVE for Trichomonas vaginalis     Direct Exam NEGATIVE for Candida sp. Direct Exam       Method of testing is a DNA probe intended for detection and identification of Candida species, Gardnerella vaginalis, and Trichomonas vaginalis nucleic acid in vaginal fluid specimens from patients with symptoms of vaginitis/vaginosis. PREGNANCY, URINE   Result Value Ref Range    HCG(Urine) Pregnancy Test NEGATIVE NEGATIVE       IMPRESSION: Tyra Lewis is a 32 y.o. woman presenting for vaginal discharge without bleeding and without abdominal pain. Will perform pelvic exam and send vaginal cultures and rule out pregnancy. RADIOLOGY:  none    EKG  none    All EKG's are interpreted by the Emergency Department Physician who either signs or Co-signs this chart in the absence of a cardiologist.    EMERGENCY DEPARTMENT COURSE:  Patient seen and evaluated, VSS and nontoxic in appearance. Patient reported that she had to leave prior to results of vaginal cultures to support her son at a football game so she was discharged prior to work-up resulting. Pregnancy test was negative. Vaginitis swab was positive for BV. She was called and informed of her results and a prescription was sent to her pharmacy. Pt voiced understanding.   She agrees to return to the emergency department for any new or worsening symptoms. PROCEDURES:  none    CONSULTS:  None    CRITICAL CARE:  See attending note    FINAL IMPRESSION      1. Vaginal discharge    2.  Bacterial vaginosis          DISPOSITION / PLAN     DISPOSITION Decision To Discharge 10/30/2021 06:26:37 PM      PATIENT REFERRED TO:  Troy Banuelos MD  [facility] [address] [facility] [address] [phone]      As needed, If symptoms worsen    [facility]  [address] [place] [address]  [phone]    As needed, If symptoms worsen      DISCHARGE MEDICATIONS:  Discharge Medication List as of 10/30/2021  6:28 PM          Jevon Almeida DO  Emergency Medicine Resident    (Please note that portions of thisnote were completed with a voice recognition program.  Efforts were made to edit the dictations but occasionally words are mis-transcribed.)       Jevon Almeida DO  Resident  10/30/21 [id number]

## 2021-11-05 DIAGNOSIS — F41.9 ANXIETY: ICD-10-CM

## 2021-11-05 NOTE — TELEPHONE ENCOUNTER
E-scribe request from [facility] for Citalopram 20 mg. Patient has an appt on 11/9/21.       Health Maintenance   Topic Date Due    Pneumococcal 0-64 years Vaccine (1 of 2 - PPSV23) Never done    COVID-19 Vaccine (1) Never done    Hepatitis A vaccine (2 of 2 - 2-dose series) 02/06/2009    Flu vaccine (1) 09/01/2021    Cervical cancer screen  09/08/2026    DTaP/Tdap/Td vaccine (7 - Td or Tdap) 07/26/2028    Hepatitis B vaccine  Completed    Hib vaccine  Completed    Hepatitis C screen  Completed    HIV screen  Completed    Meningococcal (ACWY) vaccine  Aged Out    Varicella vaccine  Discontinued             (applicable per patient's age: Cancer Screenings, Depression Screening, Fall Risk Screening, Immunizations)    LDL Cholesterol (mg/dL)   Date Value   10/15/2014 81     AST (U/L)   Date Value   06/03/2021 25     ALT (U/L)   Date Value   06/03/2021 12     BUN (mg/dL)   Date Value   06/03/2021 5 (L)      (goal A1C is < 7)   (goal LDL is <100) need 30-50% reduction from baseline     BP Readings from Last 3 Encounters:   10/30/21 128/80   09/08/21 120/73   06/29/21 133/86    (goal /80)      All Future Testing planned in [facility]:  Lab Frequency Next Occurrence   EKG 12 lead Once 12/22/2021       Next Visit Date:  Future Appointments   Date Time Provider Jared Mandujano   11/9/2021  1:40 PM Alana Tucker MD [email or web address]   12/1/2021  3:30 PM SCHEDULE, [email or web address]            Patient Active Problem List:     Asthma     Tobacco use     Marijuana smoker     Vaccine counseling/Gardasil Schedule     Recurrent depression ([address]

## 2021-11-09 ENCOUNTER — VIRTUAL VISIT (OUTPATIENT)
Dept: INTERNAL MEDICINE | Age: 31
End: 2021-11-09
Payer: MEDICARE

## 2021-11-09 DIAGNOSIS — F41.9 ANXIETY: ICD-10-CM

## 2021-11-09 DIAGNOSIS — M54.9 BACK PAIN, UNSPECIFIED BACK LOCATION, UNSPECIFIED BACK PAIN LATERALITY, UNSPECIFIED CHRONICITY: Primary | ICD-10-CM

## 2021-11-09 PROCEDURE — 99442 PR PHYS/QHP TELEPHONE EVALUATION 11-20 MIN: CPT | Performed by: INTERNAL MEDICINE

## 2021-11-09 RX ORDER — CITALOPRAM 20 MG/1
TABLET ORAL
Qty: 30 TABLET | Refills: 0 | Status: SHIPPED | OUTPATIENT
Start: 2021-11-09 | End: 2021-12-02

## 2021-11-09 RX ORDER — CYCLOBENZAPRINE HCL 5 MG
5 TABLET ORAL DAILY PRN
COMMUNITY
Start: 2021-10-14 | End: 2022-11-01 | Stop reason: SDUPTHER

## 2021-11-09 NOTE — PATIENT INSTRUCTIONS
Patient to return to clinic 6 months (office will call and schedule an appt). AVS reviewed and mailed to pt. It is very important for your care that you keep your appointment. If for some reason you are unable to keep your appointment it is equally important that you call our office at [phone] to cancel your appointment and reschedule. Failure to do so may result in your termination from our practice.   MB

## 2021-11-10 ENCOUNTER — HOSPITAL ENCOUNTER (EMERGENCY)
Age: 31
Discharge: HOME | End: 2021-11-10
Attending: EMERGENCY MEDICINE
Payer: MEDICARE

## 2021-11-10 VITALS
BODY MASS INDEX: 18.78 KG/M2 | TEMPERATURE: 98.4 F | HEIGHT: 64 IN | WEIGHT: 110 LBS | DIASTOLIC BLOOD PRESSURE: 93 MMHG | OXYGEN SATURATION: 100 % | RESPIRATION RATE: 16 BRPM | HEART RATE: 113 BPM | SYSTOLIC BLOOD PRESSURE: 129 MMHG

## 2021-11-10 DIAGNOSIS — K29.01 ACUTE GASTRITIS WITH HEMORRHAGE, UNSPECIFIED GASTRITIS TYPE: Primary | ICD-10-CM

## 2021-11-10 LAB
ABSOLUTE EOS #: 0.15 K/UL (ref 0–0.44)
ABSOLUTE IMMATURE GRANULOCYTE: 0.06 K/UL (ref 0–0.3)
ABSOLUTE LYMPH #: 3.63 K/UL (ref 1.1–3.7)
ABSOLUTE MONO #: 0.71 K/UL (ref 0.1–1.2)
ALBUMIN SERPL-MCNC: 4.2 G/DL (ref 3.5–5.2)
ALBUMIN/GLOBULIN RATIO: NORMAL (ref 1–2.5)
ALP BLD-CCNC: 38 U/L (ref 35–104)
ALT SERPL-CCNC: 10 U/L (ref 5–33)
ANION GAP SERPL CALCULATED.3IONS-SCNC: 14 MMOL/L (ref 9–17)
AST SERPL-CCNC: 12 U/L
BASOPHILS # BLD: 1 % (ref 0–2)
BASOPHILS ABSOLUTE: 0.1 K/UL (ref 0–0.2)
BILIRUB SERPL-MCNC: 0.3 MG/DL (ref 0.3–1.2)
BILIRUBIN DIRECT: 0.1 MG/DL
BILIRUBIN, INDIRECT: 0.2 MG/DL (ref 0–1)
BUN BLDV-MCNC: 29 MG/DL (ref 6–20)
BUN/CREAT BLD: 45 (ref 9–20)
CALCIUM SERPL-MCNC: 9.3 MG/DL (ref 8.6–10.4)
CHLORIDE BLD-SCNC: 103 MMOL/L (ref 98–107)
CHP ED QC CHECK: YES
CO2: 20 MMOL/L (ref 20–31)
CREAT SERPL-MCNC: 0.64 MG/DL (ref 0.5–0.9)
DATE, STOOL #1: ABNORMAL
DATE, STOOL #2: ABNORMAL
DATE, STOOL #3: ABNORMAL
DIFFERENTIAL TYPE: ABNORMAL
EOSINOPHILS RELATIVE PERCENT: 1 % (ref 1–4)
GFR AFRICAN AMERICAN: >60 ML/MIN
GFR NON-AFRICAN AMERICAN: >60 ML/MIN
GFR SERPL CREATININE-BSD FRML MDRD: ABNORMAL ML/MIN/{1.73_M2}
GFR SERPL CREATININE-BSD FRML MDRD: ABNORMAL ML/MIN/{1.73_M2}
GLOBULIN: NORMAL G/DL (ref 1.5–3.8)
GLUCOSE BLD-MCNC: 92 MG/DL (ref 70–99)
HCT VFR BLD CALC: 35.6 % (ref 36.3–47.1)
HEMOCCULT SP1 STL QL: POSITIVE
HEMOCCULT SP2 STL QL: ABNORMAL
HEMOCCULT SP3 STL QL: ABNORMAL
HEMOGLOBIN: 11.5 G/DL (ref 11.9–15.1)
IMMATURE GRANULOCYTES: 1 %
LIPASE: 24 U/L (ref 13–60)
LYMPHOCYTES # BLD: 35 % (ref 24–43)
MCH RBC QN AUTO: 30.3 PG (ref 25.2–33.5)
MCHC RBC AUTO-ENTMCNC: 32.3 G/DL (ref 28.4–34.8)
MCV RBC AUTO: 93.9 FL (ref 82.6–102.9)
MONOCYTES # BLD: 7 % (ref 3–12)
NRBC AUTOMATED: 0 PER 100 WBC
PDW BLD-RTO: 12.1 % (ref 11.8–14.4)
PLATELET # BLD: 279 K/UL (ref 138–453)
PLATELET ESTIMATE: ABNORMAL
PMV BLD AUTO: 10 FL (ref 8.1–13.5)
POTASSIUM SERPL-SCNC: 4.4 MMOL/L (ref 3.7–5.3)
PREGNANCY TEST URINE, POC: NEGATIVE
RBC # BLD: 3.79 M/UL (ref 3.95–5.11)
RBC # BLD: ABNORMAL 10*6/UL
SEG NEUTROPHILS: 55 % (ref 36–65)
SEGMENTED NEUTROPHILS ABSOLUTE COUNT: 5.71 K/UL (ref 1.5–8.1)
SODIUM BLD-SCNC: 137 MMOL/L (ref 135–144)
TIME, STOOL #1: 1612
TIME, STOOL #2: ABNORMAL
TIME, STOOL #3: ABNORMAL
TOTAL PROTEIN: 6.6 G/DL (ref 6.4–8.3)
WBC # BLD: 10.4 K/UL (ref 3.5–11.3)
WBC # BLD: ABNORMAL 10*3/UL

## 2021-11-10 PROCEDURE — 80048 BASIC METABOLIC PNL TOTAL CA: CPT

## 2021-11-10 PROCEDURE — 82270 OCCULT BLOOD FECES: CPT

## 2021-11-10 PROCEDURE — 99283 EMERGENCY DEPT VISIT LOW MDM: CPT

## 2021-11-10 PROCEDURE — 85025 COMPLETE CBC W/AUTO DIFF WBC: CPT

## 2021-11-10 PROCEDURE — 6370000000 HC RX 637 (ALT 250 FOR IP): Performed by: EMERGENCY MEDICINE

## 2021-11-10 PROCEDURE — 80076 HEPATIC FUNCTION PANEL: CPT

## 2021-11-10 PROCEDURE — 83690 ASSAY OF LIPASE: CPT

## 2021-11-10 PROCEDURE — 81025 URINE PREGNANCY TEST: CPT

## 2021-11-10 RX ORDER — FAMOTIDINE 20 MG/1
20 TABLET, FILM COATED ORAL ONCE
Status: COMPLETED | OUTPATIENT
Start: 2021-11-10 | End: 2021-11-10

## 2021-11-10 RX ORDER — CALCIUM CARBONATE 200(500)MG
1 TABLET,CHEWABLE ORAL DAILY
Qty: 30 TABLET | Refills: 0 | Status: SHIPPED | OUTPATIENT
Start: 2021-11-10 | End: 2021-12-10

## 2021-11-10 RX ORDER — FAMOTIDINE 20 MG/1
20 TABLET, FILM COATED ORAL 2 TIMES DAILY
Qty: 60 TABLET | Refills: 0 | Status: SHIPPED | OUTPATIENT
Start: 2021-11-10 | End: 2022-11-01

## 2021-11-10 RX ADMIN — LIDOCAINE HYDROCHLORIDE: 20 SOLUTION ORAL; TOPICAL at 15:31

## 2021-11-10 RX ADMIN — FAMOTIDINE 20 MG: 20 TABLET ORAL at 15:30

## 2021-11-10 ASSESSMENT — PAIN DESCRIPTION - ORIENTATION: ORIENTATION: UPPER

## 2021-11-10 ASSESSMENT — PAIN DESCRIPTION - DESCRIPTORS: DESCRIPTORS: SQUEEZING

## 2021-11-10 ASSESSMENT — PAIN DESCRIPTION - LOCATION: LOCATION: ABDOMEN

## 2021-11-10 ASSESSMENT — PAIN DESCRIPTION - PAIN TYPE: TYPE: ACUTE PAIN

## 2021-11-10 NOTE — ED TRIAGE NOTES
Pt reports onset of abd pain approx 2 weeks ago, but states she thought maybe her daily alcohol consumption was the cause so she cut back. States she had been drinking daily for approx 1 month which is not normal for her. States she has been under a lot of stress and is going through a divorce. Pt states her abd pain has not improved over the last two weeks. Reports one episode of dark vomit on 11/2/21 and a dark bowel movement this morning. Also reports fatigue all day today.

## 2021-11-11 LAB — HCG, PREGNANCY URINE (POC): NEGATIVE

## 2021-11-13 ASSESSMENT — ENCOUNTER SYMPTOMS
VOMITING: 1
COLOR CHANGE: 0
BLOOD IN STOOL: 1
SINUS PAIN: 0
SHORTNESS OF BREATH: 0
ABDOMINAL DISTENTION: 0
NAUSEA: 1
APNEA: 0
EYES NEGATIVE: 1
CONSTIPATION: 0
DIARRHEA: 0
CHEST TIGHTNESS: 0

## 2021-11-13 NOTE — ED PROVIDER NOTES
EMERGENCY DEPARTMENT ENCOUNTER    Pt Name: Phil White  MRN: [id number]  [place] 1990  Date of evaluation: 11/13/21  CHIEF COMPLAINT       Chief Complaint   Patient presents with    Abdominal Pain     HISTORY OF PRESENT ILLNESS   35-year-old female presents emergency room for epigastric abdominal discomfort that has been going on for about 2 weeks. It has been associated with some nausea and occasional vomiting. Patient states that today she did throw up and felt like there might have been a bit of blood in her vomit. She also has noticed dark stool. Patient has no underlying health problems. She is not on any blood thinners. Patient reports she has been drinking more than usual lately with multiple drinks on a daily basis. No fevers. No urinary complaints. REVIEW OF SYSTEMS     Review of Systems   Constitutional: Negative for activity change, chills and diaphoresis. HENT: Negative for congestion, sinus pain and tinnitus. Eyes: Negative. Respiratory: Negative for apnea, chest tightness and shortness of breath. Gastrointestinal: Positive for blood in stool, nausea and vomiting. Negative for abdominal distention, constipation and diarrhea. Genitourinary: Negative for difficulty urinating and frequency. Musculoskeletal: Negative for arthralgias and myalgias. Skin: Negative for color change and rash. Neurological: Negative for dizziness. Hematological: Negative. Psychiatric/Behavioral: Negative.         PASTMEDICAL HISTORY     Past Medical History:   Diagnosis Date    Asthma     H/O chlamydia infection     10/2014 - treated, 4/2015 LARISSA neg    Marijuana smoker 3/28/2017    Postpartum depression     no meds     Past Problem List  Patient Active Problem List   Diagnosis Code    Asthma J45.909    Tobacco use Z72.0    Marijuana smoker F12.90    Vaccine counseling/Gardasil Schedule Z71.85    Recurrent depression ([facility] [address] F33.9     SURGICAL HISTORY       Past Surgical History: Procedure Laterality Date    OSTEOTOMY Bilateral 12/10/2015    1) Decompression metatarsal osteotomy right 2nd. 2) Decompression metatarsal osteotomy left 2nd. CURRENT MEDICATIONS       Discharge Medication List as of 11/10/2021  4:33 PM      CONTINUE these medications which have NOT CHANGED    Details   citalopram (CELEXA) 20 MG tablet TAKE 1 TABLET BY MOUTH EVERY DAY, Disp-30 tablet, R-0Normal      cyclobenzaprine (FLEXERIL) 5 MG tablet Take 5 mg by mouth daily as neededHistorical Med      medroxyPROGESTERone (DEPO-PROVERA) 150 MG/ML injection Inject 1 mL into the muscle every 3 months, Disp-1 mL, R-3Normal      Prenatal Vit-Fe Fumarate-FA (CLASSIC PRENATAL) 28-0.8 MG TABS Take 1 tablet by mouth daily, Disp-30 tablet, R-5Normal      albuterol sulfate HFA (VENTOLIN HFA) 108 (90 Base) MCG/ACT inhaler inhale 2 puffs by mouth every 4 hours if needed, Disp-18 g, R-11Normal      mometasone-formoterol (DULERA) 100-5 MCG/ACT inhaler inhale 2 puffs by mouth every 12 hours, Disp-13 g, R-5Normal      benzoyl peroxide-erythromycin (BENZAMYCIN) 5-3 % gel apply to affected area twice a day, Disp-23.3 g, R-5, Normal           ALLERGIES     has No Known Allergies. FAMILY HISTORY     She indicated that her mother is alive. She indicated that her father is alive. She indicated that her sister is alive. She indicated that her brother is alive. She indicated that her maternal grandmother is . She indicated that her maternal grandfather is alive. She indicated that her paternal grandmother is alive. She indicated that her paternal grandfather is .      SOCIAL HISTORY       Social History     Tobacco Use    Smoking status: Current Some Day Smoker     Years: 13.00     Types: Cigarettes    Smokeless tobacco: Never Used    Tobacco comment: 2-3 cigs per day   Vaping Use    Vaping Use: Not on file   Substance Use Topics    Alcohol use: Yes     Comment: daily x past month    Drug use: No     Types: Marijuana (Weed)     PHYSICAL EXAM     INITIAL VITALS: BP (!) 129/93   Pulse 113   Temp 98.4 °F (36.9 °C) (Oral)   Resp 16   Ht 5' 4\" (1.626 m)   Wt 110 lb (49.9 kg)   SpO2 100%   BMI 18.88 kg/m²    Physical Exam  Constitutional:       General: She is not in acute distress. Appearance: She is well-developed. HENT:      Head: Normocephalic. Eyes:      Pupils: Pupils are equal, round, and reactive to light. Cardiovascular:      Rate and Rhythm: Normal rate and regular rhythm. Heart sounds: Normal heart sounds. Pulmonary:      Effort: Pulmonary effort is normal. No respiratory distress. Breath sounds: Normal breath sounds. Abdominal:      General: Bowel sounds are normal.      Palpations: Abdomen is soft. Tenderness: There is abdominal tenderness in the epigastric area. Genitourinary:     Comments: Unremarkable rectal exam  Musculoskeletal:         General: Normal range of motion. Skin:     General: Skin is warm and dry. Neurological:      Mental Status: She is alert and oriented to person, place, and time. MEDICAL DECISION MAKIN-year-old female presenting to the emergency room for epigastric abdominal discomfort dark stool and nausea. Patient is hemodynamically stable here in the emergency department. She is in no significant distress. She does have some epigastric tenderness on exam however has a soft benign abdomen. Stool occult is positive for blood indicating possible gastritis or ulcer. I discussed with the patient the need to follow-up with GI specialist diet changes and reduce alcohol consumption. All questions were answered here from the ED. Patient is agreeable to discharge plan.     CRITICAL CARE:       PROCEDURES:    Procedures    DIAGNOSTIC RESULTS   EKG:All EKG's are interpreted by the Emergency Department Physician who either signs or Co-signs this chart in the absence of a cardiologist.        RADIOLOGY:All plain film, CT, MRI, and formal ultrasound images (except ED bedside ultrasound) are read by the radiologist, see reports below, unless otherwisenoted in MDM or here. No orders to display     LABS: All lab results were reviewed by myself, and all abnormals are listed below. Labs Reviewed   CBC WITH AUTO DIFFERENTIAL - Abnormal; Notable for the following components:       Result Value    RBC 3.79 (*)     Hemoglobin 11.5 (*)     Hematocrit 35.6 (*)     Immature Granulocytes 1 (*)     All other components within normal limits   BASIC METABOLIC PANEL W/ REFLEX TO MG FOR LOW K - Abnormal; Notable for the following components:    BUN 29 (*)     Bun/Cre Ratio 45 (*)     All other components within normal limits   OCCULT BLOOD SCREEN - Abnormal; Notable for the following components:    Occult Blood, Stool #1 POSITIVE (*)     All other components within normal limits   POCT URINE PREGNANCY - Normal   HEPATIC FUNCTION PANEL   LIPASE   POCT URINE PREGNANCY       EMERGENCY DEPARTMENTCOURSE:         Vitals:    Vitals:    11/10/21 1451   BP: (!) 129/93   Pulse: 113   Resp: 16   Temp: 98.4 °F (36.9 °C)   TempSrc: Oral   SpO2: 100%   Weight: 110 lb (49.9 kg)   Height: 5' 4\" (1.626 m)       The patient was given the following medications while in the emergency department:  Orders Placed This Encounter   Medications    famotidine (PEPCID) tablet 20 mg    aluminum & magnesium hydroxide-simethicone (MAALOX) 30 mL, lidocaine viscous hcl (XYLOCAINE) 5 mL (GI COCKTAIL)    calcium carbonate (ANTACID) 500 MG chewable tablet     Sig: Take 1 tablet by mouth daily     Dispense:  30 tablet     Refill:  0    famotidine (PEPCID) 20 MG tablet     Sig: Take 1 tablet by mouth 2 times daily     Dispense:  60 tablet     Refill:  0     CONSULTS:  None    FINAL IMPRESSION      1.  Acute gastritis with hemorrhage, unspecified gastritis type          DISPOSITION/PLAN   DISPOSITION Decision To Discharge 11/10/2021 04:30:43 PM      PATIENT REFERRED TO:  Monse Calderón MD  [address]  [place] [address] [facility]  [phone]    Schedule an appointment as soon as possible for a visit in 1 week      Lon Colvin MD  [address] [place] [address] [address] [facility]  [phone]    Schedule an appointment as soon as possible for a visit in 1 week      DISCHARGE MEDICATIONS:  Discharge Medication List as of 11/10/2021  4:33 PM      START taking these medications    Details   calcium carbonate (ANTACID) 500 MG chewable tablet Take 1 tablet by mouth daily, Disp-30 tablet, R-0Normal      famotidine (PEPCID) 20 MG tablet Take 1 tablet by mouth 2 times daily, Disp-60 tablet, R-0Normal           Patricia Malik MD  Attending Emergency Physician      Care during this encounter was due to an unprecedented national emergency due to COVID-19.             Soraya Elizondo MD  11/13/21 7254

## 2021-11-24 ENCOUNTER — TELEPHONE (OUTPATIENT)
Dept: OBGYN | Age: 31
End: 2021-11-24

## 2021-11-24 NOTE — TELEPHONE ENCOUNTER
I was unable to leave a voice message with patient to reschedule her appt from 12/1/21 to 12/8/21 for [place]. Letter mailed to patient.

## 2021-12-02 DIAGNOSIS — F41.9 ANXIETY: ICD-10-CM

## 2021-12-02 RX ORDER — CITALOPRAM 20 MG/1
TABLET ORAL
Qty: 30 TABLET | Refills: 0 | Status: SHIPPED | OUTPATIENT
Start: 2021-12-02 | End: 2022-11-01

## 2021-12-08 ENCOUNTER — TELEPHONE (OUTPATIENT)
Dept: OBGYN | Age: 31
End: 2021-12-08

## 2022-02-03 ENCOUNTER — OFFICE VISIT (OUTPATIENT)
Dept: OBGYN | Age: 32
End: 2022-02-03
Payer: MEDICARE

## 2022-02-03 VITALS
DIASTOLIC BLOOD PRESSURE: 75 MMHG | SYSTOLIC BLOOD PRESSURE: 123 MMHG | HEIGHT: 64 IN | HEART RATE: 110 BPM | BODY MASS INDEX: 18.44 KG/M2 | WEIGHT: 108 LBS

## 2022-02-03 DIAGNOSIS — N94.6 DYSMENORRHEA: Primary | ICD-10-CM

## 2022-02-03 LAB
CONTROL: YES
PREGNANCY TEST URINE, POC: NEGATIVE

## 2022-02-03 PROCEDURE — 96372 THER/PROPH/DIAG INJ SC/IM: CPT

## 2022-02-03 PROCEDURE — G8420 CALC BMI NORM PARAMETERS: HCPCS | Performed by: OBSTETRICS & GYNECOLOGY

## 2022-02-03 PROCEDURE — 99213 OFFICE O/P EST LOW 20 MIN: CPT | Performed by: OBSTETRICS & GYNECOLOGY

## 2022-02-03 PROCEDURE — 4004F PT TOBACCO SCREEN RCVD TLK: CPT | Performed by: OBSTETRICS & GYNECOLOGY

## 2022-02-03 PROCEDURE — 99211 OFF/OP EST MAY X REQ PHY/QHP: CPT

## 2022-02-03 PROCEDURE — G8484 FLU IMMUNIZE NO ADMIN: HCPCS | Performed by: OBSTETRICS & GYNECOLOGY

## 2022-02-03 PROCEDURE — G8427 DOCREV CUR MEDS BY ELIG CLIN: HCPCS | Performed by: OBSTETRICS & GYNECOLOGY

## 2022-02-03 PROCEDURE — 81025 URINE PREGNANCY TEST: CPT | Performed by: OBSTETRICS & GYNECOLOGY

## 2022-02-03 PROCEDURE — 99213 OFFICE O/P EST LOW 20 MIN: CPT

## 2022-02-03 RX ORDER — MEDROXYPROGESTERONE ACETATE 150 MG/ML
150 INJECTION, SUSPENSION INTRAMUSCULAR
Qty: 1 ML | Refills: 3 | Status: SHIPPED | OUTPATIENT
Start: 2022-02-03

## 2022-02-03 RX ORDER — MEDROXYPROGESTERONE ACETATE 150 MG/ML
150 INJECTION, SUSPENSION INTRAMUSCULAR ONCE
Status: COMPLETED | OUTPATIENT
Start: 2022-02-03 | End: 2022-02-03

## 2022-02-03 RX ADMIN — MEDROXYPROGESTERONE ACETATE 150 MG: 150 INJECTION, SUSPENSION, EXTENDED RELEASE INTRAMUSCULAR at 10:54

## 2022-02-03 ASSESSMENT — PATIENT HEALTH QUESTIONNAIRE - PHQ9
SUM OF ALL RESPONSES TO PHQ9 QUESTIONS 1 & 2: 0
SUM OF ALL RESPONSES TO PHQ QUESTIONS 1-9: 0
SUM OF ALL RESPONSES TO PHQ QUESTIONS 1-9: 0
2. FEELING DOWN, DEPRESSED OR HOPELESS: 0
SUM OF ALL RESPONSES TO PHQ QUESTIONS 1-9: 0
SUM OF ALL RESPONSES TO PHQ QUESTIONS 1-9: 0
1. LITTLE INTEREST OR PLEASURE IN DOING THINGS: 0

## 2022-02-03 NOTE — PROGRESS NOTES
Chicho Walsh Tropic  2/3/2022    YOB: 1990          The patient was seen today. She is here regarding restarting depo . Her bowels are regular and she is voiding without difficulty. HPI:  Purvi Maxwell is a 32 y.o. female      Last depo injection was 21. Patient is very satisfied with the depo. We did discuss longer term options (iud, implant) and she is weighing her options. Will stay with depo for now. It does prevent her from having periods and helps with cramps. Pt's last intercourse was a week ago, it was protected. Patient's UPT today was negative. She can restart depo provera today. Encouraged barrier recommendations. Educated pt on completing Gardasil - pt declined finishing the series. OB History    Para Term  AB Living   2 2 2 0 0 2   SAB IAB Ectopic Molar Multiple Live Births   0 0 0 0 0 2      # Outcome Date GA Lbr Damien/2nd Weight Sex Delivery Anes PTL Lv   2 Term 17 37w0d  5 lb 8.5 oz (2.509 kg) M Vag-Spont   JAMES      Birth Comments: APG 8/9   1 Term 01/09/10 40w0d  6 lb 6 oz (2.892 kg) M Vag-Spont None  JAMES       Past Medical History:   Diagnosis Date    Asthma     H/O chlamydia infection     10/2014 - treated, 2015 LARISSA neg    Marijuana smoker 3/28/2017    Postpartum depression     no meds       Past Surgical History:   Procedure Laterality Date    OSTEOTOMY Bilateral 12/10/2015    1) Decompression metatarsal osteotomy right 2nd. 2) Decompression metatarsal osteotomy left 2nd.        Family History   Problem Relation Age of Onset    Other Mother         Chrons    Hypertension Father     No Known Problems Sister         3 sisters with asthma    Other Brother         2 brothers in good health    No Known Problems Maternal Grandmother     No Known Problems Maternal Grandfather     No Known Problems Paternal Grandmother     No Known Problems Paternal Grandfather        Social History     Socioeconomic History    Marital status: Single     Spouse name: Not on file    Number of children: Not on file    Years of education: Not on file    Highest education level: Not on file   Occupational History    Not on file   Tobacco Use    Smoking status: Current Some Day Smoker     Years: 13.00     Types: Cigarettes    Smokeless tobacco: Never Used    Tobacco comment: 2-3 cigs per day   Vaping Use    Vaping Use: Not on file   Substance and Sexual Activity    Alcohol use: Yes     Comment: daily x past month    Drug use: No     Types: Marijuana Westly Cliche)    Sexual activity: Yes     Partners: Male   Other Topics Concern    Not on file   Social History Narrative    Currently lives with her son and his father. Feels safe at home. Does not work. Started college but did not finish. Social Determinants of Health     Financial Resource Strain: Low Risk     Difficulty of Paying Living Expenses: Not hard at all   Food Insecurity: No Food Insecurity    Worried About Running Out of Food in the Last Year: Never true    Nory of Food in the Last Year: Never true   Transportation Needs: No Transportation Needs    Lack of Transportation (Medical): No    Lack of Transportation (Non-Medical):  No   Physical Activity:     Days of Exercise per Week: Not on file    Minutes of Exercise per Session: Not on file   Stress:     Feeling of Stress : Not on file   Social Connections:     Frequency of Communication with Friends and Family: Not on file    Frequency of Social Gatherings with Friends and Family: Not on file    Attends Yazdanism Services: Not on file    Active Member of Clubs or Organizations: Not on file    Attends Club or Organization Meetings: Not on file    Marital Status: Not on file   Intimate Partner Violence:     Fear of Current or Ex-Partner: Not on file    Emotionally Abused: Not on file    Physically Abused: Not on file    Sexually Abused: Not on file   Housing Stability:     Unable to Pay for Housing in the Last Year: Not on file    Number of Places Lived in the Last Year: Not on file    Unstable Housing in the Last Year: Not on file         MEDICATIONS:  Current Outpatient Medications   Medication Sig Dispense Refill    medroxyPROGESTERone (DEPO-PROVERA) 150 MG/ML injection Inject 1 mL into the muscle every 3 months for 1 dose 1 mL 3    citalopram (CELEXA) 20 MG tablet TAKE 1 TABLET BY MOUTH EVERY DAY 30 tablet 0    albuterol sulfate HFA (VENTOLIN HFA) 108 (90 Base) MCG/ACT inhaler inhale 2 puffs by mouth every 4 hours if needed 18 g 11    benzoyl peroxide-erythromycin (BENZAMYCIN) 5-3 % gel apply to affected area twice a day 23.3 g 5    famotidine (PEPCID) 20 MG tablet Take 1 tablet by mouth 2 times daily (Patient not taking: Reported on 2/3/2022) 60 tablet 0    cyclobenzaprine (FLEXERIL) 5 MG tablet Take 5 mg by mouth daily as needed (Patient not taking: Reported on 2/3/2022)      medroxyPROGESTERone (DEPO-PROVERA) 150 MG/ML injection Inject 1 mL into the muscle every 3 months (Patient not taking: Reported on 2/3/2022) 1 mL 3    Prenatal Vit-Fe Fumarate-FA (CLASSIC PRENATAL) 28-0.8 MG TABS Take 1 tablet by mouth daily (Patient not taking: Reported on 11/9/2021) 30 tablet 5    mometasone-formoterol (DULERA) 100-5 MCG/ACT inhaler inhale 2 puffs by mouth every 12 hours (Patient not taking: Reported on 2/3/2022) 13 g 5    medroxyPROGESTERone (DEPO-PROVERA) 150 MG/ML injection Inject 1 mL into the muscle once for 1 dose 1 mL 3     No current facility-administered medications for this visit. ALLERGIES:  Allergies as of 02/03/2022    (No Known Allergies)         REVIEW OF SYSTEMS:       A minimum of an eleven point review of systems was completed. Review Of Systems (11 point):  Constitutional: No fever, chills or malaise;  No weight change or fatigue  Head and Eyes: No vision, Headache, Dizziness or trauma in last 12 months  ENT ROS: No hearing, Tinnitis, sinus or taste problems  Hematological and Lymphatic 3rd injection      Recurrent depression ([facility] [address] 09/08/2018    Marijuana smoker 03/28/2017     Pt counseled not recommended in pregnancy. Maternal/Fetal risks reviewed. Pt verbalizes understanding. 6/8/17- Patient states she's smoking occasionally. PLAN:  Pt's last intercourse was one week ago and was protected, she has not had a period because of the depo, and her UPT was negative today. Rx for depo sent to pharmacy and patient can pick it up and return for the MA to administer. Return 12 weeks, for next depo injection, and in September 2022 for annual exam.    Repeat Annual every 1 year  Cervical Cytology Evaluation begins at 24years old. If Negative Cytology, Follow-up screening per current guidelines. Counseled on preventative health maintenance follow-up. Orders Placed This Encounter   Procedures    POCT urine pregnancy           The patient, Emma Morales is a 32 y.o. female, was seen with a total time spent of 20 minutes for the visit on this date of service by the E/M provider. The time component had both face to face and non face to face time spent in determining the total time component. Counseling and education regarding her diagnosis listed below and her options regarding those diagnoses were also included in determining her time component. Diagnosis Orders   1. Dysmenorrhea  POCT urine pregnancy    medroxyPROGESTERone (DEPO-PROVERA) 150 MG/ML injection        The patient had her preventative health maintenance recommendations and follow-up reviewed with her at the completion of her visit.     Alva Wu DO

## 2022-02-03 NOTE — PROGRESS NOTES
After obtaining consent, and per orders of Dr. Dre Zavala, injection of depo provera given in Right upper quad. gluteus by [employer]. Patient instructed to remain in clinic for 20 minutes afterwards, and to report any adverse reaction to me immediately.

## 2022-04-12 ENCOUNTER — TELEPHONE (OUTPATIENT)
Dept: INTERNAL MEDICINE | Age: 32
End: 2022-04-12

## 2022-04-28 ENCOUNTER — TELEPHONE (OUTPATIENT)
Dept: OBGYN | Age: 32
End: 2022-04-28

## 2022-04-28 NOTE — TELEPHONE ENCOUNTER
Patient no showed for 4/28/22 appointment at [address] [address] office. Writer left a voice message for patient to call the office to reschedule appointment.

## 2022-07-15 ENCOUNTER — TELEPHONE (OUTPATIENT)
Dept: INTERNAL MEDICINE | Age: 32
End: 2022-07-15

## 2022-07-15 NOTE — TELEPHONE ENCOUNTER
27 day med [address] gas dx asthma last seen 11/21     L/vm for pt to call our office to schedule appt     Form on dr Akash linares for sig

## 2022-07-30 DIAGNOSIS — J45.40 MODERATE PERSISTENT ASTHMA WITHOUT COMPLICATION: ICD-10-CM

## 2022-08-01 RX ORDER — ALBUTEROL SULFATE 90 UG/1
AEROSOL, METERED RESPIRATORY (INHALATION)
Qty: 18 EACH | Refills: 6 | OUTPATIENT
Start: 2022-08-01

## 2022-08-29 ENCOUNTER — TELEPHONE (OUTPATIENT)
Dept: INTERNAL MEDICINE | Age: 32
End: 2022-08-29

## 2022-08-29 NOTE — LETTER
[address] [address]  Phone: [phone]  Fax: [phone]    Semaj Jaeger MD        August 29, 2022    [address] [address]      Dear Loreta Devine: The office has tried to contact you to schedule an appointment. The phone number we have on file is an incorrect number. Please call the office to schedule an appointment. If you have any questions or concerns, please don't hesitate to call.     Sincerely,        Semaj Jaeger MD

## 2022-08-29 NOTE — TELEPHONE ENCOUNTER
Letter mailed to patient to schedule an appt. Tried to call the patient to schedule an appt and the phone number is incorrect. 54 y/o w/ chronic abdominal and shoulder discomfort. Will do EKG, labs, and reassess.

## 2022-09-15 ENCOUNTER — TELEPHONE (OUTPATIENT)
Dept: OBGYN | Age: 32
End: 2022-09-15

## 2022-09-15 NOTE — TELEPHONE ENCOUNTER
Pt called in stating she believes she may have bv and is wanting to know if something can be called into her pharmacy

## 2022-09-19 DIAGNOSIS — N89.8 VAGINAL DISCHARGE: Primary | ICD-10-CM

## 2022-09-19 RX ORDER — METRONIDAZOLE 500 MG/1
500 TABLET ORAL 2 TIMES DAILY
Qty: 14 TABLET | Refills: 0 | Status: SHIPPED | OUTPATIENT
Start: 2022-09-19 | End: 2022-09-26

## 2022-09-25 DIAGNOSIS — J45.40 MODERATE PERSISTENT ASTHMA WITHOUT COMPLICATION: ICD-10-CM

## 2022-09-26 RX ORDER — ALBUTEROL SULFATE 90 UG/1
AEROSOL, METERED RESPIRATORY (INHALATION)
Qty: 18 EACH | Refills: 6 | OUTPATIENT
Start: 2022-09-26

## 2022-11-01 ENCOUNTER — TELEMEDICINE (OUTPATIENT)
Dept: INTERNAL MEDICINE | Age: 32
End: 2022-11-01
Payer: MEDICARE

## 2022-11-01 DIAGNOSIS — J45.40 MODERATE PERSISTENT ASTHMA WITHOUT COMPLICATION: ICD-10-CM

## 2022-11-01 DIAGNOSIS — F41.9 ANXIETY: ICD-10-CM

## 2022-11-01 PROCEDURE — 99441 PR PHYS/QHP TELEPHONE EVALUATION 5-10 MIN: CPT | Performed by: INTERNAL MEDICINE

## 2022-11-01 RX ORDER — ALBUTEROL SULFATE 90 UG/1
AEROSOL, METERED RESPIRATORY (INHALATION)
Qty: 18 G | Refills: 11 | Status: SHIPPED | OUTPATIENT
Start: 2022-11-01

## 2022-11-01 RX ORDER — FAMOTIDINE 20 MG/1
20 TABLET, FILM COATED ORAL 2 TIMES DAILY
Qty: 60 TABLET | Refills: 0 | Status: CANCELLED | OUTPATIENT
Start: 2022-11-01

## 2022-11-01 RX ORDER — CYCLOBENZAPRINE HCL 5 MG
5 TABLET ORAL DAILY PRN
Qty: 20 TABLET | Refills: 1 | Status: SHIPPED | OUTPATIENT
Start: 2022-11-01

## 2022-11-01 RX ORDER — CITALOPRAM 20 MG/1
TABLET ORAL
Qty: 30 TABLET | Refills: 0 | Status: CANCELLED | OUTPATIENT
Start: 2022-11-01

## 2022-11-01 SDOH — ECONOMIC STABILITY: FOOD INSECURITY: WITHIN THE PAST 12 MONTHS, YOU WORRIED THAT YOUR FOOD WOULD RUN OUT BEFORE YOU GOT MONEY TO BUY MORE.: SOMETIMES TRUE

## 2022-11-01 SDOH — ECONOMIC STABILITY: FOOD INSECURITY: WITHIN THE PAST 12 MONTHS, THE FOOD YOU BOUGHT JUST DIDN'T LAST AND YOU DIDN'T HAVE MONEY TO GET MORE.: SOMETIMES TRUE

## 2022-11-01 ASSESSMENT — SOCIAL DETERMINANTS OF HEALTH (SDOH): HOW HARD IS IT FOR YOU TO PAY FOR THE VERY BASICS LIKE FOOD, HOUSING, MEDICAL CARE, AND HEATING?: HARD

## 2022-11-01 NOTE — PROGRESS NOTES
[facility] PHONE [facility] [facility], [address] [facility], [address]  [phone] ext: [phone]  [facility]. Located within [facility]. [facility]   Epiphany of the [facility] Steven, Gas Service, [address]  [phone]  Service-Intake www.epiphanyofthelord. [address] [facility] [phone]  [address] [facility] [phone]  Service-Intake http://[email or web address]. oh./   Mustron [employer]ate [employer], Gas Service, [address]  [phone]  Service-Intake - and Fax DiscGenicsanishMoonfruit. [employer]. [employer] Network   National Multiple Sclerosis Society [employer], Electric, Gas and [address] [phone] ext: 1  Toll Free [facility].org/Chapters/OHA   Ovarian Cancer Connection Electric, Gas [facility], [address]  [phone]  Clara. [address] www. ovarianconnection. org   Pathway Heating Fuel, Electric, Gas and [address] [phone] ext: Lydia Villagran www. pathwaytoledo. [address] [place] [address] Heating Fuel, Electric, Gas and Thingvallastraeti 36 [phone]  Service-Intake www.peymanohevans. [address] [place], [place] [address]yll Road  [phone]  [facility] [employer]Realty   2900 [facility] Social Concerns Electric, Gas Service, [address]  [phone]  Jarocho Elise 148, [address]  [phone]  Administrative www.saint-pius. org     Updated 1/30/20

## 2022-11-01 NOTE — PROGRESS NOTES
FOOD RESOURCES      RESOURCE SERVICE PHONE [employer]   Outreach of Galen [address] 442 [phone]  Administrative www.iccatdarby. org   Mustard [employer]o [phone]  Clara. [address] 47 - and Fax N/A   Connor Harman of [facility] [phone] [address] [phone] 66 MeetMoi / Oasys Mobile  [phone]  TacuaPremier Healthbo 2366 Assistance Programs [phone]  Wilma Melendez. [address] Feed Your Neighbor [phone]  Shaina Rosario 178 [phone]  [address] [facility] [address] [phone] [address] [phone]  Service-Intake www.estelaationarmynwohio. 5353 [facility] Emergency Food Pantry [phone]  Service-Intake www.Beaumont Hospitalo. [facility] U. 91. ([phone]) [phone]  Service-Intake www.stpaulscommunitycenter. [address] [place] [address] [phone]  Administrative www. [facility] Healt[facility]omasPag[facility] Centersharee. [address] Bank Back Pack Program [phone]  Mona Carrillo. [facility]. 78 Meals Program [phone] 450 [facility] and [facility] for men www.[facility]. [facility] for Social and 351 [facility] [phone] www. providencmonicantertoledo. [address] [place] [address] [phone]  [facility] Bella Elise 187. com    Alevism Women [employer] Emergency Assistance [phone] [address] Feed Your Neighbor [phone] Administrative N/A   [facility] 65 22 [phone]  [address] / Argenis Norse [phone] 112 [facility] [phone] 300 [facility] Pantry / Meals [phone]  [address]  www. [facility]. [facility] 50 Pantry / Auto[employer] Insurance [phone]  Administrative www. doretha. [facility] [phone]  [address] of [address] [facility] / Tauna Kizzy [phone]  1840 Wealthy [facility] Pantry and Grooming Supplies [phone]  [address]  [email or web address]    [address] [phone] 1300 [facility] PASSAVANT-CRANBERRY-ER Positive Living Program [phone] ext: 15 9297 [facility] www. MoneyFarm. Acetylon Pharmaceuticals    Food for [address] 42-30-72-51 www. feedtoledo. Radhaupea 50 Pantry [phone]  [address] [facility] [address] Assembly of [phone] http://www. restoretheQuiklyion. eegoes    776 [facility] [phone]  227 [facility] Family Pantry and South [place] [phone] nightingales-harvest.org    201 [facility] [phone]  . [facility] www. [email or web address].QRU    [facility] [phone]  Administrative factoledo. [address] [place] [address],15Th Floor [phone]  Toll Free www. Acendi Interactive    Body of 3 [facility] for [address] 1 Pantry (872) 110 [facility] [phone] www. [facility]. Flexiant     Living 546 [facility] [phone] [address] Morning Blessings [phone]  Administrative N/A   [facility]  of 1333 [facility] Feed Your Neighbor [phone]  100 [address] [facility] for the Poor Food Pantry [phone]  855 [facility]. [facility]/   [facility] RobNorthern Navajo Medical Centeraditya 30 Pantry  [phone] Brittney Tenorio 13 Emergency Food and Hygiene Pantry [phone]  Administrative www. [facility]. [facility] [address] [phone]  2000 [facility] [phone]  [address] tv    Helping Hands of [address] / Aure Hernandez / Rylee Corado [phone]  [address] [place] [address]. [address] [facility] [address] Pantry [facility] [phone]  [place] [address] [phone]  [address] [place] [address]  [phone]  Marseille Networks.pt    125 [facility] Pantry [phone] http://[facility].org/   400 [facility] Pantry  [phone] N/A   Food for [address] Pantry [phone] N/A     Updated 1/30/20
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
## 2023-03-14 ENCOUNTER — TELEPHONE (OUTPATIENT)
Dept: INTERNAL MEDICINE | Age: 33
End: 2023-03-14

## 2023-04-18 ENCOUNTER — HOSPITAL ENCOUNTER (EMERGENCY)
Age: 33
Discharge: HOME | End: 2023-04-19
Attending: EMERGENCY MEDICINE
Payer: MEDICAID

## 2023-04-18 ENCOUNTER — APPOINTMENT (OUTPATIENT)
Dept: GENERAL RADIOLOGY | Age: 33
End: 2023-04-18
Payer: MEDICAID

## 2023-04-18 DIAGNOSIS — R55 SYNCOPE AND COLLAPSE: Primary | ICD-10-CM

## 2023-04-18 LAB
ABSOLUTE EOS #: 0.19 K/UL (ref 0–0.44)
ABSOLUTE IMMATURE GRANULOCYTE: 0.04 K/UL (ref 0–0.3)
ABSOLUTE LYMPH #: 3.75 K/UL (ref 1.1–3.7)
ABSOLUTE MONO #: 0.71 K/UL (ref 0.1–1.2)
BASOPHILS # BLD: 1 % (ref 0–2)
BASOPHILS ABSOLUTE: 0.11 K/UL (ref 0–0.2)
EOSINOPHILS RELATIVE PERCENT: 2 % (ref 1–4)
HCG QUALITATIVE: NEGATIVE
HCT VFR BLD AUTO: 32 % (ref 36.3–47.1)
HGB BLD-MCNC: 10.7 G/DL (ref 11.9–15.1)
IMMATURE GRANULOCYTES: 0 %
LYMPHOCYTES # BLD: 41 % (ref 24–43)
MCH RBC QN AUTO: 31.9 PG (ref 25.2–33.5)
MCHC RBC AUTO-ENTMCNC: 33.4 G/DL (ref 28.4–34.8)
MCV RBC AUTO: 95.5 FL (ref 82.6–102.9)
MONOCYTES # BLD: 8 % (ref 3–12)
NRBC AUTOMATED: 0 PER 100 WBC
PDW BLD-RTO: 13.5 % (ref 11.8–14.4)
PLATELET # BLD AUTO: 377 K/UL (ref 138–453)
PMV BLD AUTO: 9.8 FL (ref 8.1–13.5)
RBC # BLD: 3.35 M/UL (ref 3.95–5.11)
SEG NEUTROPHILS: 48 % (ref 36–65)
SEGMENTED NEUTROPHILS ABSOLUTE COUNT: 4.38 K/UL (ref 1.5–8.1)
TROPONIN I SERPL DL<=0.01 NG/ML-MCNC: <6 NG/L (ref 0–14)
WBC # BLD AUTO: 9.2 K/UL (ref 3.5–11.3)

## 2023-04-18 PROCEDURE — 85025 COMPLETE CBC W/AUTO DIFF WBC: CPT

## 2023-04-18 PROCEDURE — 93005 ELECTROCARDIOGRAM TRACING: CPT | Performed by: STUDENT IN AN ORGANIZED HEALTH CARE EDUCATION/TRAINING PROGRAM

## 2023-04-18 PROCEDURE — 80048 BASIC METABOLIC PNL TOTAL CA: CPT

## 2023-04-18 PROCEDURE — 84703 CHORIONIC GONADOTROPIN ASSAY: CPT

## 2023-04-18 PROCEDURE — 99285 EMERGENCY DEPT VISIT HI MDM: CPT

## 2023-04-18 PROCEDURE — 71045 X-RAY EXAM CHEST 1 VIEW: CPT

## 2023-04-18 PROCEDURE — 84484 ASSAY OF TROPONIN QUANT: CPT

## 2023-04-18 PROCEDURE — 83735 ASSAY OF MAGNESIUM: CPT

## 2023-04-18 PROCEDURE — 2580000003 HC RX 258: Performed by: STUDENT IN AN ORGANIZED HEALTH CARE EDUCATION/TRAINING PROGRAM

## 2023-04-18 RX ORDER — 0.9 % SODIUM CHLORIDE 0.9 %
1000 INTRAVENOUS SOLUTION INTRAVENOUS ONCE
Status: COMPLETED | OUTPATIENT
Start: 2023-04-18 | End: 2023-04-19

## 2023-04-18 RX ADMIN — SODIUM CHLORIDE 1000 ML: 9 INJECTION, SOLUTION INTRAVENOUS at 23:30

## 2023-04-18 ASSESSMENT — PAIN - FUNCTIONAL ASSESSMENT: PAIN_FUNCTIONAL_ASSESSMENT: 0-10

## 2023-04-18 ASSESSMENT — PAIN SCALES - GENERAL: PAINLEVEL_OUTOF10: 7

## 2023-04-19 VITALS
DIASTOLIC BLOOD PRESSURE: 68 MMHG | SYSTOLIC BLOOD PRESSURE: 111 MMHG | RESPIRATION RATE: 22 BRPM | OXYGEN SATURATION: 95 % | HEART RATE: 105 BPM | WEIGHT: 110 LBS | TEMPERATURE: 98 F | BODY MASS INDEX: 18.88 KG/M2

## 2023-04-19 LAB
ANION GAP SERPL CALCULATED.3IONS-SCNC: 15 MMOL/L (ref 9–17)
BUN SERPL-MCNC: 5 MG/DL (ref 6–20)
CALCIUM SERPL-MCNC: 9.1 MG/DL (ref 8.6–10.4)
CHLORIDE SERPL-SCNC: 105 MMOL/L (ref 98–107)
CO2 SERPL-SCNC: 18 MMOL/L (ref 20–31)
CREAT SERPL-MCNC: 0.8 MG/DL (ref 0.5–0.9)
EKG ATRIAL RATE: 95 BPM
EKG P AXIS: 75 DEGREES
EKG P-R INTERVAL: 112 MS
EKG Q-T INTERVAL: 366 MS
EKG QRS DURATION: 70 MS
EKG QTC CALCULATION (BAZETT): 459 MS
EKG R AXIS: 75 DEGREES
EKG T AXIS: 66 DEGREES
EKG VENTRICULAR RATE: 95 BPM
GFR SERPL CREATININE-BSD FRML MDRD: >60 ML/MIN/1.73M2
GLUCOSE SERPL-MCNC: 115 MG/DL (ref 70–99)
MAGNESIUM SERPL-MCNC: 1.8 MG/DL (ref 1.6–2.6)
POTASSIUM SERPL-SCNC: 3 MMOL/L (ref 3.7–5.3)
SODIUM SERPL-SCNC: 138 MMOL/L (ref 135–144)
TROPONIN I SERPL DL<=0.01 NG/ML-MCNC: <6 NG/L (ref 0–14)

## 2023-04-19 PROCEDURE — 84484 ASSAY OF TROPONIN QUANT: CPT

## 2023-04-19 ASSESSMENT — ENCOUNTER SYMPTOMS
NAUSEA: 0
VOMITING: 0
CHEST TIGHTNESS: 0
SHORTNESS OF BREATH: 0
ABDOMINAL PAIN: 0

## 2023-04-19 NOTE — ED PROVIDER NOTES
[facility]     Emergency Department     Faculty Note/ Attestation      Pt Name: Estephania Russell                                       MRN: [id number]  [place] 1990  Date of evaluation: 4/18/2023    Patients PCP:    Alejandra Navas MD    Attestation  I performed a history and physical examination of the patient and discussed management with the resident. I reviewed the residents note and agree with the documented findings and plan of care. Any areas of disagreement are noted on the chart. I was personally present for the key portions of any procedures. I have documented in the chart those procedures where I was not present during the key portions. I have reviewed the emergency nurses triage note. I agree with the chief complaint, past medical history, past surgical history, allergies, medications, social and family history as documented unless otherwise noted below. For Physician Assistant/ Nurse Practitioner cases/documentation I have personally evaluated this patient and have completed at least one if not all key elements of the E/M (history, physical exam, and MDM). Additional findings are as noted. Initial Screens:        Devers Coma Scale  Eye Opening: Spontaneous  Best Verbal Response: Oriented  Best Motor Response: Obeys commands  Fernando Coma Scale Score: 15    Vitals:    Vitals:    04/18/23 2211 04/18/23 2215   BP: 107/83 107/83   Pulse: 86 82   Resp: 25 14   Temp: 98 °F (36.7 °C)    SpO2:  99%   Weight: 110 lb (49.9 kg)        CHIEF COMPLAINT       Chief Complaint   Patient presents with    Dizziness       The pt is a 27 YO F who felt dizzy and light headed like passing out. NO hx of cardiac disease no family hx no recent illnesses or infections.           EMERGENCY DEPARTMENT COURSE:     -------------------------  BP: 107/83, Temp: 98 °F (36.7 °C), Heart Rate: 82, Resp: 14      Comments  Medical Decision Making  Amount and/or Complexity of Data Reviewed  Labs:

## 2023-04-19 NOTE — ED PROVIDER NOTES
Faculty Sign-Out Attestation  Handoff taken on the following patient from prior Attending Physician: Emiele Doll    I was available and discussed any additional care issues that arose and coordinated the management plans with the resident(s) caring for the patient during my duty period. Any areas of disagreement with residents documentation of care or procedures are noted on the chart. I was personally present for the key portions of any/all procedures during my duty period. I have documented in the chart those procedures where I was not present during the key portions.     Vasovagal syncope, basic lab / trop, & fluids,   Probable discharge    -trop <6, vss,   S/s improved, >> discharged per plan     Mi Rothman, DO  04/19/23 0118
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
## 2023-04-19 NOTE — DISCHARGE INSTRUCTIONS
You were seen in the emergency department after passing out. Your symptoms are concerning for what we call vasovagal syncope which is a reaction to stress. We obtained lab work, EKG and chest x-ray which showed no abnormalities that require treatment here in the emergency department. Although nothing was found here, this does not mean that you do not have any type of underlying issue that we are unable to find. There is important that you follow-up with your primary care provider for ongoing management. Return to emergency department if your symptoms were to return, you develop chest pain, palpitations, become pale or sweaty, develop nausea or vomiting or abdominal pain, dizziness, or if you develop weakness to one side your body.

## 2023-04-19 NOTE — PROGRESS NOTES
SPIRITUAL CARE DEPARTMENT - [address]  PROGRESS NOTE    Shift date: 4.18.2023  Shift day: Tuesday   Shift # 2    Room # 18/18   Name: Jorge A Kumar                Latter-day: unknown   Place of Buddhism: unknown    Referral: Routine Visit    Admit Date & Time: 4/18/2023 10:08 PM    Assessment:  Jorge A Kumar is a 28 y.o. female in the hospital. Family appears anxious and concerned. Asking to see the patient.  attempted to deescalate the anxiety. Once bedside, family appeared to be coping appropriately. Intervention:  Writer introduced self and title as  Writer offered space for the family  to express feelings, needs, and concerns and provided a ministry presence. Outcome:  Family appears to be coping. Plan:  Chaplains will remain available to offer spiritual and emotional support as needed.       Electronically signed by Cierra Redd on 4/19/2023 at 2:52 AM.  [facility]  [phone]       04/18/23 2210   Encounter Summary   Service Provided For: Family   Referral/Consult From: [facility] Family members   Last Encounter  04/18/23   Complexity of Encounter Moderate   Begin Time 2210   End Time  2220   Total Time Calculated 10 min   Encounter    Type Initial Screen/Assessment   Assessment/Intervention/Outcome   Assessment Anxious   Intervention Active listening;Explored/Affirmed feelings, thoughts, concerns   Outcome Coping       Electronically signed by Kallie Kelly on 4/19/2023 at 2:52 AM

## 2023-09-28 DIAGNOSIS — J45.40 MODERATE PERSISTENT ASTHMA WITHOUT COMPLICATION: ICD-10-CM

## 2023-09-28 NOTE — TELEPHONE ENCOUNTER
Pharmacy requesting refills for [facility]. Please review and e-scribe to pharmacy listed in chart if appropriate. Thank you. Next Visit Date: [employer]  Last Visit Date: 11/1/22    No future appointments.     Health Maintenance   Topic Date Due    COVID-19 Vaccine (1) Never done    Pneumococcal 0-64 years Vaccine (1 - PCV) Never done    HPV vaccine (2 - 3-dose series) 09/03/2008    Hepatitis A vaccine (2 of 2 - 2-dose series) 02/06/2009    Depression Monitoring  02/03/2023    Flu vaccine (1) 08/01/2023    Cervical cancer screen  09/08/2026    DTaP/Tdap/Td vaccine (7 - Td or Tdap) 07/26/2028    Hepatitis B vaccine  Completed    Hib vaccine  Completed    Hepatitis C screen  Completed    HIV screen  Completed    Meningococcal (ACWY) vaccine  Aged Out    Varicella vaccine  Discontinued       No results found for: \"LABA1C\"          ( goal A1C is < 7)   No components found for: \"LABMICR\"  LDL Cholesterol (mg/dL)   Date Value   10/15/2014 81       (goal LDL is <100)   AST (IU/L)   Date Value   02/07/2022 20     ALT (IU/L)   Date Value   02/07/2022 16     BUN (mg/dL)   Date Value   04/18/2023 5 (L)     BP Readings from Last 3 Encounters:   04/19/23 111/68   02/03/22 123/75   11/10/21 (!) 129/93          (goal 120/80)    All Future Testing planned in [facility]  Lab Frequency Next Occurrence         Patient Active Problem List:     Asthma     Tobacco use     Marijuana smoker     Vaccine counseling/Gardasil Schedule     Recurrent depression ([address])

## 2023-11-17 ENCOUNTER — TELEPHONE (OUTPATIENT)
Dept: INTERNAL MEDICINE | Age: 33
End: 2023-11-17

## 2023-11-26 ENCOUNTER — HOSPITAL ENCOUNTER (EMERGENCY)
Age: 33
Discharge: HOME | End: 2023-11-26
Attending: EMERGENCY MEDICINE
Payer: MEDICAID

## 2023-11-26 ENCOUNTER — APPOINTMENT (OUTPATIENT)
Dept: CT IMAGING | Age: 33
End: 2023-11-26
Payer: MEDICAID

## 2023-11-26 VITALS
DIASTOLIC BLOOD PRESSURE: 68 MMHG | BODY MASS INDEX: 18.25 KG/M2 | SYSTOLIC BLOOD PRESSURE: 100 MMHG | HEIGHT: 64 IN | RESPIRATION RATE: 16 BRPM | WEIGHT: 106.92 LBS | HEART RATE: 88 BPM | TEMPERATURE: 97.7 F | OXYGEN SATURATION: 99 %

## 2023-11-26 DIAGNOSIS — F12.90 CANNABINOID HYPEREMESIS SYNDROME: Primary | ICD-10-CM

## 2023-11-26 DIAGNOSIS — R11.2 CANNABINOID HYPEREMESIS SYNDROME: Primary | ICD-10-CM

## 2023-11-26 LAB
ALBUMIN SERPL-MCNC: 4.8 G/DL (ref 3.5–5.2)
ALBUMIN/GLOB SERPL: 1.4 {RATIO} (ref 1–2.5)
ALP SERPL-CCNC: 47 U/L (ref 35–104)
ALT SERPL-CCNC: 11 U/L (ref 5–33)
ANION GAP SERPL CALCULATED.3IONS-SCNC: 15 MMOL/L (ref 9–17)
AST SERPL-CCNC: 17 U/L
BASOPHILS # BLD: 0.1 K/UL (ref 0–0.2)
BASOPHILS NFR BLD: 1 % (ref 0–2)
BILIRUB SERPL-MCNC: 1.8 MG/DL (ref 0.3–1.2)
BILIRUB UR QL STRIP: NEGATIVE
BUN SERPL-MCNC: 12 MG/DL (ref 6–20)
CALCIUM SERPL-MCNC: 10 MG/DL (ref 8.6–10.4)
CHLORIDE SERPL-SCNC: 99 MMOL/L (ref 98–107)
CLARITY UR: CLEAR
CO2 SERPL-SCNC: 22 MMOL/L (ref 20–31)
COLOR UR: ABNORMAL
CREAT SERPL-MCNC: 0.8 MG/DL (ref 0.5–0.9)
EOSINOPHIL # BLD: <0.03 K/UL (ref 0–0.44)
EOSINOPHILS RELATIVE PERCENT: 0 % (ref 1–4)
EPI CELLS #/AREA URNS HPF: ABNORMAL /HPF (ref 0–5)
ERYTHROCYTE [DISTWIDTH] IN BLOOD BY AUTOMATED COUNT: 13.4 % (ref 11.8–14.4)
GFR SERPL CREATININE-BSD FRML MDRD: >60 ML/MIN/1.73M2
GLUCOSE SERPL-MCNC: 102 MG/DL (ref 70–99)
GLUCOSE UR STRIP-MCNC: NEGATIVE MG/DL
HCG SERPL QL: NEGATIVE
HCT VFR BLD AUTO: 45.3 % (ref 36.3–47.1)
HGB BLD-MCNC: 15.5 G/DL (ref 11.9–15.1)
HGB UR QL STRIP.AUTO: ABNORMAL
IMM GRANULOCYTES # BLD AUTO: 0.03 K/UL (ref 0–0.3)
IMM GRANULOCYTES NFR BLD: 0 %
KETONES UR STRIP-MCNC: ABNORMAL MG/DL
LEUKOCYTE ESTERASE UR QL STRIP: NEGATIVE
LIPASE SERPL-CCNC: 34 U/L (ref 13–60)
LYMPHOCYTES NFR BLD: 3.16 K/UL (ref 1.1–3.7)
LYMPHOCYTES RELATIVE PERCENT: 30 % (ref 24–43)
MCH RBC QN AUTO: 30.5 PG (ref 25.2–33.5)
MCHC RBC AUTO-ENTMCNC: 34.2 G/DL (ref 28.4–34.8)
MCV RBC AUTO: 89 FL (ref 82.6–102.9)
MONOCYTES NFR BLD: 1.36 K/UL (ref 0.1–1.2)
MONOCYTES NFR BLD: 13 % (ref 3–12)
NEUTROPHILS NFR BLD: 56 % (ref 36–65)
NEUTS SEG NFR BLD: 5.95 K/UL (ref 1.5–8.1)
NITRITE UR QL STRIP: NEGATIVE
NRBC BLD-RTO: 0 PER 100 WBC
PH UR STRIP: 5.5 [PH] (ref 5–8)
PLATELET # BLD AUTO: 274 K/UL (ref 138–453)
PMV BLD AUTO: 10.6 FL (ref 8.1–13.5)
POTASSIUM SERPL-SCNC: 4.4 MMOL/L (ref 3.7–5.3)
PROT SERPL-MCNC: 8.2 G/DL (ref 6.4–8.3)
PROT UR STRIP-MCNC: ABNORMAL MG/DL
RBC # BLD AUTO: 5.09 M/UL (ref 3.95–5.11)
RBC #/AREA URNS HPF: ABNORMAL /HPF (ref 0–2)
SODIUM SERPL-SCNC: 136 MMOL/L (ref 135–144)
SP GR UR STRIP: >1.06 (ref 1–1.03)
UROBILINOGEN UR STRIP-ACNC: NORMAL EU/DL (ref 0–1)
WBC #/AREA URNS HPF: ABNORMAL /HPF (ref 0–5)
WBC OTHER # BLD: 10.6 K/UL (ref 3.5–11.3)

## 2023-11-26 PROCEDURE — 96375 TX/PRO/DX INJ NEW DRUG ADDON: CPT

## 2023-11-26 PROCEDURE — 85025 COMPLETE CBC W/AUTO DIFF WBC: CPT

## 2023-11-26 PROCEDURE — 6360000002 HC RX W HCPCS: Performed by: STUDENT IN AN ORGANIZED HEALTH CARE EDUCATION/TRAINING PROGRAM

## 2023-11-26 PROCEDURE — 93005 ELECTROCARDIOGRAM TRACING: CPT | Performed by: STUDENT IN AN ORGANIZED HEALTH CARE EDUCATION/TRAINING PROGRAM

## 2023-11-26 PROCEDURE — C9113 INJ PANTOPRAZOLE SODIUM, VIA: HCPCS | Performed by: STUDENT IN AN ORGANIZED HEALTH CARE EDUCATION/TRAINING PROGRAM

## 2023-11-26 PROCEDURE — 83690 ASSAY OF LIPASE: CPT

## 2023-11-26 PROCEDURE — 74177 CT ABD & PELVIS W/CONTRAST: CPT

## 2023-11-26 PROCEDURE — 6360000004 HC RX CONTRAST MEDICATION: Performed by: STUDENT IN AN ORGANIZED HEALTH CARE EDUCATION/TRAINING PROGRAM

## 2023-11-26 PROCEDURE — 99285 EMERGENCY DEPT VISIT HI MDM: CPT

## 2023-11-26 PROCEDURE — 96374 THER/PROPH/DIAG INJ IV PUSH: CPT

## 2023-11-26 PROCEDURE — 84703 CHORIONIC GONADOTROPIN ASSAY: CPT

## 2023-11-26 PROCEDURE — 80053 COMPREHEN METABOLIC PANEL: CPT

## 2023-11-26 PROCEDURE — 81001 URINALYSIS AUTO W/SCOPE: CPT

## 2023-11-26 PROCEDURE — 96361 HYDRATE IV INFUSION ADD-ON: CPT

## 2023-11-26 PROCEDURE — A4216 STERILE WATER/SALINE, 10 ML: HCPCS | Performed by: STUDENT IN AN ORGANIZED HEALTH CARE EDUCATION/TRAINING PROGRAM

## 2023-11-26 PROCEDURE — 2580000003 HC RX 258: Performed by: STUDENT IN AN ORGANIZED HEALTH CARE EDUCATION/TRAINING PROGRAM

## 2023-11-26 RX ORDER — ONDANSETRON 2 MG/ML
4 INJECTION INTRAMUSCULAR; INTRAVENOUS ONCE
Status: COMPLETED | OUTPATIENT
Start: 2023-11-26 | End: 2023-11-26

## 2023-11-26 RX ORDER — 0.9 % SODIUM CHLORIDE 0.9 %
1000 INTRAVENOUS SOLUTION INTRAVENOUS ONCE
Status: COMPLETED | OUTPATIENT
Start: 2023-11-26 | End: 2023-11-26

## 2023-11-26 RX ORDER — MORPHINE SULFATE 4 MG/ML
4 INJECTION INTRAVENOUS ONCE
Status: COMPLETED | OUTPATIENT
Start: 2023-11-26 | End: 2023-11-26

## 2023-11-26 RX ORDER — DROPERIDOL 2.5 MG/ML
0.62 INJECTION, SOLUTION INTRAMUSCULAR; INTRAVENOUS ONCE
Status: COMPLETED | OUTPATIENT
Start: 2023-11-26 | End: 2023-11-26

## 2023-11-26 RX ADMIN — DROPERIDOL 0.62 MG: 2.5 INJECTION, SOLUTION INTRAMUSCULAR; INTRAVENOUS at 11:38

## 2023-11-26 RX ADMIN — ONDANSETRON 4 MG: 2 INJECTION INTRAMUSCULAR; INTRAVENOUS at 10:33

## 2023-11-26 RX ADMIN — IOPAMIDOL 75 ML: 755 INJECTION, SOLUTION INTRAVENOUS at 10:52

## 2023-11-26 RX ADMIN — MORPHINE SULFATE 4 MG: 4 INJECTION INTRAVENOUS at 10:34

## 2023-11-26 RX ADMIN — PANTOPRAZOLE SODIUM 40 MG: 40 INJECTION, POWDER, FOR SOLUTION INTRAVENOUS at 10:35

## 2023-11-26 RX ADMIN — SODIUM CHLORIDE 1000 ML: 9 INJECTION, SOLUTION INTRAVENOUS at 11:41

## 2023-11-26 ASSESSMENT — ENCOUNTER SYMPTOMS
ABDOMINAL PAIN: 0
SORE THROAT: 0
DIARRHEA: 0
CONSTIPATION: 1
STRIDOR: 0
NAUSEA: 1
COUGH: 0
SHORTNESS OF BREATH: 0
BACK PAIN: 0
WHEEZING: 0
VOMITING: 1
RHINORRHEA: 0
PHOTOPHOBIA: 0

## 2023-11-26 ASSESSMENT — PAIN DESCRIPTION - ORIENTATION: ORIENTATION: UPPER

## 2023-11-26 ASSESSMENT — PAIN SCALES - GENERAL
PAINLEVEL_OUTOF10: 10
PAINLEVEL_OUTOF10: 10
PAINLEVEL_OUTOF10: 0

## 2023-11-26 ASSESSMENT — PAIN DESCRIPTION - FREQUENCY: FREQUENCY: CONTINUOUS

## 2023-11-26 ASSESSMENT — LIFESTYLE VARIABLES
HOW OFTEN DO YOU HAVE A DRINK CONTAINING ALCOHOL: MONTHLY OR LESS
HOW MANY STANDARD DRINKS CONTAINING ALCOHOL DO YOU HAVE ON A TYPICAL DAY: 1 OR 2

## 2023-11-26 ASSESSMENT — PAIN DESCRIPTION - LOCATION: LOCATION: ABDOMEN

## 2023-11-26 ASSESSMENT — PAIN - FUNCTIONAL ASSESSMENT
PAIN_FUNCTIONAL_ASSESSMENT: NONE - DENIES PAIN
PAIN_FUNCTIONAL_ASSESSMENT: 0-10

## 2023-11-26 ASSESSMENT — PAIN DESCRIPTION - DESCRIPTORS: DESCRIPTORS: BURNING;SHARP

## 2023-11-26 ASSESSMENT — PAIN DESCRIPTION - PAIN TYPE: TYPE: ACUTE PAIN

## 2023-11-26 ASSESSMENT — PAIN DESCRIPTION - ONSET: ONSET: SUDDEN

## 2023-11-26 NOTE — DISCHARGE INSTRUCTIONS
Seen in the emergency department for abdominal pain with nausea and vomiting. This is associated with marijuana use. Your CT imaging was unremarkable. Please refrain from taking NSAIDs such as Motrin due to your previous intestinal ulcer. NSAIDs can cause these ulcers. Please do your best to stay hydrated. Tolerating oral intake at time of discharge. Please follow-up with your PCP within the next 1 to 2 days.   Please return to the emergency department if you begin experiencing worsened abdominal pain, vomiting, fevers, chills, blood in your vomit, or any other concerning symptoms

## 2023-11-26 NOTE — ED NOTES
The following labs were labeled with appropriate pt sticker and tubed to lab:     [x] Blue     [x] Lavender   [] on ice  [x] Green/yellow  [x] Green/black [] on ice  [] Tenna Mark  [] on ice  [] Yellow  [] Red  [] Pink  [] Type/ Screen  [] ABG  [] VBG    [] COVID-19 swab    [] Rapid  [] PCR  [] Flu swab  [] Peds Viral Panel     [] Urine Sample  [] Fecal Sample  [] Pelvic Cultures  [] Blood Cultures  [] X 2  [] STREP Cultures      Huber Siddiqi RN  11/26/23 1024

## 2023-11-26 NOTE — ED PROVIDER NOTES
[facility] ED  Emergency Department Encounter  Emergency Medicine Resident     Pt Claudio Sevilla  MRN: [id number]  [address] [facility] [place] 1990  Date of evaluation: 11/26/23  PCP:  Margo Sharma MD  Note Started: 10:18 AM EST      CHIEF COMPLAINT       Chief Complaint   Patient presents with    Abdominal Pain     Epigastric area    Emesis       HISTORY OF PRESENT ILLNESS  (Location/Symptom, Timing/Onset, Context/Setting, Quality, Duration, Modifying Factors, Severity.)      Kimberley Opitz is a 35 y.o. female with PMH including marijuana use and history of ulcer who presents to the emergency department with 2 days of excruciating epigastric abdominal pain associated with vomiting. Patient denies fever, chills, shakes, radiation of her abdominal pain through to her back, dysuria, urinary frequency, urinary hesitancy. She states that she last smoked marijuana 2 days ago. She has been treating this abdominal pain with Motrin. She also mentions some associated constipation. She is in obvious discomfort and distress in the fetal position during encounter. PAST MEDICAL / SURGICAL / SOCIAL / FAMILY HISTORY      has a past medical history of Asthma, H/O chlamydia infection, Marijuana smoker, and Postpartum depression. has a past surgical history that includes osteotomy (Bilateral, 12/10/2015).       Social History     Socioeconomic History    Marital status: Single     Spouse name: Not on file    Number of children: Not on file    Years of education: Not on file    Highest education level: Not on file   Occupational History    Not on file   Tobacco Use    Smoking status: Some Days     Years: 13     Types: Cigarettes    Smokeless tobacco: Never    Tobacco comments:     2-3 cigs per day   Vaping Use    Vaping Use: Not on file   Substance and Sexual Activity    Alcohol use: Yes     Comment: daily x past month    Drug use: No     Types: Marijuana I[place]ona Mews)    Sexual activity: Yes     Partners: Male

## 2023-11-26 NOTE — ED NOTES
Pt came in to ER via triage with c/o of vomiting and severe epigastric pain PS of 10/10 started 2 days ago. Patient states that she has history of peptic ulcer a year ago. She also added that she has been throwing up severely. Pt denies chest pain, fever, SOB and diarrhea. Pt is alert and oriented x 4. Pt was seen and examined by resident and attending. Pt placed on continuous cardiac monitoring, BP, Pulse ox. EKG obtained. IV established and labs drawn. Call light within reach. Will continue plan of care.      Constantino Perez RN  11/26/23 9212

## 2023-11-26 NOTE — ED NOTES
The following labs were labeled with appropriate pt sticker and tubed to lab:     [] Blue     [] Lavender   [] on ice  [] Green/yellow  [] Green/black [] on ice  [] Giulia Hanna  [] on ice  [] Yellow  [] Red  [] Pink  [] Type/ Screen  [] ABG  [] VBG    [] COVID-19 swab    [] Rapid  [] PCR  [] Flu swab  [] Peds Viral Panel     [x] Urine Sample  [] Fecal Sample  [] Pelvic Cultures  [] Blood Cultures  [] X 2  [] STREP Cultures      Gracy Rob RN  11/26/23 2230

## 2023-11-28 LAB
EKG ATRIAL RATE: 64 BPM
EKG P AXIS: 74 DEGREES
EKG P-R INTERVAL: 100 MS
EKG Q-T INTERVAL: 442 MS
EKG QRS DURATION: 72 MS
EKG QTC CALCULATION (BAZETT): 455 MS
EKG R AXIS: 75 DEGREES
EKG T AXIS: 52 DEGREES
EKG VENTRICULAR RATE: 64 BPM

## 2025-03-14 NOTE — ED PROVIDER NOTES
[facility]     Emergency Department     Faculty Attestation    I performed a history and physical examination of the patient and discussed management with the resident. I reviewed the resident´s note and agree with the documented findings and plan of care. Any areas of disagreement are noted on the chart. I was personally present for the key portions of any procedures. I have documented in the chart those procedures where I was not present during the key portions. I have reviewed the emergency nurses triage note. I agree with the chief complaint, past medical history, past surgical history, allergies, medications, social and family history as documented unless otherwise noted below. For Physician Assistant/ Nurse Practitioner cases/documentation I have personally evaluated this patient and have completed at least one if not all key elements of the E/M (history, physical exam, and MDM). Additional findings are as noted. Vaginal discharge, abdomen soft and nontender, no guarding or rebounding, no pain at McBurney's point, no adnexal pain.      Kaye Kohli MD  10/30/21 [phone]